# Patient Record
Sex: FEMALE | Race: WHITE | NOT HISPANIC OR LATINO | Employment: OTHER | URBAN - NONMETROPOLITAN AREA
[De-identification: names, ages, dates, MRNs, and addresses within clinical notes are randomized per-mention and may not be internally consistent; named-entity substitution may affect disease eponyms.]

---

## 2017-01-10 ENCOUNTER — TRANSCRIBE ORDERS (OUTPATIENT)
Dept: ADMINISTRATIVE | Facility: HOSPITAL | Age: 72
End: 2017-01-10

## 2017-01-10 ENCOUNTER — LAB (OUTPATIENT)
Dept: LAB | Facility: HOSPITAL | Age: 72
End: 2017-01-10
Attending: INTERNAL MEDICINE

## 2017-01-10 DIAGNOSIS — N39.0 URINARY TRACT INFECTION, SITE NOT SPECIFIED: ICD-10-CM

## 2017-01-10 DIAGNOSIS — N39.0 URINARY TRACT INFECTION, SITE NOT SPECIFIED: Primary | ICD-10-CM

## 2017-01-10 LAB

## 2017-01-10 PROCEDURE — 81001 URINALYSIS AUTO W/SCOPE: CPT | Performed by: INTERNAL MEDICINE

## 2017-03-30 ENCOUNTER — HOSPITAL ENCOUNTER (OUTPATIENT)
Dept: MRI IMAGING | Age: 72
Discharge: HOME | End: 2017-03-30
Payer: MEDICARE

## 2017-03-30 DIAGNOSIS — Z96.652 PRESENCE OF LEFT ARTIFICIAL KNEE JOINT: ICD-10-CM

## 2017-03-30 DIAGNOSIS — M25.562 LEFT KNEE PAIN, UNSPECIFIED CHRONICITY: ICD-10-CM

## 2017-03-30 DIAGNOSIS — S83.412A SPRAIN OF MEDIAL COLLATERAL LIGAMENT OF LEFT KNEE, INITIAL ENCOUNTER: ICD-10-CM

## 2017-03-30 PROCEDURE — 73721 MRI JNT OF LWR EXTRE W/O DYE: CPT

## 2017-06-14 ENCOUNTER — TRANSCRIBE ORDERS (OUTPATIENT)
Dept: ADMINISTRATIVE | Facility: HOSPITAL | Age: 72
End: 2017-06-14

## 2017-06-14 DIAGNOSIS — Z12.31 ENCOUNTER FOR SCREENING MAMMOGRAM FOR MALIGNANT NEOPLASM OF BREAST: Primary | ICD-10-CM

## 2017-06-28 ENCOUNTER — HOSPITAL ENCOUNTER (OUTPATIENT)
Dept: MAMMOGRAPHY | Facility: HOSPITAL | Age: 72
Discharge: HOME | End: 2017-06-28
Attending: INTERNAL MEDICINE | Admitting: INTERNAL MEDICINE

## 2017-06-28 DIAGNOSIS — Z12.31 ENCOUNTER FOR SCREENING MAMMOGRAM FOR MALIGNANT NEOPLASM OF BREAST: ICD-10-CM

## 2017-06-28 PROCEDURE — G0202 SCR MAMMO BI INCL CAD: HCPCS

## 2017-06-28 PROCEDURE — 77063 BREAST TOMOSYNTHESIS BI: CPT

## 2017-06-30 ENCOUNTER — APPOINTMENT (OUTPATIENT)
Dept: MAMMOGRAPHY | Facility: HOSPITAL | Age: 72
End: 2017-06-30
Attending: INTERNAL MEDICINE

## 2017-10-20 ENCOUNTER — HOSPITAL ENCOUNTER (OUTPATIENT)
Age: 72
Setting detail: OBSERVATION
Discharge: HOME | End: 2017-10-22
Attending: EMERGENCY MEDICINE | Admitting: INTERNAL MEDICINE
Payer: MEDICARE

## 2017-10-20 ENCOUNTER — APPOINTMENT (OUTPATIENT)
Dept: CT IMAGING | Age: 72
End: 2017-10-20
Payer: MEDICARE

## 2017-10-20 DIAGNOSIS — K57.32 DIVERTICULITIS OF COLON: Primary | ICD-10-CM

## 2017-10-20 PROBLEM — I10 HTN (HYPERTENSION): Chronic | Status: ACTIVE | Noted: 2017-10-20

## 2017-10-20 LAB
ALBUMIN SERPL-MCNC: 4.4 G/DL (ref 3.5–5.2)
ALP BLD-CCNC: 88 U/L (ref 35–104)
ALT SERPL-CCNC: 11 U/L (ref 5–33)
ANION GAP SERPL CALCULATED.3IONS-SCNC: 12 MMOL/L (ref 7–19)
AST SERPL-CCNC: 13 U/L (ref 5–32)
BACTERIA: NEGATIVE /HPF
BASOPHILS ABSOLUTE: 0.1 K/UL (ref 0–0.2)
BASOPHILS RELATIVE PERCENT: 0.5 % (ref 0–1)
BILIRUB SERPL-MCNC: 0.5 MG/DL (ref 0.2–1.2)
BILIRUBIN URINE: NEGATIVE
BLOOD, URINE: NEGATIVE
BUN BLDV-MCNC: 13 MG/DL (ref 8–23)
CALCIUM SERPL-MCNC: 9.4 MG/DL (ref 8.8–10.2)
CHLORIDE BLD-SCNC: 99 MMOL/L (ref 98–111)
CLARITY: ABNORMAL
CO2: 27 MMOL/L (ref 22–29)
COLOR: ABNORMAL
CREAT SERPL-MCNC: 0.8 MG/DL (ref 0.5–0.9)
EOSINOPHILS ABSOLUTE: 0.2 K/UL (ref 0–0.6)
EOSINOPHILS RELATIVE PERCENT: 1.6 % (ref 0–5)
EPITHELIAL CELLS, UA: 3 /HPF (ref 0–5)
GFR NON-AFRICAN AMERICAN: >60
GLUCOSE BLD-MCNC: 106 MG/DL (ref 74–109)
GLUCOSE URINE: NEGATIVE MG/DL
HCT VFR BLD CALC: 40.1 % (ref 37–47)
HEMOGLOBIN: 13.4 G/DL (ref 12–16)
HYALINE CASTS: 2 /HPF (ref 0–8)
KETONES, URINE: NEGATIVE MG/DL
LEUKOCYTE ESTERASE, URINE: ABNORMAL
LIPASE: 51 U/L (ref 13–60)
LYMPHOCYTES ABSOLUTE: 2.7 K/UL (ref 1.1–4.5)
LYMPHOCYTES RELATIVE PERCENT: 21.5 % (ref 20–40)
MCH RBC QN AUTO: 28.9 PG (ref 27–31)
MCHC RBC AUTO-ENTMCNC: 33.4 G/DL (ref 33–37)
MCV RBC AUTO: 86.4 FL (ref 81–99)
MONOCYTES ABSOLUTE: 1.2 K/UL (ref 0–0.9)
MONOCYTES RELATIVE PERCENT: 9.5 % (ref 0–10)
NEUTROPHILS ABSOLUTE: 8.5 K/UL (ref 1.5–7.5)
NEUTROPHILS RELATIVE PERCENT: 66.6 % (ref 50–65)
NITRITE, URINE: NEGATIVE
PDW BLD-RTO: 12.9 % (ref 11.5–14.5)
PH UA: 7.5
PLATELET # BLD: 330 K/UL (ref 130–400)
PMV BLD AUTO: 9.9 FL (ref 9.4–12.3)
POTASSIUM SERPL-SCNC: 3.8 MMOL/L (ref 3.5–5)
PROTEIN UA: NEGATIVE MG/DL
RBC # BLD: 4.64 M/UL (ref 4.2–5.4)
RBC UA: 1 /HPF (ref 0–4)
SODIUM BLD-SCNC: 138 MMOL/L (ref 136–145)
SPECIFIC GRAVITY UA: 1.01
TOTAL PROTEIN: 7.9 G/DL (ref 6.6–8.7)
UROBILINOGEN, URINE: 0.2 E.U./DL
WBC # BLD: 12.7 K/UL (ref 4.8–10.8)
WBC UA: 9 /HPF (ref 0–5)

## 2017-10-20 PROCEDURE — G0378 HOSPITAL OBSERVATION PER HR: HCPCS

## 2017-10-20 PROCEDURE — 96376 TX/PRO/DX INJ SAME DRUG ADON: CPT

## 2017-10-20 PROCEDURE — 99220 PR INITIAL OBSERVATION CARE/DAY 70 MINUTES: CPT | Performed by: HOSPITALIST

## 2017-10-20 PROCEDURE — 96365 THER/PROPH/DIAG IV INF INIT: CPT

## 2017-10-20 PROCEDURE — 83690 ASSAY OF LIPASE: CPT

## 2017-10-20 PROCEDURE — 2580000003 HC RX 258: Performed by: CLINICAL NURSE SPECIALIST

## 2017-10-20 PROCEDURE — 6370000000 HC RX 637 (ALT 250 FOR IP): Performed by: CLINICAL NURSE SPECIALIST

## 2017-10-20 PROCEDURE — 6360000002 HC RX W HCPCS: Performed by: CLINICAL NURSE SPECIALIST

## 2017-10-20 PROCEDURE — 99285 EMERGENCY DEPT VISIT HI MDM: CPT

## 2017-10-20 PROCEDURE — 80053 COMPREHEN METABOLIC PANEL: CPT

## 2017-10-20 PROCEDURE — 99284 EMERGENCY DEPT VISIT MOD MDM: CPT | Performed by: EMERGENCY MEDICINE

## 2017-10-20 PROCEDURE — 74177 CT ABD & PELVIS W/CONTRAST: CPT

## 2017-10-20 PROCEDURE — 2580000003 HC RX 258: Performed by: EMERGENCY MEDICINE

## 2017-10-20 PROCEDURE — C9113 INJ PANTOPRAZOLE SODIUM, VIA: HCPCS | Performed by: CLINICAL NURSE SPECIALIST

## 2017-10-20 PROCEDURE — 96366 THER/PROPH/DIAG IV INF ADDON: CPT

## 2017-10-20 PROCEDURE — 6360000002 HC RX W HCPCS: Performed by: HOSPITALIST

## 2017-10-20 PROCEDURE — 85025 COMPLETE CBC W/AUTO DIFF WBC: CPT

## 2017-10-20 PROCEDURE — 81001 URINALYSIS AUTO W/SCOPE: CPT

## 2017-10-20 PROCEDURE — 96375 TX/PRO/DX INJ NEW DRUG ADDON: CPT

## 2017-10-20 PROCEDURE — 36415 COLL VENOUS BLD VENIPUNCTURE: CPT

## 2017-10-20 PROCEDURE — 6360000002 HC RX W HCPCS: Performed by: EMERGENCY MEDICINE

## 2017-10-20 PROCEDURE — 87086 URINE CULTURE/COLONY COUNT: CPT

## 2017-10-20 PROCEDURE — 6360000004 HC RX CONTRAST MEDICATION: Performed by: EMERGENCY MEDICINE

## 2017-10-20 PROCEDURE — 2500000003 HC RX 250 WO HCPCS: Performed by: HOSPITALIST

## 2017-10-20 RX ORDER — SODIUM CHLORIDE 9 MG/ML
INJECTION, SOLUTION INTRAVENOUS CONTINUOUS
Status: DISCONTINUED | OUTPATIENT
Start: 2017-10-20 | End: 2017-10-22 | Stop reason: HOSPADM

## 2017-10-20 RX ORDER — VALSARTAN 160 MG/1
320 TABLET ORAL DAILY
Status: DISCONTINUED | OUTPATIENT
Start: 2017-10-20 | End: 2017-10-21

## 2017-10-20 RX ORDER — PANTOPRAZOLE SODIUM 40 MG/10ML
40 INJECTION, POWDER, LYOPHILIZED, FOR SOLUTION INTRAVENOUS DAILY
Status: DISCONTINUED | OUTPATIENT
Start: 2017-10-20 | End: 2017-10-21

## 2017-10-20 RX ORDER — PROMETHAZINE HYDROCHLORIDE 25 MG/ML
25 INJECTION, SOLUTION INTRAMUSCULAR; INTRAVENOUS EVERY 4 HOURS PRN
Status: DISCONTINUED | OUTPATIENT
Start: 2017-10-20 | End: 2017-10-22 | Stop reason: HOSPADM

## 2017-10-20 RX ORDER — ONDANSETRON 2 MG/ML
4 INJECTION INTRAMUSCULAR; INTRAVENOUS ONCE
Status: COMPLETED | OUTPATIENT
Start: 2017-10-20 | End: 2017-10-20

## 2017-10-20 RX ORDER — ASPIRIN 81 MG/1
81 TABLET ORAL DAILY
Status: DISCONTINUED | OUTPATIENT
Start: 2017-10-20 | End: 2017-10-22 | Stop reason: HOSPADM

## 2017-10-20 RX ORDER — SODIUM CHLORIDE 0.9 % (FLUSH) 0.9 %
10 SYRINGE (ML) INJECTION EVERY 12 HOURS SCHEDULED
Status: DISCONTINUED | OUTPATIENT
Start: 2017-10-20 | End: 2017-10-22 | Stop reason: HOSPADM

## 2017-10-20 RX ORDER — SODIUM CHLORIDE 0.9 % (FLUSH) 0.9 %
10 SYRINGE (ML) INJECTION PRN
Status: DISCONTINUED | OUTPATIENT
Start: 2017-10-20 | End: 2017-10-22 | Stop reason: HOSPADM

## 2017-10-20 RX ORDER — SIMVASTATIN 20 MG
20 TABLET ORAL NIGHTLY
Status: DISCONTINUED | OUTPATIENT
Start: 2017-10-20 | End: 2017-10-21

## 2017-10-20 RX ORDER — VALSARTAN 320 MG/1
320 TABLET ORAL DAILY
COMMUNITY
End: 2018-08-23

## 2017-10-20 RX ORDER — ACETAMINOPHEN 325 MG/1
650 TABLET ORAL EVERY 4 HOURS PRN
Status: DISCONTINUED | OUTPATIENT
Start: 2017-10-20 | End: 2017-10-22 | Stop reason: HOSPADM

## 2017-10-20 RX ORDER — ONDANSETRON 2 MG/ML
4 INJECTION INTRAMUSCULAR; INTRAVENOUS EVERY 6 HOURS PRN
Status: DISCONTINUED | OUTPATIENT
Start: 2017-10-20 | End: 2017-10-22 | Stop reason: HOSPADM

## 2017-10-20 RX ORDER — 0.9 % SODIUM CHLORIDE 0.9 %
1000 INTRAVENOUS SOLUTION INTRAVENOUS ONCE
Status: COMPLETED | OUTPATIENT
Start: 2017-10-20 | End: 2017-10-20

## 2017-10-20 RX ORDER — ASPIRIN 81 MG/1
81 TABLET ORAL DAILY
Status: ON HOLD | COMMUNITY
End: 2020-11-12

## 2017-10-20 RX ORDER — LEVOTHYROXINE SODIUM 0.03 MG/1
50 TABLET ORAL DAILY
Status: DISCONTINUED | OUTPATIENT
Start: 2017-10-20 | End: 2017-10-21

## 2017-10-20 RX ADMIN — SODIUM CHLORIDE: 9 INJECTION, SOLUTION INTRAVENOUS at 17:49

## 2017-10-20 RX ADMIN — HYDROMORPHONE HYDROCHLORIDE 1 MG: 1 INJECTION, SOLUTION INTRAMUSCULAR; INTRAVENOUS; SUBCUTANEOUS at 08:58

## 2017-10-20 RX ADMIN — ENOXAPARIN SODIUM 40 MG: 40 INJECTION SUBCUTANEOUS at 17:49

## 2017-10-20 RX ADMIN — TAZOBACTAM SODIUM AND PIPERACILLIN SODIUM 3.38 G: 375; 3 INJECTION, SOLUTION INTRAVENOUS at 11:31

## 2017-10-20 RX ADMIN — IOPAMIDOL 90 ML: 755 INJECTION, SOLUTION INTRAVENOUS at 09:46

## 2017-10-20 RX ADMIN — PANTOPRAZOLE SODIUM 40 MG: 40 INJECTION, POWDER, FOR SOLUTION INTRAVENOUS at 17:49

## 2017-10-20 RX ADMIN — HYDROMORPHONE HYDROCHLORIDE 1 MG: 1 INJECTION, SOLUTION INTRAMUSCULAR; INTRAVENOUS; SUBCUTANEOUS at 22:22

## 2017-10-20 RX ADMIN — SIMVASTATIN 20 MG: 20 TABLET, FILM COATED ORAL at 22:22

## 2017-10-20 RX ADMIN — SODIUM CHLORIDE 1000 ML: 9 INJECTION, SOLUTION INTRAVENOUS at 08:58

## 2017-10-20 RX ADMIN — TAZOBACTAM SODIUM AND PIPERACILLIN SODIUM 3.38 G: 375; 3 INJECTION, SOLUTION INTRAVENOUS at 22:22

## 2017-10-20 RX ADMIN — ONDANSETRON 4 MG: 2 INJECTION INTRAMUSCULAR; INTRAVENOUS at 08:58

## 2017-10-20 RX ADMIN — ASPIRIN 81 MG: 81 TABLET, COATED ORAL at 17:49

## 2017-10-20 ASSESSMENT — ENCOUNTER SYMPTOMS
ABDOMINAL PAIN: 1
RHINORRHEA: 0
EYES NEGATIVE: 1
BACK PAIN: 0
RESPIRATORY NEGATIVE: 1
DIARRHEA: 1
NAUSEA: 1
SORE THROAT: 0
SHORTNESS OF BREATH: 0
VOMITING: 0

## 2017-10-20 ASSESSMENT — PAIN SCALES - GENERAL
PAINLEVEL_OUTOF10: 8
PAINLEVEL_OUTOF10: 10
PAINLEVEL_OUTOF10: 8
PAINLEVEL_OUTOF10: 10

## 2017-10-20 ASSESSMENT — PAIN DESCRIPTION - LOCATION: LOCATION: ABDOMEN

## 2017-10-20 NOTE — H&P
at [facility] OR       Allergies:  Ciprofloxacin and Morphine    Medications Prior to Admission:    Prescriptions Prior to Admission: valsartan (DIOVAN) 320 MG tablet, Take 320 mg by mouth daily  aspirin 81 MG EC tablet, Take 81 mg by mouth daily  NONFORMULARY,   Levothyroxine Sodium (SYNTHROID PO), Take 50 mcg by mouth daily   SIMVASTATIN PO, Take 20 mg by mouth nightly   valsartan-hydrochlorothiazide (DIOVAN-HCT) 320-25 MG per tablet, Take 0.5 tablets by mouth daily       Social History:    reports that she has never smoked. She has never used smokeless tobacco. She reports that she does not drink alcohol or use drugs. Family History:       Problem Relation Age of Onset    Prostate Cancer Father     Breast Cancer Sister     Lung Cancer Brother     Colon Cancer Paternal Aunt     Colon Polyps Paternal Aunt        REVIEW OF SYSTEMS:    Review of Systems   Constitutional: Positive for fever. Eyes: Negative. Respiratory: Negative. Cardiovascular: Negative. Gastrointestinal: Positive for abdominal pain and nausea. Genitourinary: Negative. Musculoskeletal: Negative. Skin: Negative for itching and rash. Neurological: Positive for dizziness, weakness and headaches. Endo/Heme/Allergies: Negative. Psychiatric/Behavioral: Negative. PHYSICAL EXAM:  Vital Signs: /72   Pulse 80   Temp 97.6 °F (36.4 °C) (Temporal)   Resp 16   Ht 5' 4\" (1.626 m)   Wt 184 lb (83.5 kg)   SpO2 96%   BMI 31.58 kg/m²     Physical Exam   Constitutional: She is oriented to person, place, and time. Vital signs are normal. She appears well-developed and well-nourished. She is cooperative. No distress. HENT:   Head: Normocephalic and atraumatic. Right Ear: External ear normal.   Left Ear: External ear normal.   Nose: Nose normal.   Mouth/Throat: Oropharynx is clear and moist.   Eyes: Conjunctivae and EOM are normal. Pupils are equal, round, and reactive to light. Neck: Normal range of motion.  Neck documented above. Chivo Barajas MD      CC: Abdominal pain  S: Mild pain 2 days PTA, much worse last night, general and LLQ, nausea, no vomiting, BMs normal no bleeding, reminiscent of 2 prior episodes of diverticulitis, seem to happy every 14 months. Last time treated with Cipro. She's worried that the \"observation\" status will be too expensive - have asked CM to review. O:Vitals: /76   Pulse 80   Temp 97.6 °F (36.4 °C) (Temporal)   Resp 16   Ht 5' 4\" (1.626 m)   Wt 184 lb (83.5 kg)   SpO2 96%   BMI 31.58 kg/m²   24HR INTAKE/OUTPUT:  No intake or output data in the 24 hours ending 10/20/17 1537  General appearance: alert and cooperative with exam, very pleasant lady. HEENT: atraumatic, eyes with clear conjunctiva and normal lids, pupils and irises normal, external ears and nose are normal, lips normal. Neck without masses, lympadenopathy, bruit, thyroid normal  Lungs: no increased work of breathing, clear to auscultation bilaterally without rales, rhonchi or wheezes  Heart: regular rate and rhythm, S1, S2 normal, no murmur, click, rub or gallop  Abdomen: Soft, L > R LLQ tenderness, no guarding, rebound, HSM, NABS  Extremities: extremities normal, atraumatic, no cyanosis or edema  Neurologic: No focal neurologic deficits, normal sensation, alert and oriented, affect and mood appropriate. Skin: no rashes, nodules. A/P: Clinically and radiographically she has diverticulitis, will treat with Zosyn due to her cipro allergy, consider repeat renal US as OP as her cyst was enlarged on CT, she reports having this done recently at [facility] and can be deferred to her PCP. Clear liquid diet, she tolerated dilaudid historically.

## 2017-10-20 NOTE — ED PROVIDER NOTES
completed with a voice recognition program.  Efforts were made to edit the dictations but occasionally words are mis-transcribed.)    Romy Mcadams MD (electronically signed)  Attending Emergency Physician         Romy Mcadams MD  10/20/17 3126

## 2017-10-21 PROBLEM — G47.00 INSOMNIA: Status: ACTIVE | Noted: 2017-10-21

## 2017-10-21 LAB
ANION GAP SERPL CALCULATED.3IONS-SCNC: 11 MMOL/L (ref 7–19)
BUN BLDV-MCNC: 7 MG/DL (ref 8–23)
CALCIUM SERPL-MCNC: 8.8 MG/DL (ref 8.8–10.2)
CHLORIDE BLD-SCNC: 102 MMOL/L (ref 98–111)
CO2: 30 MMOL/L (ref 22–29)
CREAT SERPL-MCNC: 0.8 MG/DL (ref 0.5–0.9)
GFR NON-AFRICAN AMERICAN: >60
GLUCOSE BLD-MCNC: 93 MG/DL (ref 74–109)
HCT VFR BLD CALC: 39.2 % (ref 37–47)
HEMOGLOBIN: 12.4 G/DL (ref 12–16)
MAGNESIUM: 2.1 MG/DL (ref 1.6–2.4)
MCH RBC QN AUTO: 28.6 PG (ref 27–31)
MCHC RBC AUTO-ENTMCNC: 31.6 G/DL (ref 33–37)
MCV RBC AUTO: 90.3 FL (ref 81–99)
PDW BLD-RTO: 12.7 % (ref 11.5–14.5)
PHOSPHORUS: 3.4 MG/DL (ref 2.5–4.5)
PLATELET # BLD: 279 K/UL (ref 130–400)
PMV BLD AUTO: 10 FL (ref 9.4–12.3)
POTASSIUM SERPL-SCNC: 4 MMOL/L (ref 3.5–5)
RBC # BLD: 4.34 M/UL (ref 4.2–5.4)
SODIUM BLD-SCNC: 143 MMOL/L (ref 136–145)
TSH SERPL DL<=0.05 MIU/L-ACNC: 15.46 UIU/ML (ref 0.27–4.2)
WBC # BLD: 5.8 K/UL (ref 4.8–10.8)

## 2017-10-21 PROCEDURE — C9113 INJ PANTOPRAZOLE SODIUM, VIA: HCPCS | Performed by: CLINICAL NURSE SPECIALIST

## 2017-10-21 PROCEDURE — 6370000000 HC RX 637 (ALT 250 FOR IP): Performed by: HOSPITALIST

## 2017-10-21 PROCEDURE — 80048 BASIC METABOLIC PNL TOTAL CA: CPT

## 2017-10-21 PROCEDURE — 84100 ASSAY OF PHOSPHORUS: CPT

## 2017-10-21 PROCEDURE — 85027 COMPLETE CBC AUTOMATED: CPT

## 2017-10-21 PROCEDURE — G0378 HOSPITAL OBSERVATION PER HR: HCPCS

## 2017-10-21 PROCEDURE — 6370000000 HC RX 637 (ALT 250 FOR IP): Performed by: CLINICAL NURSE SPECIALIST

## 2017-10-21 PROCEDURE — 84443 ASSAY THYROID STIM HORMONE: CPT

## 2017-10-21 PROCEDURE — 99226 PR SBSQ OBSERVATION CARE/DAY 35 MINUTES: CPT | Performed by: HOSPITALIST

## 2017-10-21 PROCEDURE — 2500000003 HC RX 250 WO HCPCS: Performed by: HOSPITALIST

## 2017-10-21 PROCEDURE — 96376 TX/PRO/DX INJ SAME DRUG ADON: CPT

## 2017-10-21 PROCEDURE — 36415 COLL VENOUS BLD VENIPUNCTURE: CPT

## 2017-10-21 PROCEDURE — 96372 THER/PROPH/DIAG INJ SC/IM: CPT

## 2017-10-21 PROCEDURE — 83735 ASSAY OF MAGNESIUM: CPT

## 2017-10-21 PROCEDURE — 6360000002 HC RX W HCPCS: Performed by: CLINICAL NURSE SPECIALIST

## 2017-10-21 PROCEDURE — 96366 THER/PROPH/DIAG IV INF ADDON: CPT

## 2017-10-21 RX ORDER — AMOXICILLIN AND CLAVULANATE POTASSIUM 500; 125 MG/1; MG/1
1 TABLET, FILM COATED ORAL EVERY 8 HOURS SCHEDULED
Status: DISCONTINUED | OUTPATIENT
Start: 2017-10-22 | End: 2017-10-22 | Stop reason: HOSPADM

## 2017-10-21 RX ORDER — ZOLPIDEM TARTRATE 5 MG/1
5 TABLET ORAL NIGHTLY PRN
Status: DISCONTINUED | OUTPATIENT
Start: 2017-10-21 | End: 2017-10-22 | Stop reason: HOSPADM

## 2017-10-21 RX ORDER — PANTOPRAZOLE SODIUM 40 MG/1
40 TABLET, DELAYED RELEASE ORAL
Status: DISCONTINUED | OUTPATIENT
Start: 2017-10-22 | End: 2017-10-22 | Stop reason: HOSPADM

## 2017-10-21 RX ORDER — SIMVASTATIN 20 MG
20 TABLET ORAL NIGHTLY
Status: DISCONTINUED | OUTPATIENT
Start: 2017-10-21 | End: 2017-10-22 | Stop reason: HOSPADM

## 2017-10-21 RX ORDER — LEVOTHYROXINE SODIUM 0.05 MG/1
50 TABLET ORAL DAILY
Status: DISCONTINUED | OUTPATIENT
Start: 2017-10-22 | End: 2017-10-21

## 2017-10-21 RX ORDER — VALSARTAN 320 MG/1
320 TABLET ORAL DAILY
Status: DISCONTINUED | OUTPATIENT
Start: 2017-10-22 | End: 2017-10-22 | Stop reason: HOSPADM

## 2017-10-21 RX ADMIN — ZOLPIDEM TARTRATE 5 MG: 5 TABLET ORAL at 21:26

## 2017-10-21 RX ADMIN — Medication 20 MG: at 21:23

## 2017-10-21 RX ADMIN — TAZOBACTAM SODIUM AND PIPERACILLIN SODIUM 3.38 G: 375; 3 INJECTION, SOLUTION INTRAVENOUS at 22:49

## 2017-10-21 RX ADMIN — ENOXAPARIN SODIUM 40 MG: 40 INJECTION SUBCUTANEOUS at 16:51

## 2017-10-21 RX ADMIN — ACETAMINOPHEN 650 MG: 325 TABLET, FILM COATED ORAL at 02:36

## 2017-10-21 RX ADMIN — LEVOTHYROXINE SODIUM 50 MCG: 25 TABLET ORAL at 07:22

## 2017-10-21 RX ADMIN — VALSARTAN 320 MG: 160 TABLET ORAL at 07:22

## 2017-10-21 RX ADMIN — PANTOPRAZOLE SODIUM 40 MG: 40 INJECTION, POWDER, FOR SOLUTION INTRAVENOUS at 07:22

## 2017-10-21 RX ADMIN — TAZOBACTAM SODIUM AND PIPERACILLIN SODIUM 3.38 G: 375; 3 INJECTION, SOLUTION INTRAVENOUS at 14:37

## 2017-10-21 RX ADMIN — ASPIRIN 81 MG: 81 TABLET, COATED ORAL at 07:22

## 2017-10-21 RX ADMIN — ACETAMINOPHEN 650 MG: 325 TABLET, FILM COATED ORAL at 07:21

## 2017-10-21 RX ADMIN — TAZOBACTAM SODIUM AND PIPERACILLIN SODIUM 3.38 G: 375; 3 INJECTION, SOLUTION INTRAVENOUS at 05:09

## 2017-10-21 ASSESSMENT — PAIN SCALES - GENERAL
PAINLEVEL_OUTOF10: 6
PAINLEVEL_OUTOF10: 9
PAINLEVEL_OUTOF10: 9

## 2017-10-22 VITALS
HEART RATE: 55 BPM | WEIGHT: 184 LBS | SYSTOLIC BLOOD PRESSURE: 157 MMHG | TEMPERATURE: 97.4 F | RESPIRATION RATE: 16 BRPM | OXYGEN SATURATION: 94 % | HEIGHT: 64 IN | BODY MASS INDEX: 31.41 KG/M2 | DIASTOLIC BLOOD PRESSURE: 82 MMHG

## 2017-10-22 LAB — URINE CULTURE, ROUTINE: NORMAL

## 2017-10-22 PROCEDURE — 2500000003 HC RX 250 WO HCPCS: Performed by: HOSPITALIST

## 2017-10-22 PROCEDURE — 6370000000 HC RX 637 (ALT 250 FOR IP): Performed by: HOSPITALIST

## 2017-10-22 PROCEDURE — 99217 PR OBSERVATION CARE DISCHARGE MANAGEMENT: CPT | Performed by: HOSPITALIST

## 2017-10-22 PROCEDURE — G0378 HOSPITAL OBSERVATION PER HR: HCPCS

## 2017-10-22 PROCEDURE — 6370000000 HC RX 637 (ALT 250 FOR IP): Performed by: CLINICAL NURSE SPECIALIST

## 2017-10-22 RX ORDER — PROMETHAZINE HYDROCHLORIDE 25 MG/1
25 TABLET ORAL EVERY 4 HOURS PRN
Qty: 20 TABLET | Refills: 0 | Status: SHIPPED | OUTPATIENT
Start: 2017-10-22 | End: 2018-08-23

## 2017-10-22 RX ORDER — LEVOTHYROXINE SODIUM 0.05 MG/1
TABLET ORAL
Qty: 30 TABLET | Refills: 0 | Status: ON HOLD | DISCHARGE
Start: 2017-10-22 | End: 2022-10-19 | Stop reason: HOSPADM

## 2017-10-22 RX ORDER — AMOXICILLIN AND CLAVULANATE POTASSIUM 500; 125 MG/1; MG/1
1 TABLET, FILM COATED ORAL EVERY 8 HOURS SCHEDULED
Qty: 30 TABLET | Refills: 0 | Status: SHIPPED | OUTPATIENT
Start: 2017-10-22 | End: 2017-11-01

## 2017-10-22 RX ADMIN — PANTOPRAZOLE SODIUM 40 MG: 40 TABLET, DELAYED RELEASE ORAL at 06:21

## 2017-10-22 RX ADMIN — ASPIRIN 81 MG: 81 TABLET, COATED ORAL at 08:45

## 2017-10-22 RX ADMIN — AMOXICILLIN AND CLAVULANATE POTASSIUM 1 TABLET: 500; 125 TABLET, FILM COATED ORAL at 10:46

## 2017-10-22 RX ADMIN — VALSARTAN 320 MG: 320 TABLET ORAL at 08:46

## 2017-10-22 RX ADMIN — LEVOTHYROXINE SODIUM 100 MCG: 75 TABLET ORAL at 06:29

## 2017-10-22 RX ADMIN — TAZOBACTAM SODIUM AND PIPERACILLIN SODIUM 3.38 G: 375; 3 INJECTION, SOLUTION INTRAVENOUS at 08:45

## 2017-10-22 NOTE — DISCHARGE INSTR - DIET

## 2017-10-22 NOTE — DISCHARGE SUMMARY
tablet  Take 1 tablet by mouth every 4 hours as needed for Nausea             SIMVASTATIN PO  Take 20 mg by mouth nightly              valsartan (DIOVAN) 320 MG tablet  Take 320 mg by mouth daily                 Discharge Instructions: Follow up with Silvano Perez MD in 2-4 days. Take medications as directed. Resume activity as tolerated. Diet: DIET LOW FIBER;     Disposition: Patient is medically stable and will be discharged home. Time spent on discharge > 35 minutes.     Signed:  Deepak Hyde MD

## 2017-10-22 NOTE — PROGRESS NOTES
Hospitalist Progress Note  10/22/2017 7:42 AM  Subjective:   Admit Date: 10/20/2017  PCP: Betsy Garzon MD    Chief Complaint: Much Better    Subjective: Abdominal pain much better, she is very pleased and appreciative. Takes Synthroid regularly at home. Having trouble sleeping here, would like PRN sleeping pill. Interval History:    Mild pain 2 days PTA, much worse last night, general and LLQ, nausea, no vomiting, BMs normal no bleeding, reminiscent of 2 prior episodes of diverticulitis, seem to happy every 14 months. Last time treated with Cipro. She's worried that the \"observation\" status will be too expensive - have asked CM to review. ROS: 14 point review of systems is negative except as specifically addressed above. DIET LOW FIBER;     Intake/Output Summary (Last 24 hours) at 10/22/17 0742  Last data filed at 10/22/17 0630   Gross per 24 hour   Intake           3967.4 ml   Output             1000 ml   Net           2967.4 ml     Medications:   sodium chloride 125 mL/hr at 10/20/17 1749     Current Facility-Administered Medications   Medication Dose Route Frequency Provider Last Rate Last Dose    simvastatin (ZOCOR) tablet 20 mg  20 mg Oral Nightly Laural Knife, APRN   20 mg at 10/21/17 2123    valsartan (DIOVAN) tablet 320 mg  320 mg Oral Daily Laural Knife, APRN        pantoprazole (PROTONIX) tablet 40 mg  40 mg Oral QAM AC Kaela Flores MD   40 mg at 10/22/17 0910    amoxicillin-clavulanate (AUGMENTIN) 500-125 MG per tablet 1 tablet  1 tablet Oral 3 times per day Kaela Flores MD        levothyroxine (SYNTHROID) tablet 100 mcg  100 mcg Oral Daily Kaela Flores MD   100 mcg at 10/22/17 7790    zolpidem (AMBIEN) tablet 5 mg  5 mg Oral Nightly PRN Kaela Flores MD   5 mg at 10/21/17 2126    aspirin EC tablet 81 mg  81 mg Oral Daily Laural Knife, APRN   81 mg at 10/21/17 3439    sodium chloride flush 0.9 % injection 10 mL  10 mL Intravenous 2 times per day Christian Molina
times per day Ross Santosh, APRN        sodium chloride flush 0.9 % injection 10 mL  10 mL Intravenous PRN Ross Santosh, APRN        acetaminophen (TYLENOL) tablet 650 mg  650 mg Oral Q4H PRN Ross Santosh, APRN   650 mg at 10/21/17 1095    ondansetron (ZOFRAN) injection 4 mg  4 mg Intravenous Q6H PRN Ross Santosh, APRN        0.9 % sodium chloride infusion   Intravenous Continuous Ross Santosh, APRN 125 mL/hr at 10/20/17 1749      enoxaparin (LOVENOX) injection 40 mg  40 mg Subcutaneous Q24H Ross Santosh, APRN   40 mg at 10/20/17 1749    HYDROmorphone (DILAUDID) injection 1 mg  1 mg Intravenous Q4H PRN Kendrick Early MD   1 mg at 10/20/17 2222    promethazine (PHENERGAN) injection 25 mg  25 mg Intravenous Q4H PRN Kendrick Early MD        piperacillin-tazobactam (ZOSYN) 3.375 g in dextrose 50 mL IVPB extended infusion (premix)  3.375 g Intravenous Connor Altamirano MD   Stopped at 10/21/17 1048        Labs:     Recent Labs      10/20/17   0841  10/21/17   0240   WBC  12.7*  5.8   RBC  4.64  4.34   HGB  13.4  12.4   HCT  40.1  39.2   MCV  86.4  90.3   MCH  28.9  28.6   MCHC  33.4  31.6*   PLT  330  279     Recent Labs      10/20/17   0841  10/21/17   0240   NA  138  143   K  3.8  4.0   ANIONGAP  12  11   CL  99  102   CO2  27  30*   BUN  13  7*   CREATININE  0.8  0.8   GLUCOSE  106  93   CALCIUM  9.4  8.8     Recent Labs      10/21/17   0240   MG  2.1   PHOS  3.4     Recent Labs      10/20/17   0841   AST  13   ALT  11   BILITOT  0.5   ALKPHOS  88     TSH:    Lab Results   Component Value Date    TSH 15.460 10/21/2017       Objective:   Vitals: /77   Pulse 51   Temp 97.3 °F (36.3 °C) (Temporal)   Resp 16   Ht 5' 4\" (1.626 m)   Wt 184 lb (83.5 kg)   SpO2 98%   BMI 31.58 kg/m²   24HR INTAKE/OUTPUT:      Intake/Output Summary (Last 24 hours) at 10/21/17 1426  Last data filed at 10/21/17 0339   Gross per 24 hour   Intake              700 ml   Output                0 ml   Net

## 2017-10-30 ENCOUNTER — TRANSCRIBE ORDERS (OUTPATIENT)
Dept: ADMINISTRATIVE | Facility: HOSPITAL | Age: 72
End: 2017-10-30

## 2017-10-30 DIAGNOSIS — K57.32 DIVERTICULITIS, COLON: ICD-10-CM

## 2017-10-30 DIAGNOSIS — K57.33 DIVERTICULITIS LARGE INTESTINE W/O PERFORATION OR ABSCESS W/BLEEDING: Primary | ICD-10-CM

## 2017-11-03 ENCOUNTER — HOSPITAL ENCOUNTER (OUTPATIENT)
Dept: CT IMAGING | Facility: HOSPITAL | Age: 72
Discharge: HOME | End: 2017-11-03
Attending: SPECIALIST | Admitting: SPECIALIST

## 2017-11-03 DIAGNOSIS — K57.32 DIVERTICULITIS, COLON: ICD-10-CM

## 2017-11-03 LAB — CREAT BLDA-MCNC: 0.9 MG/DL (ref 0.6–1.3)

## 2017-11-03 PROCEDURE — 0 IOHEXOL 300 MG/ML SOLUTION: Performed by: SPECIALIST

## 2017-11-03 PROCEDURE — 82565 ASSAY OF CREATININE: CPT

## 2017-11-03 PROCEDURE — 74177 CT ABD & PELVIS W/CONTRAST: CPT

## 2017-11-03 PROCEDURE — 0 IOPAMIDOL 61 % SOLUTION: Performed by: SPECIALIST

## 2017-11-03 RX ADMIN — IOHEXOL 50 ML: 300 INJECTION, SOLUTION INTRAVENOUS at 09:15

## 2017-11-03 RX ADMIN — IOPAMIDOL 100 ML: 612 INJECTION, SOLUTION INTRAVENOUS at 09:15

## 2018-01-02 ENCOUNTER — HOSPITAL ENCOUNTER (EMERGENCY)
Facility: HOSPITAL | Age: 73
Discharge: HOME | End: 2018-01-02
Attending: EMERGENCY MEDICINE | Admitting: EMERGENCY MEDICINE

## 2018-01-02 VITALS
HEIGHT: 64 IN | SYSTOLIC BLOOD PRESSURE: 180 MMHG | TEMPERATURE: 98.3 F | OXYGEN SATURATION: 95 % | RESPIRATION RATE: 20 BRPM | DIASTOLIC BLOOD PRESSURE: 90 MMHG | HEART RATE: 65 BPM | BODY MASS INDEX: 31.76 KG/M2 | WEIGHT: 186 LBS

## 2018-01-02 DIAGNOSIS — I10 ESSENTIAL HYPERTENSION: Primary | ICD-10-CM

## 2018-01-02 LAB
ALBUMIN SERPL-MCNC: 4.5 G/DL (ref 3.5–5)
ALBUMIN/GLOB SERPL: 1.5 G/DL (ref 1.1–2.5)
ALP SERPL-CCNC: 64 U/L (ref 24–120)
ALT SERPL W P-5'-P-CCNC: 34 U/L (ref 0–54)
ANION GAP SERPL CALCULATED.3IONS-SCNC: 12 MMOL/L (ref 4–13)
AST SERPL-CCNC: 30 U/L (ref 7–45)
BASOPHILS # BLD AUTO: 0.05 10*3/MM3 (ref 0–0.2)
BASOPHILS NFR BLD AUTO: 0.7 % (ref 0–2)
BILIRUB SERPL-MCNC: 0.5 MG/DL (ref 0.1–1)
BUN BLD-MCNC: 15 MG/DL (ref 5–21)
BUN/CREAT SERPL: 18.1 (ref 7–25)
CALCIUM SPEC-SCNC: 9.6 MG/DL (ref 8.4–10.4)
CHLORIDE SERPL-SCNC: 104 MMOL/L (ref 98–110)
CO2 SERPL-SCNC: 27 MMOL/L (ref 24–31)
CREAT BLD-MCNC: 0.83 MG/DL (ref 0.5–1.4)
DEPRECATED RDW RBC AUTO: 43.5 FL (ref 40–54)
EOSINOPHIL # BLD AUTO: 0.7 10*3/MM3 (ref 0–0.7)
EOSINOPHIL NFR BLD AUTO: 10.3 % (ref 0–4)
ERYTHROCYTE [DISTWIDTH] IN BLOOD BY AUTOMATED COUNT: 13.8 % (ref 12–15)
GFR SERPL CREATININE-BSD FRML MDRD: 67 ML/MIN/1.73
GLOBULIN UR ELPH-MCNC: 3 GM/DL
GLUCOSE BLD-MCNC: 132 MG/DL (ref 70–100)
HCT VFR BLD AUTO: 40.7 % (ref 37–47)
HGB BLD-MCNC: 13.3 G/DL (ref 12–16)
IMM GRANULOCYTES # BLD: 0.01 10*3/MM3 (ref 0–0.03)
IMM GRANULOCYTES NFR BLD: 0.1 % (ref 0–5)
LYMPHOCYTES # BLD AUTO: 1.9 10*3/MM3 (ref 0.72–4.86)
LYMPHOCYTES NFR BLD AUTO: 28.1 % (ref 15–45)
MCH RBC QN AUTO: 28.1 PG (ref 28–32)
MCHC RBC AUTO-ENTMCNC: 32.7 G/DL (ref 33–36)
MCV RBC AUTO: 86 FL (ref 82–98)
MONOCYTES # BLD AUTO: 0.57 10*3/MM3 (ref 0.19–1.3)
MONOCYTES NFR BLD AUTO: 8.4 % (ref 4–12)
NEUTROPHILS # BLD AUTO: 3.54 10*3/MM3 (ref 1.87–8.4)
NEUTROPHILS NFR BLD AUTO: 52.4 % (ref 39–78)
PLATELET # BLD AUTO: 302 10*3/MM3 (ref 130–400)
PMV BLD AUTO: 10.5 FL (ref 6–12)
POTASSIUM BLD-SCNC: 3.9 MMOL/L (ref 3.5–5.3)
PROT SERPL-MCNC: 7.5 G/DL (ref 6.3–8.7)
RBC # BLD AUTO: 4.73 10*6/MM3 (ref 4.2–5.4)
SODIUM BLD-SCNC: 143 MMOL/L (ref 135–145)
TROPONIN I SERPL-MCNC: <0.012 NG/ML (ref 0–0.03)
TROPONIN I SERPL-MCNC: <0.012 NG/ML (ref 0–0.03)
WBC NRBC COR # BLD: 6.77 10*3/MM3 (ref 4.8–10.8)

## 2018-01-02 PROCEDURE — 80053 COMPREHEN METABOLIC PANEL: CPT | Performed by: EMERGENCY MEDICINE

## 2018-01-02 PROCEDURE — 93005 ELECTROCARDIOGRAM TRACING: CPT | Performed by: EMERGENCY MEDICINE

## 2018-01-02 PROCEDURE — 84484 ASSAY OF TROPONIN QUANT: CPT | Performed by: EMERGENCY MEDICINE

## 2018-01-02 PROCEDURE — 36415 COLL VENOUS BLD VENIPUNCTURE: CPT | Performed by: EMERGENCY MEDICINE

## 2018-01-02 PROCEDURE — 85025 COMPLETE CBC W/AUTO DIFF WBC: CPT | Performed by: EMERGENCY MEDICINE

## 2018-01-02 PROCEDURE — 99284 EMERGENCY DEPT VISIT MOD MDM: CPT

## 2018-01-02 PROCEDURE — 93010 ELECTROCARDIOGRAM REPORT: CPT | Performed by: INTERNAL MEDICINE

## 2018-01-02 RX ORDER — NIFEDIPINE 10 MG/1
10 CAPSULE ORAL ONCE
Status: COMPLETED | OUTPATIENT
Start: 2018-01-02 | End: 2018-01-02

## 2018-01-02 RX ORDER — CLONAZEPAM 0.5 MG/1
0.5 TABLET ORAL 2 TIMES DAILY PRN
Qty: 10 TABLET | Refills: 0 | Status: SHIPPED | OUTPATIENT
Start: 2018-01-02 | End: 2018-07-18

## 2018-01-02 RX ORDER — ACETAMINOPHEN 500 MG
1000 TABLET ORAL ONCE
Status: DISCONTINUED | OUTPATIENT
Start: 2018-01-02 | End: 2018-01-02 | Stop reason: HOSPADM

## 2018-01-02 RX ORDER — AMLODIPINE BESYLATE 2.5 MG/1
2.5 TABLET ORAL DAILY
Qty: 20 TABLET | Refills: 0 | Status: SHIPPED | OUTPATIENT
Start: 2018-01-02 | End: 2018-07-18

## 2018-01-02 RX ADMIN — ACETAMINOPHEN 1000 MG: 500 TABLET ORAL at 14:00

## 2018-01-02 RX ADMIN — NIFEDIPINE 10 MG: 10 CAPSULE, LIQUID FILLED ORAL at 10:08

## 2018-01-02 NOTE — ED PROVIDER NOTES
Subjective   Patient is a 72 y.o. female presenting with hypertension.   Hypertension   Severity:  Moderate  Onset quality:  Gradual  Timing:  Constant  Chronicity:  New  Context: normal sodium, not caffeine, not drug abuse, not herbal remedies, not medication change, not noncompliance, not OTC medications used and not stress    Relieved by:  ACE inhibitors  Worsened by:  Nothing  Ineffective treatments:  None tried  Associated symptoms: no abdominal pain, no anxiety, no blurred vision, no chest pain, no confusion, no dizziness, no fever, no headaches, no hematuria, no hypokalemia, no loss of consciousness, no nausea, no neck pain, no peripheral edema, no shortness of breath, no syncope, no tinnitus and no weakness    Associated symptoms comment:  Rt shoulder pain and under a lot of stress  Risk factors: family hx of HTN    Risk factors: no cardiac disease, no cocaine use, no kidney disease, no prior stroke, no PVD and no tobacco use        Review of Systems   Constitutional: Negative.  Negative for fever.   HENT: Negative.  Negative for tinnitus.    Eyes: Negative.  Negative for blurred vision.   Respiratory: Negative.  Negative for shortness of breath.    Cardiovascular: Negative.  Negative for chest pain and syncope.   Gastrointestinal: Negative.  Negative for abdominal pain and nausea.   Genitourinary: Negative for hematuria.   Musculoskeletal: Negative.  Negative for back pain and neck pain.   Skin: Negative.    Neurological: Negative.  Negative for dizziness, loss of consciousness, weakness and headaches.   Psychiatric/Behavioral: Negative for confusion. The patient is not nervous/anxious.    All other systems reviewed and are negative.      Past Medical History:   Diagnosis Date   • Breast cancer 1984    left breast cancer       Allergies   Allergen Reactions   • Morphine And Related        Past Surgical History:   Procedure Laterality Date   • MASTECTOMY Bilateral 1984       Family History   Problem Relation  Age of Onset   • Breast cancer Sister        Social History     Social History   • Marital status:      Spouse name: N/A   • Number of children: N/A   • Years of education: N/A     Social History Main Topics   • Smoking status: Not on file   • Smokeless tobacco: Not on file   • Alcohol use Not on file   • Drug use: Not on file   • Sexual activity: Not on file     Other Topics Concern   • Not on file     Social History Narrative   • No narrative on file           Objective   Physical Exam   Constitutional: She is oriented to person, place, and time. She appears well-developed and well-nourished.   HENT:   Head: Normocephalic and atraumatic.   Eyes: Conjunctivae and EOM are normal. Pupils are equal, round, and reactive to light.   Neck: Normal range of motion. Neck supple.   Cardiovascular: Normal rate, regular rhythm, normal heart sounds and intact distal pulses.    Pulmonary/Chest: Effort normal and breath sounds normal.   Abdominal: Soft. Bowel sounds are normal.   Musculoskeletal: Normal range of motion.   Neurological: She is alert and oriented to person, place, and time. She has normal reflexes.   Skin: Skin is warm and dry.   Psychiatric: She has a normal mood and affect.   Nursing note and vitals reviewed.      Procedures         ED Course  ED Course   Comment By Time   Patient with the hypertension multiple discussion made with the patient initially she is was not wanting further workup and wanted gone home now she is wants stress test Neeraj Dailey MD 01/02 1250   Case discussed at length with the patient Neeraj Dailey MD 01/02 1517   I was approached by the patient's  does not want to go home so now they decided against a stress test will discharge him home on antihypertensives and the antianxiety medication that the requesting Neeraj Dailey MD 01/02 1518   She is under a lot of anxiety and wants something for it Neeraj Dailey MD 01/02 1522                  [facility]    Final diagnoses:   Essential  hypertension            Neeraj Dailey MD  01/02/18 1554

## 2018-01-09 NOTE — ED NOTES
"ED Call Back Questions    1. How are you doing since leaving the Emergency Department?    Doing alright, my pressure still isn't where I want it  2. Do you have any questions about your discharge instructions? No     3. Have you filled your new prescriptions yet? N/A  a. Do you have any questions about those medications? N/A    4. Were you able to make a follow-up appointment with the physician? Yes     5. Do you have a primary care physician? Yes   a. If No, would you like for me to set you up with one? N/A  i. If Yes, “I will have our ED  give you a call right back at this number to work with you on the best time for an appointment.”    6. We are always looking to get better at what we do. Do you have any suggestions for what we can do to be even better? No   a. If Yes, \"Thank you for sharing your concerns. I apologize. I will follow up with our manager and patient . Would you like someone to call you back?\" No     7. Is there anything else I can do for you? N/A  Visit was sury Frey  01/09/18 7119    "

## 2018-06-29 ENCOUNTER — TRANSCRIBE ORDERS (OUTPATIENT)
Dept: ADMINISTRATIVE | Facility: HOSPITAL | Age: 73
End: 2018-06-29

## 2018-06-29 DIAGNOSIS — Z78.0 POSTMENOPAUSAL: ICD-10-CM

## 2018-06-29 DIAGNOSIS — Z12.31 ENCOUNTER FOR SCREENING MAMMOGRAM FOR MALIGNANT NEOPLASM OF BREAST: Primary | ICD-10-CM

## 2018-07-02 ENCOUNTER — HOSPITAL ENCOUNTER (OUTPATIENT)
Dept: BONE DENSITY | Facility: HOSPITAL | Age: 73
Discharge: HOME | End: 2018-07-02
Attending: INTERNAL MEDICINE

## 2018-07-02 ENCOUNTER — HOSPITAL ENCOUNTER (OUTPATIENT)
Dept: MAMMOGRAPHY | Facility: HOSPITAL | Age: 73
Discharge: HOME | End: 2018-07-02
Attending: INTERNAL MEDICINE | Admitting: INTERNAL MEDICINE

## 2018-07-02 DIAGNOSIS — Z78.0 POSTMENOPAUSAL: ICD-10-CM

## 2018-07-02 DIAGNOSIS — Z12.31 ENCOUNTER FOR SCREENING MAMMOGRAM FOR MALIGNANT NEOPLASM OF BREAST: ICD-10-CM

## 2018-07-02 PROCEDURE — 77067 SCR MAMMO BI INCL CAD: CPT

## 2018-07-02 PROCEDURE — 77080 DXA BONE DENSITY AXIAL: CPT

## 2018-07-02 PROCEDURE — 77063 BREAST TOMOSYNTHESIS BI: CPT

## 2018-07-17 ENCOUNTER — TELEPHONE (OUTPATIENT)
Dept: VASCULAR SURGERY | Facility: CLINIC | Age: 73
End: 2018-07-17

## 2018-07-17 NOTE — TELEPHONE ENCOUNTER
Spoke with Mrs Conner reminding her of her appointment tomorrow at 130 pm with Ebony GARRIDO. Mrs Conner confirmed she would be here.

## 2018-07-18 ENCOUNTER — OFFICE VISIT (OUTPATIENT)
Dept: VASCULAR SURGERY | Facility: CLINIC | Age: 73
End: 2018-07-18

## 2018-07-18 VITALS
SYSTOLIC BLOOD PRESSURE: 132 MMHG | HEART RATE: 68 BPM | BODY MASS INDEX: 33.12 KG/M2 | OXYGEN SATURATION: 98 % | DIASTOLIC BLOOD PRESSURE: 80 MMHG | WEIGHT: 194 LBS | HEIGHT: 64 IN

## 2018-07-18 DIAGNOSIS — I65.23 BILATERAL CAROTID ARTERY STENOSIS: ICD-10-CM

## 2018-07-18 DIAGNOSIS — I10 ESSENTIAL HYPERTENSION: ICD-10-CM

## 2018-07-18 DIAGNOSIS — M79.89 LEG SWELLING: Primary | ICD-10-CM

## 2018-07-18 DIAGNOSIS — E78.5 HYPERLIPIDEMIA, UNSPECIFIED HYPERLIPIDEMIA TYPE: ICD-10-CM

## 2018-07-18 PROBLEM — K57.92 DIVERTICULITIS: Status: ACTIVE | Noted: 2018-07-18

## 2018-07-18 PROBLEM — G47.00 INSOMNIA: Status: ACTIVE | Noted: 2017-10-21

## 2018-07-18 PROBLEM — K21.9 GASTROESOPHAGEAL REFLUX DISEASE: Status: ACTIVE | Noted: 2018-07-18

## 2018-07-18 PROBLEM — E03.9 HYPOTHYROIDISM: Status: ACTIVE | Noted: 2018-07-18

## 2018-07-18 PROCEDURE — 99203 OFFICE O/P NEW LOW 30 MIN: CPT | Performed by: NURSE PRACTITIONER

## 2018-07-18 RX ORDER — AMLODIPINE BESYLATE 2.5 MG/1
2.5 TABLET ORAL DAILY
COMMUNITY
End: 2020-10-01

## 2018-07-18 NOTE — PROGRESS NOTES
07/18/2018      Mich Cedillo MD  [address]  [facility] [address]  [place] [address]    Laura Conner  1945    Chief Complaint   Patient presents with   • Establish Care     Patients here to establish care. Patient c/o bilat leg swelling with cramping in her legs, she's also experiencing cramping in the right side of her neck that hurts so bad that she can hardly stand it. (Patient describes the pain/cramping in her legs/neck as feeling like a hilaria horse). Patient admits to having decreased vision lately.        Dear Mich Cedillo MD:      HPI  I had the pleasure of seeing your patient Laura Conner in the office today.  Thank you kindly for this consultation.  As you recall, Laura Conner is a 73 y.o.  female who you are currently following for routine health maintenance.  She has had complaints of leg swelling since about February per recall.  In further discussion regarding her medication review, she was started on Norvasc at that time following a hospitalization for hypertension.  She was also started on Lexapro and reports she thought this was the cause.  She does have some spider varicosities and reports cramping at night when she sleeps.  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  She denies wearing compression stockings, however did receive a script for them.  She denies any history of DVT.  She does have a family history of stroke.      Past Medical History:   Diagnosis Date   • Breast cancer (CMS/HCC) 1984    left breast cancer   • Gout    • Hypercholesteremia    • Hypertension    • Hypertriglyceridemia    • Hypothyroid    • Kidney cyst, acquired    • Leg edema    • Osteoarthritis    • Venous insufficiency        Past Surgical History:   Procedure Laterality Date   • AUGMENTATION MAMMAPLASTY     • BLADDER SUSPENSION     • BREAST BIOPSY     • BREAST IMPLANT SURGERY     • CATARACT EXTRACTION, BILATERAL     • FOOT SURGERY     • HYSTERECTOMY     • MASTECTOMY Bilateral 1984   •  OOPHORECTOMY     • THYROIDECTOMY, PARTIAL     • TONSILLECTOMY     • TOTAL KNEE ARTHROPLASTY Left        Family History   Problem Relation Age of Onset   • Breast cancer Sister    • Bone cancer Sister    • Stroke Sister    • Heart failure Mother    • Diabetes Mother    • Arthritis Mother    • Heart disease Father    • Stroke Father    • Prostate cancer Father    • Aneurysm Father    • Lung cancer Brother    • Diabetes Brother    • Heart disease Sister    • Parkinsonism Sister    • Stroke Paternal Grandmother        Social History     Social History   • Marital status:      Spouse name: N/A   • Number of children: N/A   • Years of education: N/A     Occupational History   • Not on file.     Social History Main Topics   • Smoking status: Never Smoker   • Smokeless tobacco: Never Used   • Alcohol use No   • Drug use: Unknown   • Sexual activity: Defer     Other Topics Concern   • Not on file     Social History Narrative   • No narrative on file       Allergies   Allergen Reactions   • Ciprofloxacin Other (See Comments)     Caused patient to go into kidney failure   • Morphine And Related Anxiety     Patient states that she couldn't sit still       Prior to Admission medications    Medication Sig Start Date End Date Taking? Authorizing Provider   amLODIPine (NORVASC) 2.5 MG tablet Take 1 tablet by mouth Daily. 1/2/18  Yes Neeraj Dailey MD   levothyroxine (SYNTHROID, LEVOTHROID) 100 MCG tablet Take 100 mcg by mouth Daily.   Yes Historical Provider, MD   meloxicam (MOBIC) 15 MG tablet Take 15 mg by mouth Daily.   Yes Historical Provider, MD   simvastatin (ZOCOR) 20 MG tablet Take 20 mg by mouth Every Night.   Yes Historical Provider, MD   valsartan (DIOVAN) 320 MG tablet Take 320 mg by mouth.   Yes Historical Provider, MD   clonazePAM (KLONOPIN) 0.5 MG tablet Take 1 tablet by mouth 2 (Two) Times a Day As Needed for Seizures. Prn anxiety 1/2/18 7/18/18  Neeraj Dailey MD       Review of Systems   Constitutional:  "Negative.    HENT: Negative.    Eyes: Negative.    Respiratory: Negative.    Cardiovascular: Positive for leg swelling (with cramping to BLE).   Gastrointestinal: Negative.    Endocrine: Negative.    Genitourinary: Negative.    Musculoskeletal: Positive for neck pain (intermittent muscle spasm right posterior neck).   Skin: Negative.    Allergic/Immunologic: Negative.    Neurological: Negative.    Hematological: Negative.    Psychiatric/Behavioral: The patient is nervous/anxious.        /80 (BP Location: Left arm, Patient Position: Sitting, Cuff Size: Adult)   Pulse 68   Ht 162.6 cm (64\")   Wt 88 kg (194 lb)   SpO2 98%   Breastfeeding? No   BMI 33.30 kg/m²   Physical Exam   Constitutional: She is oriented to person, place, and time. She appears well-developed and well-nourished. No distress.   HENT:   Head: Normocephalic and atraumatic.   Mouth/Throat: Oropharynx is clear and moist.   Eyes: Pupils are equal, round, and reactive to light. No scleral icterus.   Neck: Normal range of motion. Neck supple. No JVD present. Carotid bruit is not present. No thyromegaly present.   Cardiovascular: Normal rate, regular rhythm, S2 normal, normal heart sounds, intact distal pulses and normal pulses.  Exam reveals no gallop and no friction rub.    No murmur heard.  Pulmonary/Chest: Effort normal and breath sounds normal.   Abdominal: Soft. Normal aorta and bowel sounds are normal. There is no hepatosplenomegaly.   Musculoskeletal: Normal range of motion. She exhibits edema (to bilateral lower extremities).   Neurological: She is alert and oriented to person, place, and time. No cranial nerve deficit.   Skin: Skin is warm and dry. She is not diaphoretic.   Psychiatric: She has a normal mood and affect. Her behavior is normal. Judgment and thought content normal.   Nursing note and vitals reviewed.      Dexa Bone Density Axial    Result Date: 7/2/2018  Narrative: EXAMINATION: DEXA BONE DENSITY AXIAL- 7/2/2018 2:22 PM " CDT  HISTORY: 73-year-old female being screened for osteoporosis.  Bone densitometry has been performed using a Hologic Discovery C Model bone densitometer. The routine evaluation includes the lumbar spine and left hip.  The calculated bone density of the femoral neck is 0.722 g/cm2 which corresponds to a T-score of  -1.1 and a Z-score of 0.9.  The T-score corresponds to the number of standard deviations above or below the standard of a 30 year old patient.  The Z-score refers to the number of standard deviations above or below the mean for a person of the same age as this patient.  Evaluation of the lumbar spine demonstrates an average bone mineral density measurement of 0.882 g/cm2 which corresponds to a T-score of -1.5 and a Z-score of 0.8  Comparison: 6/21/2016, there is a decrease in bone density of the lumbar spine by -3.6%. There is a decrease in bone density of the left hip by -5.0%.      Impression: Impression:  1. Osteopenia. Low bone mass. The bone density is between 1.0 and 2.5 standard deviations below the mean for a young adult woman. There is an increased risk of fracture in these patients. This report was finalized on 07/02/2018 14:25 by Dr. Arnulfo Andrews MD.    Mammo Screening Digital Tomosynthesis Bilateral W Cad    Result Date: 7/2/2018  Narrative: MAMMO SCREENING DIGITAL TOMOSYNTHESIS BILATERAL W CAD- 7/2/2018 2:19 PM CDT  CLINICAL HISTORY: Breast cancer screening.  ADDITIONAL HISTORY: History of bilateral mastectomy in 1984. Augmentation mammoplasty.   COMPARISON STUDIES: Screening mammogram June 2017, June 2016, May 2015.  TECHNIQUE: 2-D and 3-D digital mammography of bilateral breasts was obtained. In addition routine views, implant displaced CC and MLO views were obtained. CAD was utilized for assistance in detection.  BREAST COMPOSITION: Category A -- The breast tissue is almost entirely fatty.  FINDINGS: No architectural distortion, suspicious masses, or microcalcifications are  identified. Bilateral prepectoral silicone implants are stable and intact.   IMPRESSION AND RECOMMENDATION: No mammographic evidence of malignancy. Recommendation is for the patient to return for routine mammography in one year or sooner if clinically indicated.  BI-RADS Category 1, negative.  The patient will receive a letter notifying her of the results of this exam.   This report was finalized on 07/02/2018 16:23 by Dr Cristopher August, .      Patient Active Problem List   Diagnosis   • Diverticulitis   • Diverticulosis   • Essential hypertension   • Gastroesophageal reflux disease   • History of colon polyps   • Hyperlipidemia   • Hypothyroidism   • Insomnia   • Irritable bowel syndrome with diarrhea   • Primary osteoarthritis of left knee   • BMI 33.0-33.9,adult         ICD-10-CM ICD-9-CM   1. Leg swelling M79.89 729.81   2. Bilateral carotid artery stenosis I65.23 433.10     433.30   3. Essential hypertension I10 401.9   4. Hyperlipidemia, unspecified hyperlipidemia type E78.5 272.4       Plan: After thoroughly evaluating Laura Conner, I believe the best course of action is to initially remain conservative from a vascular standpoint.  I will give the patient a prescription for compression stockings in the 20-30 mm pressure gradient range.  I did instruct the patient on how to wear these on a daily basis.  We will see the patient back in 3 months with a venous valvular insufficiency study.  She will speak with Dr. Mart's office regarding Norvasc, as this may be contributing to her leg edema.  Given her risk factors and family history of stroke, I will order a carotid duplex as well.  Body mass index is 33.3 kg/m². BMI chart given.  The patient can continue taking her current medication regimen as previously planned.  This was all discussed in full with complete understanding.    Thank you for allowing me to participate in the care of your patient.  Please do not hesitate with any questions or concerns.  I will  keep you aware of any further encounters with Laura Conner.        Sincerely yours,         RADHIKA Bryan MD

## 2018-08-23 ENCOUNTER — APPOINTMENT (OUTPATIENT)
Dept: CT IMAGING | Age: 73
End: 2018-08-23
Payer: MEDICARE

## 2018-08-23 ENCOUNTER — APPOINTMENT (OUTPATIENT)
Dept: GENERAL RADIOLOGY | Age: 73
End: 2018-08-23
Payer: MEDICARE

## 2018-08-23 ENCOUNTER — HOSPITAL ENCOUNTER (EMERGENCY)
Age: 73
Discharge: HOME | End: 2018-08-23
Attending: EMERGENCY MEDICINE
Payer: MEDICARE

## 2018-08-23 VITALS
RESPIRATION RATE: 14 BRPM | HEART RATE: 63 BPM | TEMPERATURE: 98.1 F | OXYGEN SATURATION: 94 % | DIASTOLIC BLOOD PRESSURE: 69 MMHG | HEIGHT: 64 IN | WEIGHT: 185 LBS | SYSTOLIC BLOOD PRESSURE: 140 MMHG | BODY MASS INDEX: 31.58 KG/M2

## 2018-08-23 DIAGNOSIS — M54.2 NECK PAIN: ICD-10-CM

## 2018-08-23 DIAGNOSIS — R51.9 NONINTRACTABLE HEADACHE, UNSPECIFIED CHRONICITY PATTERN, UNSPECIFIED HEADACHE TYPE: ICD-10-CM

## 2018-08-23 DIAGNOSIS — R55 SYNCOPE, UNSPECIFIED SYNCOPE TYPE: ICD-10-CM

## 2018-08-23 DIAGNOSIS — N17.9 ACUTE RENAL FAILURE, UNSPECIFIED ACUTE RENAL FAILURE TYPE (HCC): Primary | ICD-10-CM

## 2018-08-23 LAB
ALBUMIN SERPL-MCNC: 4.9 G/DL (ref 3.5–5.2)
ALP BLD-CCNC: 72 U/L (ref 35–104)
ALT SERPL-CCNC: 18 U/L (ref 5–33)
ANION GAP SERPL CALCULATED.3IONS-SCNC: 17 MMOL/L (ref 7–19)
AST SERPL-CCNC: 20 U/L (ref 5–32)
BASOPHILS ABSOLUTE: 0.1 K/UL (ref 0–0.2)
BASOPHILS RELATIVE PERCENT: 0.9 % (ref 0–1)
BILIRUB SERPL-MCNC: 0.5 MG/DL (ref 0.2–1.2)
BILIRUBIN URINE: NEGATIVE
BLOOD, URINE: NEGATIVE
BUN BLDV-MCNC: 26 MG/DL (ref 8–23)
CALCIUM SERPL-MCNC: 10.1 MG/DL (ref 8.8–10.2)
CHLORIDE BLD-SCNC: 97 MMOL/L (ref 98–111)
CLARITY: CLEAR
CO2: 24 MMOL/L (ref 22–29)
COLOR: YELLOW
CREAT SERPL-MCNC: 1.4 MG/DL (ref 0.5–0.9)
EOSINOPHILS ABSOLUTE: 0.9 K/UL (ref 0–0.6)
EOSINOPHILS RELATIVE PERCENT: 6.5 % (ref 0–5)
GFR NON-AFRICAN AMERICAN: 37
GLUCOSE BLD-MCNC: 109 MG/DL
GLUCOSE BLD-MCNC: 109 MG/DL (ref 70–99)
GLUCOSE BLD-MCNC: 131 MG/DL (ref 74–109)
GLUCOSE URINE: NEGATIVE MG/DL
HCT VFR BLD CALC: 42.4 % (ref 37–47)
HEMOGLOBIN: 13.7 G/DL (ref 12–16)
KETONES, URINE: NEGATIVE MG/DL
LEUKOCYTE ESTERASE, URINE: NEGATIVE
LYMPHOCYTES ABSOLUTE: 3.8 K/UL (ref 1.1–4.5)
LYMPHOCYTES RELATIVE PERCENT: 28.1 % (ref 20–40)
MCH RBC QN AUTO: 28.1 PG (ref 27–31)
MCHC RBC AUTO-ENTMCNC: 32.3 G/DL (ref 33–37)
MCV RBC AUTO: 86.9 FL (ref 81–99)
MONOCYTES ABSOLUTE: 0.9 K/UL (ref 0–0.9)
MONOCYTES RELATIVE PERCENT: 6.7 % (ref 0–10)
NEUTROPHILS ABSOLUTE: 7.7 K/UL (ref 1.5–7.5)
NEUTROPHILS RELATIVE PERCENT: 57.4 % (ref 50–65)
NITRITE, URINE: NEGATIVE
PDW BLD-RTO: 14.1 % (ref 11.5–14.5)
PERFORMED ON: ABNORMAL
PERFORMED ON: NORMAL
PH UA: 6
PLATELET # BLD: 327 K/UL (ref 130–400)
PMV BLD AUTO: 10.7 FL (ref 9.4–12.3)
POC TROPONIN I: 0 NG/ML (ref 0–0.08)
POTASSIUM SERPL-SCNC: 4.4 MMOL/L (ref 3.5–5)
PROTEIN UA: NEGATIVE MG/DL
RBC # BLD: 4.88 M/UL (ref 4.2–5.4)
SODIUM BLD-SCNC: 138 MMOL/L (ref 136–145)
SPECIFIC GRAVITY UA: 1.01
TOTAL PROTEIN: 7.6 G/DL (ref 6.6–8.7)
TROPONIN: <0.01 NG/ML (ref 0–0.03)
URINE REFLEX TO CULTURE: NORMAL
UROBILINOGEN, URINE: 0.2 E.U./DL
WBC # BLD: 13.4 K/UL (ref 4.8–10.8)

## 2018-08-23 PROCEDURE — 36415 COLL VENOUS BLD VENIPUNCTURE: CPT

## 2018-08-23 PROCEDURE — 96374 THER/PROPH/DIAG INJ IV PUSH: CPT

## 2018-08-23 PROCEDURE — 71045 X-RAY EXAM CHEST 1 VIEW: CPT

## 2018-08-23 PROCEDURE — 80053 COMPREHEN METABOLIC PANEL: CPT

## 2018-08-23 PROCEDURE — 6360000002 HC RX W HCPCS: Performed by: EMERGENCY MEDICINE

## 2018-08-23 PROCEDURE — 99285 EMERGENCY DEPT VISIT HI MDM: CPT

## 2018-08-23 PROCEDURE — 2580000003 HC RX 258: Performed by: EMERGENCY MEDICINE

## 2018-08-23 PROCEDURE — 71046 X-RAY EXAM CHEST 2 VIEWS: CPT

## 2018-08-23 PROCEDURE — 82948 REAGENT STRIP/BLOOD GLUCOSE: CPT

## 2018-08-23 PROCEDURE — 70450 CT HEAD/BRAIN W/O DYE: CPT

## 2018-08-23 PROCEDURE — 99284 EMERGENCY DEPT VISIT MOD MDM: CPT | Performed by: EMERGENCY MEDICINE

## 2018-08-23 PROCEDURE — 93005 ELECTROCARDIOGRAM TRACING: CPT

## 2018-08-23 PROCEDURE — 85025 COMPLETE CBC W/AUTO DIFF WBC: CPT

## 2018-08-23 PROCEDURE — 72125 CT NECK SPINE W/O DYE: CPT

## 2018-08-23 PROCEDURE — 84484 ASSAY OF TROPONIN QUANT: CPT

## 2018-08-23 PROCEDURE — 81003 URINALYSIS AUTO W/O SCOPE: CPT

## 2018-08-23 RX ORDER — METOCLOPRAMIDE HYDROCHLORIDE 5 MG/ML
10 INJECTION INTRAMUSCULAR; INTRAVENOUS ONCE
Status: DISCONTINUED | OUTPATIENT
Start: 2018-08-23 | End: 2018-08-23

## 2018-08-23 RX ORDER — IRBESARTAN 300 MG/1
300 TABLET ORAL DAILY
COMMUNITY
End: 2019-05-06

## 2018-08-23 RX ORDER — 0.9 % SODIUM CHLORIDE 0.9 %
500 INTRAVENOUS SOLUTION INTRAVENOUS ONCE
Status: COMPLETED | OUTPATIENT
Start: 2018-08-23 | End: 2018-08-23

## 2018-08-23 RX ORDER — AMLODIPINE BESYLATE 5 MG/1
2.5 TABLET ORAL DAILY
COMMUNITY
End: 2020-08-03 | Stop reason: ALTCHOICE

## 2018-08-23 RX ORDER — TRIAMTERENE AND HYDROCHLOROTHIAZIDE 37.5; 25 MG/1; MG/1
1 TABLET ORAL DAILY
COMMUNITY
End: 2018-08-23 | Stop reason: SINTOL

## 2018-08-23 RX ORDER — DIPHENHYDRAMINE HYDROCHLORIDE 50 MG/ML
25 INJECTION INTRAMUSCULAR; INTRAVENOUS ONCE
Status: DISCONTINUED | OUTPATIENT
Start: 2018-08-23 | End: 2018-08-23

## 2018-08-23 RX ORDER — LORAZEPAM 2 MG/ML
1 INJECTION INTRAMUSCULAR ONCE
Status: COMPLETED | OUTPATIENT
Start: 2018-08-23 | End: 2018-08-23

## 2018-08-23 RX ORDER — MELOXICAM 15 MG/1
15 TABLET ORAL DAILY
Status: ON HOLD | COMMUNITY
End: 2020-11-12

## 2018-08-23 RX ADMIN — SODIUM CHLORIDE 500 ML: 9 INJECTION, SOLUTION INTRAVENOUS at 21:09

## 2018-08-23 RX ADMIN — LORAZEPAM 1 MG: 2 INJECTION INTRAMUSCULAR; INTRAVENOUS at 22:41

## 2018-08-23 ASSESSMENT — PAIN DESCRIPTION - PAIN TYPE: TYPE: ACUTE PAIN

## 2018-08-23 ASSESSMENT — ENCOUNTER SYMPTOMS
SHORTNESS OF BREATH: 0
DIARRHEA: 0
ABDOMINAL PAIN: 0
VOMITING: 1
EYE PAIN: 0

## 2018-08-23 ASSESSMENT — PAIN SCALES - GENERAL
PAINLEVEL_OUTOF10: 6
PAINLEVEL_OUTOF10: 0
PAINLEVEL_OUTOF10: 6

## 2018-08-23 ASSESSMENT — PAIN DESCRIPTION - LOCATION
LOCATION: HEAD
LOCATION: HEAD

## 2018-08-23 NOTE — ED NOTES
Bed: 12  Expected date:   Expected time:   Means of arrival: [facility]  Comments:  EMS: syncope     Mikayla Ratliff RN  08/23/18 5751

## 2018-08-24 LAB
EKG P AXIS: 33 DEGREES
EKG P AXIS: 61 DEGREES
EKG P-R INTERVAL: 166 MS
EKG P-R INTERVAL: 176 MS
EKG Q-T INTERVAL: 420 MS
EKG Q-T INTERVAL: 430 MS
EKG QRS DURATION: 80 MS
EKG QRS DURATION: 88 MS
EKG QTC CALCULATION (BAZETT): 425 MS
EKG QTC CALCULATION (BAZETT): 433 MS
EKG T AXIS: 33 DEGREES
EKG T AXIS: 51 DEGREES

## 2018-08-24 NOTE — ED NOTES
Visual acuity exam, pt 20/25 left eye, unable to see eye chart with right eye, reports blurry, reports has lens implants in both eyes, \" LEFT EYE IS FOR DISTANCE, RIGHT EYE IS FOR UP CLOSE\"     Aurelia Forde RN  08/23/18 7854

## 2018-08-24 NOTE — ED PROVIDER NOTES
Oral 60  96 % 5' 4\" (1.626 m) 185 lb (83.9 kg)       Physical Exam   Constitutional: She is oriented to person, place, and time. She appears well-developed. No distress. HENT:   Head: Normocephalic and atraumatic. Right Ear: Hearing, tympanic membrane, external ear and ear canal normal.   Left Ear: Hearing, tympanic membrane, external ear and ear canal normal.   Eyes: Pupils are equal, round, and reactive to light. EOM are normal. No scleral icterus. Fundoscopic exam:       The right eye shows no hemorrhage and no papilledema. The left eye shows no hemorrhage and no papilledema. Neck: Normal range of motion and full passive range of motion without pain. Neck supple. No JVD present. No spinous process tenderness and no muscular tenderness present. No neck rigidity. Cardiovascular: Normal rate, regular rhythm, normal heart sounds and intact distal pulses. Pulmonary/Chest: Effort normal and breath sounds normal. No respiratory distress. Abdominal: Soft. She exhibits no distension. There is no tenderness. There is no rebound and no guarding. Musculoskeletal: She exhibits no edema or tenderness. Neurological: She is alert and oriented to person, place, and time. She has normal strength. No cranial nerve deficit or sensory deficit. Coordination and gait normal. GCS eye subscore is 4. GCS verbal subscore is 5. GCS motor subscore is 6. Normal finger-nose bilaterally. No pronator drift. No visual field deficit. Skin: Skin is warm and dry. Psychiatric: Her behavior is normal. Her mood appears anxious. Vitals reviewed. DIAGNOSTIC RESULTS     EKG: All EKG's are interpreted by the Emergency Department Physician who either signs or Co-signs this chart in the absence of a cardiologist.    Normal sinus rhythm. Normal QT interval. No evidence of acute ischemia. Repeat EKG shows normal sinus rhythm. Normal QT interval. No evidence of ischemia.     RADIOLOGY:   Non-plain film images such as CT, Ultrasound and MRI are read by the radiologist. Plain radiographic images are visualized and preliminarily interpreted by the emergency physician with the below findings:    Interpretation per the Radiologist below, if available at the time of this note:    XR CHEST STANDARD (2 VW)   Final Result   Impression:   1. No acute cardiopulmonary process. Signed by Dr Parviz Askew on 8/23/2018 9:41 PM      CT Cervical Spine WO Contrast   Final Result   1. No CT evidence of acute bony injury to the cervical spine. 2. Degenerative changes. Signed by Dr Parviz Askew on 8/23/2018 9:24 PM      CT Head WO Contrast   Final Result   Impression:    1. No CT evidence of an acute intracranial process. Signed by Dr Parviz Askew on 8/23/2018 9:12 PM            LABS:  Labs Reviewed   CBC WITH AUTO DIFFERENTIAL - Abnormal; Notable for the following:        Result Value    WBC 13.4 (*)     MCHC 32.3 (*)     Eosinophils % 6.5 (*)     Neutrophils # 7.7 (*)     Eosinophils # 0.90 (*)     All other components within normal limits   COMPREHENSIVE METABOLIC PANEL - Abnormal; Notable for the following:     Chloride 97 (*)     Glucose 131 (*)     BUN 26 (*)     CREATININE 1.4 (*)     GFR Non- 37 (*)     All other components within normal limits   POCT GLUCOSE - Abnormal; Notable for the following:     POC Glucose 109 (*)     All other components within normal limits   POCT GLUCOSE - Normal   TROPONIN   URINE RT REFLEX TO CULTURE   POCT TROPONIN   POCT VENOUS       All other labs were within normal range or not returned as of this dictation.     EMERGENCY DEPARTMENT COURSE and DIFFERENTIAL DIAGNOSIS/MDM:   Vitals:    Vitals:    08/23/18 1837 08/23/18 1840 08/23/18 2218   BP: 133/70  (!) 158/80   Pulse: 60  76   Resp:   14   Temp: 98.2 °F (36.8 °C)  97.7 °F (36.5 °C)   TempSrc: Oral  Temporal   SpO2: 96%  92%   Weight:  185 lb (83.9 kg)    Height:  5' 4\" (1.626 m)        MDM   Patient's nontoxic on exam. She's is in no distress. She feels better. Vitals are stable. All of her symptoms have been present for several months except for the syncope she had today. She's had similar pain in her neck, similar headache and blurry vision off and on for several months. Again she is very clear these are not new symptoms today. Headache not described as the worst her life. No thunderclap onset. No fever. No neck stiffness. No meningeal signs. See no indication for spinal tap at this time. I think the most likely cause for syncope is probably the diuretic she was recently started on. She has evidence of mild acute renal failure which I suspect is likely from the Maxzide. Suspect this likely contributed to her syncopal episode tonight as well. She was given a small fluid bolus. She is feeling better now. Her vitals are stable. Offered to admit for further monitoring and evaluation but the patient declined admission. Would rather go home and follow-up with her primary doctor. Told her to discontinue the diuretic she was started on and call her PCP in the morning for further evaluation. Told to drink plenty of fluids and rest. Told to return immediately for change or worsening symptoms or new concerns. Patient agreeable with plan. I have discussed my findings, my impression, and my differential diagnosis to the patient. The patient understands the risks, benefits, and alternatives of an inpatient versus outpatient continued evaluation and treatment. The patient has capacity to make medical decisions. The patient declines hospital admission or further observation in the emergency department. The patient understands the importance of follow-up and agrees to return if the condition changes, worsens, or if the patient changes his/her mind about inpatient evaluation and care. CONSULTS:  None    PROCEDURES:  Unless otherwise noted below, none     Procedures    FINAL IMPRESSION      1.  Acute renal failure, unspecified acute renal failure type ([facility] [address]    2. Syncope, unspecified syncope type    3. Nonintractable headache, unspecified chronicity pattern, unspecified headache type    4.  Neck pain          DISPOSITION/PLAN   DISPOSITION Decision To Discharge 08/23/2018 11:05:29 PM      PATIENT REFERRED TO:  [facility] EMERGENCY DEPT  [address] [address] [phone]    As needed, If symptoms worsen    Ned Randhawa MD  [address] [phone]    Schedule an appointment as soon as possible for a visit in 1 day        DISCHARGE MEDICATIONS:  Current Discharge Medication List             (Please note that portions of this note were completed with a voice recognition program.  Efforts were made to edit the dictations but occasionally words are mis-transcribed.)    Ahsan Gomez MD (electronically signed)  Attending Emergency Physician        Ahsan Gomez MD  08/23/18 1733

## 2018-08-24 NOTE — ED NOTES
Ambulatory to [facility], for visual acuity test and back to room, tolerated well     Ignacia Mueller RN  08/23/18 1230

## 2018-10-17 ENCOUNTER — TELEPHONE (OUTPATIENT)
Dept: VASCULAR SURGERY | Facility: CLINIC | Age: 73
End: 2018-10-17

## 2018-10-17 ENCOUNTER — HOSPITAL ENCOUNTER (OUTPATIENT)
Dept: ULTRASOUND IMAGING | Facility: HOSPITAL | Age: 73
Discharge: HOME | End: 2018-10-17
Admitting: NURSE PRACTITIONER

## 2018-10-17 ENCOUNTER — HOSPITAL ENCOUNTER (OUTPATIENT)
Dept: ULTRASOUND IMAGING | Facility: HOSPITAL | Age: 73
Discharge: HOME | End: 2018-10-17

## 2018-10-17 DIAGNOSIS — M79.89 LEG SWELLING: ICD-10-CM

## 2018-10-17 DIAGNOSIS — I65.23 BILATERAL CAROTID ARTERY STENOSIS: ICD-10-CM

## 2018-10-17 PROCEDURE — 93970 EXTREMITY STUDY: CPT

## 2018-10-17 PROCEDURE — 93970 EXTREMITY STUDY: CPT | Performed by: SURGERY

## 2018-10-17 PROCEDURE — 93880 EXTRACRANIAL BILAT STUDY: CPT

## 2018-10-17 PROCEDURE — 93880 EXTRACRANIAL BILAT STUDY: CPT | Performed by: SURGERY

## 2018-10-17 NOTE — TELEPHONE ENCOUNTER
Spoke with Mrs Conner reminding her of her appointment for Thursday, October 18th, 2018 at 130 pm with Dr Bentley. Mrs Conner confirmed she would be here.     Test Complete 10/17/18

## 2018-10-18 ENCOUNTER — OFFICE VISIT (OUTPATIENT)
Dept: VASCULAR SURGERY | Facility: CLINIC | Age: 73
End: 2018-10-18

## 2018-10-18 VITALS
SYSTOLIC BLOOD PRESSURE: 136 MMHG | HEIGHT: 64 IN | DIASTOLIC BLOOD PRESSURE: 76 MMHG | BODY MASS INDEX: 31.58 KG/M2 | OXYGEN SATURATION: 98 % | HEART RATE: 77 BPM | WEIGHT: 185 LBS

## 2018-10-18 DIAGNOSIS — Z01.818 PREOP TESTING: ICD-10-CM

## 2018-10-18 DIAGNOSIS — I87.2 VENOUS INSUFFICIENCY: Primary | ICD-10-CM

## 2018-10-18 DIAGNOSIS — I10 ESSENTIAL HYPERTENSION: ICD-10-CM

## 2018-10-18 DIAGNOSIS — Z51.81 ENCOUNTER FOR MONITORING ANTIPLATELET THERAPY: ICD-10-CM

## 2018-10-18 DIAGNOSIS — Z79.02 ENCOUNTER FOR MONITORING ANTIPLATELET THERAPY: ICD-10-CM

## 2018-10-18 DIAGNOSIS — E78.5 HYPERLIPIDEMIA, UNSPECIFIED HYPERLIPIDEMIA TYPE: ICD-10-CM

## 2018-10-18 DIAGNOSIS — I65.23 BILATERAL CAROTID ARTERY STENOSIS: ICD-10-CM

## 2018-10-18 PROCEDURE — 99214 OFFICE O/P EST MOD 30 MIN: CPT | Performed by: SURGERY

## 2018-10-18 RX ORDER — IRBESARTAN 300 MG/1
300 TABLET ORAL NIGHTLY
COMMUNITY
End: 2018-11-28

## 2018-10-18 RX ORDER — ASPIRIN 81 MG/1
81 TABLET ORAL DAILY
COMMUNITY
End: 2020-10-01

## 2018-10-18 NOTE — PROGRESS NOTES
"10/18/2018       Mich Cedillo MD  [address]  [place] [address]    Laura Conner  1945    Chief Complaint   Patient presents with   • Follow-up     3 Month Follow UP For Bilateral Carotid Artery Stenosis and Leg Swelling. Test 10/17/18 US pad carotid bilateral and US pad venous lower ext bilat. Patient denies any stroke like symptoms.        Dear Mich Cedillo MD       HPI  I had the pleasure of seeing your patient Laura Conner in the office today.    As you recall, Laura Conner is a 73 y.o.  female who you are currently following for routine health maintenance.  She has had complaints of leg swelling since about February per recall.  In further discussion regarding her medication review, she was started on Norvasc.  This was recently discontinued and leg swelling improved.  However, her blood pressure elevated and was started back on a lower dose along with HCTZ.  S She does have some spider varicosities and reports cramping at night when she sleeps.  She reports heaviness and tiredness.  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  She did have noninasive testing performed, which I did review in office.         Review of Systems   Constitutional: Negative.    HENT: Negative.    Eyes: Negative.    Respiratory: Negative.    Cardiovascular: Positive for leg swelling (with cramping to BLE).   Gastrointestinal: Negative.    Endocrine: Negative.    Genitourinary: Negative.    Musculoskeletal: Positive for neck pain (intermittent muscle spasm right posterior neck).   Skin: Negative.    Allergic/Immunologic: Negative.    Neurological: Negative.    Hematological: Negative.    Psychiatric/Behavioral: The patient is nervous/anxious.        /76 (BP Location: Right arm, Patient Position: Sitting, Cuff Size: Adult)   Pulse 77   Ht 162.6 cm (64\")   Wt 83.9 kg (185 lb)   SpO2 98%   BMI 31.76 kg/m²   Physical " Exam   Constitutional: She is oriented to person, place, and time. She appears well-developed and well-nourished. No distress.   HENT:   Head: Normocephalic and atraumatic.   Mouth/Throat: Oropharynx is clear and moist.   Eyes: Pupils are equal, round, and reactive to light. No scleral icterus.   Neck: Normal range of motion. Neck supple. No JVD present. Carotid bruit is not present. No thyromegaly present.   Cardiovascular: Normal rate, regular rhythm, S2 normal, normal heart sounds, intact distal pulses and normal pulses.  Exam reveals no gallop and no friction rub.    No murmur heard.  Pulmonary/Chest: Effort normal and breath sounds normal.   Abdominal: Soft. Normal aorta and bowel sounds are normal. There is no hepatosplenomegaly.   Musculoskeletal: Normal range of motion. She exhibits edema (to bilateral lower extremities).   Neurological: She is alert and oriented to person, place, and time. No cranial nerve deficit.   Skin: Skin is warm and dry. She is not diaphoretic.   Psychiatric: She has a normal mood and affect. Her behavior is normal. Judgment and thought content normal.   Nursing note and vitals reviewed.      Us Carotid Bilateral    Result Date: 10/17/2018  Narrative: History: Carotid occlusive disease      Impression: Impression: 1. There is less than 50% stenosis of the right internal carotid artery. 2. There is 50-69% stenosis of the left internal carotid artery. 3. Antegrade flow is demonstrated in bilateral vertebral arteries.  Comments: Bilateral carotid vertebral arterial duplex scan was performed.  Grayscale imaging shows intimal thickening and calcified elements at the carotid bifurcation. The right internal carotid artery peak systolic velocity is 57 cm/sec. The end-diastolic velocity is 23.2 cm/sec. The right ICA/CCA ratio is approximately 0.78 . These findings correlate with less than 50% stenosis of the right internal carotid artery.  Grayscale imaging shows intimal thickening and  calcified elements at the carotid bifurcation. The left internal carotid artery peak systolic velocity is 141 cm/sec. The end-diastolic velocity is 46.2 cm/sec. The left ICA/CCA ratio is approximately 1.87 . These findings correlate with 50-69% stenosis of the left internal carotid artery.  Antegrade flow is demonstrated in bilateral vertebral arteries.    This report was finalized on [id number] by Dr. Maco Bentley MD.    Us Venous Doppler Lower Extremity Bilateral (duplex)    Result Date: 10/17/2018  Narrative: History: Swelling   Comments: Venous valvular insufficiency testing was performed in both legs using duplex ultrasound with rapid cuff deflation technique.  The common femoral veins, popliteal veins, posterior tibial veins, greater saphenous veins, and lesser saphenous veins were interrogated bilaterally.  On the right, the greater saphenous vein at the junction measured 6.7 mm. In the mid thigh measured 2.2 mm. Above the knee measured 3.1 mm. In the mid calf measured 2.3 mm. At the ankle measured 2.3 mm. There is greater than 0.5 seconds of reflux at the junction and from the mid calf to the ankle. The lesser saphenous vein is within normal limits and no evidence of reflux. There is no evidence of DVT.  On the left, the greater saphenous vein at the junction measured 6.6 mm. In the mid thigh measured 2.8 mm. Above the knee measured 2.9 mm. In the mid calf measured 2 mm. At the ankle measured 2.9 mm. There is greater than 0.5 seconds of reflux above the knee. The lesser saphenous vein is within normal limits and no evidence of reflux. There is no evidence of DVT.       Impression: Impression: There is evidence of venous insufficiency in both lower extremity greater saphenous veins right greater than left.     This report was finalized on 10/17/2018 13:16 by Dr. Maco Bentley MD.      Patient Active Problem List   Diagnosis   • Diverticulitis   • Diverticulosis   • Essential hypertension   •  Gastroesophageal reflux disease   • History of colon polyps   • Hyperlipidemia   • Hypothyroidism   • Insomnia   • Irritable bowel syndrome with diarrhea   • Primary osteoarthritis of left knee   • BMI 33.0-33.9,adult         ICD-10-CM ICD-9-CM   1. Venous insufficiency I87.2 459.81   2. Bilateral carotid artery stenosis I65.23 433.10     433.30   3. Essential hypertension I10 401.9   4. Hyperlipidemia, unspecified hyperlipidemia type E78.5 272.4   5. Preop testing Z01.818 V72.84   6. Encounter for monitoring antiplatelet therapy Z51.81 V58.83    Z79.02 V58.63       Plan: After thoroughly evaluating Laura Conner, I believe the best course of action is to proceed with a radiofrequency ablation of the left lower extremity greater saphenous vein, followed by the right.   Risks of radiofrequency ablation include, but are not limited to, bleeding, infection, vessel damage, nerve damage, DVT, phlebitis, and pulmonary embolus.  The patient understands these risks and wishes to proceed with procedure.   I would like her to continue wearing compression stockings in the 20-30 mm pressure gradient range.  I did instruct the patient on how to wear these on a daily basis.  As far as her carotid disease, we will recheck this in 1 year.   Body mass index is 31.76 kg/m². BMI chart given.  The patient can continue taking her current medication regimen as previously planned.  This was all discussed in full with complete understanding.    Thank you for allowing me to participate in the care of your patient.  Please do not hesitate with any questions or concerns.  I will keep you aware of any further encounters with Laura Conner.        Sincerely yours,         DO Mamadou Staples Danny Neale, MD

## 2018-10-22 ENCOUNTER — TELEPHONE (OUTPATIENT)
Dept: VASCULAR SURGERY | Facility: CLINIC | Age: 73
End: 2018-10-22

## 2018-10-22 PROBLEM — Z01.818 PREOP TESTING: Status: ACTIVE | Noted: 2018-10-22

## 2018-10-22 PROBLEM — I87.2 VENOUS INSUFFICIENCY: Status: ACTIVE | Noted: 2018-10-22

## 2018-10-22 NOTE — TELEPHONE ENCOUNTER
SURGERY IS SCHEDULED FOR 11/5/18 WITH A 5:15 AM ARRIVAL. PREWORK IS SCHEDULED FOR 10/29/18 WITH A 12:15 PM ARRIVAL TIME.  I HAD TO LEAVE A MESSAGE.

## 2018-10-29 ENCOUNTER — APPOINTMENT (OUTPATIENT)
Dept: PREADMISSION TESTING | Facility: HOSPITAL | Age: 73
End: 2018-10-29

## 2018-10-29 ENCOUNTER — HOSPITAL ENCOUNTER (OUTPATIENT)
Dept: GENERAL RADIOLOGY | Facility: HOSPITAL | Age: 73
Discharge: HOME | End: 2018-10-29
Admitting: NURSE PRACTITIONER

## 2018-10-29 VITALS
WEIGHT: 191.8 LBS | BODY MASS INDEX: 32.74 KG/M2 | OXYGEN SATURATION: 97 % | HEIGHT: 64 IN | SYSTOLIC BLOOD PRESSURE: 141 MMHG | DIASTOLIC BLOOD PRESSURE: 64 MMHG | HEART RATE: 82 BPM

## 2018-10-29 DIAGNOSIS — I10 HTN (HYPERTENSION): ICD-10-CM

## 2018-10-29 DIAGNOSIS — Z01.818 PREOP TESTING: ICD-10-CM

## 2018-10-29 DIAGNOSIS — Z79.02 ENCOUNTER FOR MONITORING ANTIPLATELET THERAPY: ICD-10-CM

## 2018-10-29 DIAGNOSIS — I87.2 VENOUS INSUFFICIENCY: ICD-10-CM

## 2018-10-29 DIAGNOSIS — Z51.81 ENCOUNTER FOR MONITORING ANTIPLATELET THERAPY: ICD-10-CM

## 2018-10-29 LAB
ANION GAP SERPL CALCULATED.3IONS-SCNC: 16 MMOL/L (ref 4–13)
APTT PPP: 25.8 SECONDS (ref 24.1–34.8)
BASOPHILS # BLD AUTO: 0.09 10*3/MM3 (ref 0–0.2)
BASOPHILS NFR BLD AUTO: 1 % (ref 0–2)
BUN BLD-MCNC: 21 MG/DL (ref 5–21)
BUN/CREAT SERPL: 22.1 (ref 7–25)
CALCIUM SPEC-SCNC: 9.7 MG/DL (ref 8.4–10.4)
CHLORIDE SERPL-SCNC: 96 MMOL/L (ref 98–110)
CO2 SERPL-SCNC: 28 MMOL/L (ref 24–31)
CREAT BLD-MCNC: 0.95 MG/DL (ref 0.5–1.4)
DEPRECATED RDW RBC AUTO: 44 FL (ref 40–54)
EOSINOPHIL # BLD AUTO: 0.92 10*3/MM3 (ref 0–0.7)
EOSINOPHIL NFR BLD AUTO: 10.1 % (ref 0–4)
ERYTHROCYTE [DISTWIDTH] IN BLOOD BY AUTOMATED COUNT: 14.1 % (ref 12–15)
GFR SERPL CREATININE-BSD FRML MDRD: 58 ML/MIN/1.73
GLUCOSE BLD-MCNC: 105 MG/DL (ref 70–100)
HCT VFR BLD AUTO: 40.5 % (ref 37–47)
HGB BLD-MCNC: 13.6 G/DL (ref 12–16)
IMM GRANULOCYTES # BLD: 0.04 10*3/MM3 (ref 0–0.03)
IMM GRANULOCYTES NFR BLD: 0.4 % (ref 0–5)
INR PPP: 0.92 (ref 0.91–1.09)
LYMPHOCYTES # BLD AUTO: 3.21 10*3/MM3 (ref 0.72–4.86)
LYMPHOCYTES NFR BLD AUTO: 35.2 % (ref 15–45)
MCH RBC QN AUTO: 29 PG (ref 28–32)
MCHC RBC AUTO-ENTMCNC: 33.6 G/DL (ref 33–36)
MCV RBC AUTO: 86.4 FL (ref 82–98)
MONOCYTES # BLD AUTO: 0.67 10*3/MM3 (ref 0.19–1.3)
MONOCYTES NFR BLD AUTO: 7.3 % (ref 4–12)
NEUTROPHILS # BLD AUTO: 4.2 10*3/MM3 (ref 1.87–8.4)
NEUTROPHILS NFR BLD AUTO: 46 % (ref 39–78)
NRBC BLD MANUAL-RTO: 0 /100 WBC (ref 0–0)
PLATELET # BLD AUTO: 313 10*3/MM3 (ref 130–400)
PMV BLD AUTO: 10.4 FL (ref 6–12)
POTASSIUM BLD-SCNC: 3.6 MMOL/L (ref 3.5–5.3)
PROTHROMBIN TIME: 12.6 SECONDS (ref 11.9–14.6)
RBC # BLD AUTO: 4.69 10*6/MM3 (ref 4.2–5.4)
SODIUM BLD-SCNC: 140 MMOL/L (ref 135–145)
WBC NRBC COR # BLD: 9.13 10*3/MM3 (ref 4.8–10.8)

## 2018-10-29 PROCEDURE — 36415 COLL VENOUS BLD VENIPUNCTURE: CPT

## 2018-10-29 PROCEDURE — 93005 ELECTROCARDIOGRAM TRACING: CPT

## 2018-10-29 PROCEDURE — 71046 X-RAY EXAM CHEST 2 VIEWS: CPT

## 2018-10-29 PROCEDURE — 85025 COMPLETE CBC W/AUTO DIFF WBC: CPT | Performed by: NURSE PRACTITIONER

## 2018-10-29 PROCEDURE — 80048 BASIC METABOLIC PNL TOTAL CA: CPT | Performed by: NURSE PRACTITIONER

## 2018-10-29 PROCEDURE — 93010 ELECTROCARDIOGRAM REPORT: CPT | Performed by: INTERNAL MEDICINE

## 2018-10-29 PROCEDURE — 85610 PROTHROMBIN TIME: CPT | Performed by: NURSE PRACTITIONER

## 2018-10-29 PROCEDURE — 85730 THROMBOPLASTIN TIME PARTIAL: CPT | Performed by: NURSE PRACTITIONER

## 2018-10-29 RX ORDER — BUPROPION HYDROCHLORIDE 75 MG/1
75 TABLET ORAL DAILY PRN
COMMUNITY
End: 2019-10-15

## 2018-11-02 ENCOUNTER — TELEPHONE (OUTPATIENT)
Dept: VASCULAR SURGERY | Facility: CLINIC | Age: 73
End: 2018-11-02

## 2018-11-02 NOTE — TELEPHONE ENCOUNTER
Left message with courtesy reminder of arrival time for procedure on 11/5/2018 at 5:15 am. Left all contact information if patient had questions and needed to contact me back

## 2018-11-05 ENCOUNTER — ANESTHESIA EVENT (OUTPATIENT)
Dept: PERIOP | Facility: HOSPITAL | Age: 73
End: 2018-11-05

## 2018-11-05 ENCOUNTER — HOSPITAL ENCOUNTER (OUTPATIENT)
Facility: HOSPITAL | Age: 73
Setting detail: HOSPITAL OUTPATIENT SURGERY
Discharge: HOME | End: 2018-11-05
Attending: SURGERY | Admitting: SURGERY

## 2018-11-05 ENCOUNTER — ANESTHESIA (OUTPATIENT)
Dept: PERIOP | Facility: HOSPITAL | Age: 73
End: 2018-11-05

## 2018-11-05 ENCOUNTER — APPOINTMENT (OUTPATIENT)
Dept: ULTRASOUND IMAGING | Facility: HOSPITAL | Age: 73
End: 2018-11-05

## 2018-11-05 VITALS
SYSTOLIC BLOOD PRESSURE: 117 MMHG | RESPIRATION RATE: 18 BRPM | OXYGEN SATURATION: 95 % | HEART RATE: 68 BPM | DIASTOLIC BLOOD PRESSURE: 59 MMHG | TEMPERATURE: 97.1 F

## 2018-11-05 DIAGNOSIS — I87.393 CHRONIC VENOUS HYPERTENSION (IDIOPATHIC) WITH OTHER COMPLICATIONS OF BILATERAL LOWER EXTREMITY: ICD-10-CM

## 2018-11-05 DIAGNOSIS — Z01.818 PREOP TESTING: ICD-10-CM

## 2018-11-05 DIAGNOSIS — I87.2 VENOUS INSUFFICIENCY: ICD-10-CM

## 2018-11-05 LAB
ABO GROUP BLD: NORMAL
BLD GP AB SCN SERPL QL: NEGATIVE
RH BLD: POSITIVE
T&S EXPIRATION DATE: NORMAL

## 2018-11-05 PROCEDURE — C1888 ENDOVAS NON-CARDIAC ABL CATH: HCPCS | Performed by: SURGERY

## 2018-11-05 PROCEDURE — 76937 US GUIDE VASCULAR ACCESS: CPT

## 2018-11-05 PROCEDURE — 25010000002 MIDAZOLAM PER 1 MG: Performed by: ANESTHESIOLOGY

## 2018-11-05 PROCEDURE — 86901 BLOOD TYPING SEROLOGIC RH(D): CPT | Performed by: NURSE PRACTITIONER

## 2018-11-05 PROCEDURE — 25010000002 ONDANSETRON PER 1 MG: Performed by: NURSE ANESTHETIST, CERTIFIED REGISTERED

## 2018-11-05 PROCEDURE — 25010000002 PROPOFOL 10 MG/ML EMULSION: Performed by: NURSE ANESTHETIST, CERTIFIED REGISTERED

## 2018-11-05 PROCEDURE — 86850 RBC ANTIBODY SCREEN: CPT | Performed by: NURSE PRACTITIONER

## 2018-11-05 PROCEDURE — 94799 UNLISTED PULMONARY SVC/PX: CPT

## 2018-11-05 PROCEDURE — 36475 ENDOVENOUS RF 1ST VEIN: CPT | Performed by: SURGERY

## 2018-11-05 PROCEDURE — 25010000002 FENTANYL CITRATE (PF) 100 MCG/2ML SOLUTION: Performed by: NURSE ANESTHETIST, CERTIFIED REGISTERED

## 2018-11-05 PROCEDURE — C1894 INTRO/SHEATH, NON-LASER: HCPCS | Performed by: SURGERY

## 2018-11-05 PROCEDURE — 86900 BLOOD TYPING SEROLOGIC ABO: CPT | Performed by: NURSE PRACTITIONER

## 2018-11-05 RX ORDER — SODIUM CHLORIDE, SODIUM LACTATE, POTASSIUM CHLORIDE, CALCIUM CHLORIDE 600; 310; 30; 20 MG/100ML; MG/100ML; MG/100ML; MG/100ML
100 INJECTION, SOLUTION INTRAVENOUS CONTINUOUS
Status: DISCONTINUED | OUTPATIENT
Start: 2018-11-05 | End: 2018-11-05 | Stop reason: HOSPADM

## 2018-11-05 RX ORDER — SODIUM CHLORIDE, SODIUM LACTATE, POTASSIUM CHLORIDE, CALCIUM CHLORIDE 600; 310; 30; 20 MG/100ML; MG/100ML; MG/100ML; MG/100ML
1000 INJECTION, SOLUTION INTRAVENOUS CONTINUOUS
Status: DISCONTINUED | OUTPATIENT
Start: 2018-11-05 | End: 2018-11-05 | Stop reason: HOSPADM

## 2018-11-05 RX ORDER — ONDANSETRON 2 MG/ML
4 INJECTION INTRAMUSCULAR; INTRAVENOUS ONCE AS NEEDED
Status: DISCONTINUED | OUTPATIENT
Start: 2018-11-05 | End: 2018-11-05 | Stop reason: HOSPADM

## 2018-11-05 RX ORDER — MIDAZOLAM HYDROCHLORIDE 1 MG/ML
1 INJECTION INTRAMUSCULAR; INTRAVENOUS
Status: DISCONTINUED | OUTPATIENT
Start: 2018-11-05 | End: 2018-11-05 | Stop reason: HOSPADM

## 2018-11-05 RX ORDER — NALOXONE HCL 0.4 MG/ML
0.4 VIAL (ML) INJECTION AS NEEDED
Status: DISCONTINUED | OUTPATIENT
Start: 2018-11-05 | End: 2018-11-05 | Stop reason: HOSPADM

## 2018-11-05 RX ORDER — METOCLOPRAMIDE HYDROCHLORIDE 5 MG/ML
5 INJECTION INTRAMUSCULAR; INTRAVENOUS
Status: DISCONTINUED | OUTPATIENT
Start: 2018-11-05 | End: 2018-11-05 | Stop reason: HOSPADM

## 2018-11-05 RX ORDER — FENTANYL CITRATE 50 UG/ML
25 INJECTION, SOLUTION INTRAMUSCULAR; INTRAVENOUS AS NEEDED
Status: DISCONTINUED | OUTPATIENT
Start: 2018-11-05 | End: 2018-11-05 | Stop reason: HOSPADM

## 2018-11-05 RX ORDER — LIDOCAINE HYDROCHLORIDE 20 MG/ML
INJECTION, SOLUTION INFILTRATION; PERINEURAL AS NEEDED
Status: DISCONTINUED | OUTPATIENT
Start: 2018-11-05 | End: 2018-11-05 | Stop reason: SURG

## 2018-11-05 RX ORDER — MIDAZOLAM HYDROCHLORIDE 1 MG/ML
2 INJECTION INTRAMUSCULAR; INTRAVENOUS
Status: DISCONTINUED | OUTPATIENT
Start: 2018-11-05 | End: 2018-11-05 | Stop reason: HOSPADM

## 2018-11-05 RX ORDER — SODIUM CHLORIDE 9 MG/ML
INJECTION, SOLUTION INTRAVENOUS AS NEEDED
Status: DISCONTINUED | OUTPATIENT
Start: 2018-11-05 | End: 2018-11-05 | Stop reason: HOSPADM

## 2018-11-05 RX ORDER — BUPIVACAINE HCL/0.9 % NACL/PF 0.1 %
2 PLASTIC BAG, INJECTION (ML) EPIDURAL ONCE
Status: COMPLETED | OUTPATIENT
Start: 2018-11-05 | End: 2018-11-05

## 2018-11-05 RX ORDER — ACETAMINOPHEN 500 MG
1000 TABLET ORAL ONCE
Status: COMPLETED | OUTPATIENT
Start: 2018-11-05 | End: 2018-11-05

## 2018-11-05 RX ORDER — FENTANYL CITRATE 50 UG/ML
INJECTION, SOLUTION INTRAMUSCULAR; INTRAVENOUS AS NEEDED
Status: DISCONTINUED | OUTPATIENT
Start: 2018-11-05 | End: 2018-11-05 | Stop reason: SURG

## 2018-11-05 RX ORDER — PROPOFOL 10 MG/ML
VIAL (ML) INTRAVENOUS AS NEEDED
Status: DISCONTINUED | OUTPATIENT
Start: 2018-11-05 | End: 2018-11-05 | Stop reason: SURG

## 2018-11-05 RX ORDER — SODIUM CHLORIDE 0.9 % (FLUSH) 0.9 %
3 SYRINGE (ML) INJECTION AS NEEDED
Status: DISCONTINUED | OUTPATIENT
Start: 2018-11-05 | End: 2018-11-05 | Stop reason: HOSPADM

## 2018-11-05 RX ORDER — IBUPROFEN 600 MG/1
600 TABLET ORAL ONCE AS NEEDED
Status: DISCONTINUED | OUTPATIENT
Start: 2018-11-05 | End: 2018-11-05 | Stop reason: HOSPADM

## 2018-11-05 RX ORDER — MEPERIDINE HYDROCHLORIDE 25 MG/ML
12.5 INJECTION INTRAMUSCULAR; INTRAVENOUS; SUBCUTANEOUS
Status: DISCONTINUED | OUTPATIENT
Start: 2018-11-05 | End: 2018-11-05 | Stop reason: HOSPADM

## 2018-11-05 RX ORDER — ONDANSETRON 2 MG/ML
INJECTION INTRAMUSCULAR; INTRAVENOUS AS NEEDED
Status: DISCONTINUED | OUTPATIENT
Start: 2018-11-05 | End: 2018-11-05 | Stop reason: SURG

## 2018-11-05 RX ORDER — OXYCODONE AND ACETAMINOPHEN 7.5; 325 MG/1; MG/1
2 TABLET ORAL ONCE AS NEEDED
Status: DISCONTINUED | OUTPATIENT
Start: 2018-11-05 | End: 2018-11-05 | Stop reason: HOSPADM

## 2018-11-05 RX ORDER — OXYCODONE AND ACETAMINOPHEN 10; 325 MG/1; MG/1
1 TABLET ORAL ONCE AS NEEDED
Status: DISCONTINUED | OUTPATIENT
Start: 2018-11-05 | End: 2018-11-05 | Stop reason: HOSPADM

## 2018-11-05 RX ORDER — SODIUM CHLORIDE 0.9 % (FLUSH) 0.9 %
1-10 SYRINGE (ML) INJECTION AS NEEDED
Status: DISCONTINUED | OUTPATIENT
Start: 2018-11-05 | End: 2018-11-05 | Stop reason: HOSPADM

## 2018-11-05 RX ORDER — HYDROCODONE BITARTRATE AND ACETAMINOPHEN 5; 325 MG/1; MG/1
1-2 TABLET ORAL EVERY 6 HOURS PRN
Qty: 20 TABLET | Refills: 0 | Status: SHIPPED | OUTPATIENT
Start: 2018-11-05 | End: 2018-11-13

## 2018-11-05 RX ORDER — SODIUM CHLORIDE 0.9 % (FLUSH) 0.9 %
3 SYRINGE (ML) INJECTION EVERY 12 HOURS SCHEDULED
Status: DISCONTINUED | OUTPATIENT
Start: 2018-11-05 | End: 2018-11-05 | Stop reason: HOSPADM

## 2018-11-05 RX ORDER — LABETALOL HYDROCHLORIDE 5 MG/ML
5 INJECTION, SOLUTION INTRAVENOUS
Status: DISCONTINUED | OUTPATIENT
Start: 2018-11-05 | End: 2018-11-05 | Stop reason: HOSPADM

## 2018-11-05 RX ORDER — HYDROCODONE BITARTRATE AND ACETAMINOPHEN 5; 325 MG/1; MG/1
1 TABLET ORAL ONCE AS NEEDED
Status: DISCONTINUED | OUTPATIENT
Start: 2018-11-05 | End: 2018-11-05 | Stop reason: HOSPADM

## 2018-11-05 RX ORDER — IPRATROPIUM BROMIDE AND ALBUTEROL SULFATE 2.5; .5 MG/3ML; MG/3ML
3 SOLUTION RESPIRATORY (INHALATION) ONCE AS NEEDED
Status: DISCONTINUED | OUTPATIENT
Start: 2018-11-05 | End: 2018-11-05 | Stop reason: HOSPADM

## 2018-11-05 RX ADMIN — MIDAZOLAM HYDROCHLORIDE 2 MG: 1 INJECTION, SOLUTION INTRAMUSCULAR; INTRAVENOUS at 06:40

## 2018-11-05 RX ADMIN — SODIUM CHLORIDE, POTASSIUM CHLORIDE, SODIUM LACTATE AND CALCIUM CHLORIDE 1000 ML: 600; 310; 30; 20 INJECTION, SOLUTION INTRAVENOUS at 05:57

## 2018-11-05 RX ADMIN — LIDOCAINE HYDROCHLORIDE 0.5 ML: 10 INJECTION, SOLUTION EPIDURAL; INFILTRATION; INTRACAUDAL; PERINEURAL at 05:57

## 2018-11-05 RX ADMIN — FENTANYL CITRATE 25 MCG: 50 INJECTION, SOLUTION INTRAMUSCULAR; INTRAVENOUS at 08:20

## 2018-11-05 RX ADMIN — ONDANSETRON HYDROCHLORIDE 4 MG: 2 SOLUTION INTRAMUSCULAR; INTRAVENOUS at 08:45

## 2018-11-05 RX ADMIN — Medication 2 G: at 08:02

## 2018-11-05 RX ADMIN — PROPOFOL 200 MG: 10 INJECTION, EMULSION INTRAVENOUS at 08:26

## 2018-11-05 RX ADMIN — FENTANYL CITRATE 25 MCG: 50 INJECTION, SOLUTION INTRAMUSCULAR; INTRAVENOUS at 08:07

## 2018-11-05 RX ADMIN — FENTANYL CITRATE 50 MCG: 50 INJECTION, SOLUTION INTRAMUSCULAR; INTRAVENOUS at 08:00

## 2018-11-05 RX ADMIN — LIDOCAINE HYDROCHLORIDE 40 MG: 20 INJECTION, SOLUTION INFILTRATION; PERINEURAL at 08:00

## 2018-11-05 RX ADMIN — ACETAMINOPHEN 1000 MG: 500 TABLET, FILM COATED ORAL at 06:40

## 2018-11-05 RX ADMIN — PROPOFOL 200 MG: 10 INJECTION, EMULSION INTRAVENOUS at 08:00

## 2018-11-05 NOTE — H&P
Laura Conner  [id number]  [id number]  [facility] OR/NONE  Maco Bentley DO  11/5/2018      HPI: I had the pleasure of seeing your patient Laura Conner in the office today.    As you recall, Laura Conner is a 73 y.o.  female who you are currently following for routine health maintenance.  She has had complaints of leg swelling since about February per recall.  In further discussion regarding her medication review, she was started on Norvasc.  This was recently discontinued and leg swelling improved.  However, her blood pressure elevated and was started back on a lower dose along with HCTZ.  S She does have some spider varicosities and reports cramping at night when she sleeps.  She reports heaviness and tiredness.  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  She did have noninasive testing performed, which I did review in office.     Past Medical History:   Diagnosis Date   • Breast cancer (CMS/HCC) 1984    left breast cancer   • Gout    • H/O acute renal failure     due to antibiotic   • Hypercholesteremia    • Hypertension    • Hypertriglyceridemia    • Hypothyroid    • Kidney cyst, acquired    • Leg edema    • Osteoarthritis    • Venous insufficiency        Past Surgical History:   Procedure Laterality Date   • AUGMENTATION MAMMAPLASTY     • BLADDER SUSPENSION     • BREAST BIOPSY     • BREAST IMPLANT SURGERY     • CATARACT EXTRACTION, BILATERAL     • FOOT SURGERY     • HYSTERECTOMY     • MASTECTOMY Bilateral 1984    lymph nodes removed on left   • OOPHORECTOMY     • THYROIDECTOMY, PARTIAL     • TONSILLECTOMY     • TOTAL KNEE ARTHROPLASTY Left        Family History   Problem Relation Age of Onset   • Breast cancer Sister    • Bone cancer Sister    • Stroke Sister    • Heart failure Mother    • Diabetes Mother    • Arthritis Mother    • Heart disease Father    • Stroke Father    • Prostate cancer Father    • Aneurysm Father    • Lung  cancer Brother    • Diabetes Brother    • Heart disease Sister    • Parkinsonism Sister    • Stroke Paternal Grandmother        Social History     Social History   • Marital status:      Spouse name: N/A   • Number of children: N/A   • Years of education: N/A     Occupational History   • Not on file.     Social History Main Topics   • Smoking status: Never Smoker   • Smokeless tobacco: Never Used   • Alcohol use No   • Drug use: Unknown   • Sexual activity: Defer     Other Topics Concern   • Not on file     Social History Narrative   • No narrative on file       Allergies   Allergen Reactions   • Ciprofloxacin Other (See Comments)     Caused patient to go into kidney failure   • Morphine And Related Anxiety     Patient states that she couldn't sit still       Prescriptions Prior to Admission   Medication Sig Dispense Refill Last Dose   • amLODIPine (NORVASC) 2.5 MG tablet Take 2.5 mg by mouth Daily.   11/5/2018 at 0400   • aspirin 81 MG EC tablet Take 81 mg by mouth Daily.   Past Week at Unknown time   • buPROPion (WELLBUTRIN) 75 MG tablet Take 75 mg by mouth Daily As Needed (for stress).   11/4/2018 at 0800   • HYDROCHLOROTHIAZIDE PO Take 2.5 mg by mouth Daily.   11/4/2018 at 0800   • irbesartan (AVAPRO) 300 MG tablet Take 300 mg by mouth Every Night.   11/4/2018 at 0800   • levothyroxine (SYNTHROID, LEVOTHROID) 100 MCG tablet Take 100 mcg by mouth Daily.   11/4/2018 at 0800   • Magnesium Hydroxide (MAGNESIA PO) Take 1 tablet by mouth Daily.   Past Week at Unknown time   • meloxicam (MOBIC) 15 MG tablet Take 15 mg by mouth Daily.   11/4/2018 at 0800   • simvastatin (ZOCOR) 20 MG tablet Take 20 mg by mouth Every Night.   11/4/2018 at 1900       Review of Systems  Constitutional: Negative.    HENT: Negative.    Eyes: Negative.    Respiratory: Negative.    Cardiovascular: Positive for leg swelling (with cramping to BLE).   Gastrointestinal: Negative.    Endocrine: Negative.    Genitourinary: Negative.     Musculoskeletal: Positive for neck pain (intermittent muscle spasm right posterior neck).   Skin: Negative.    Allergic/Immunologic: Negative.    Neurological: Negative.    Hematological: Negative.    Psychiatric/Behavioral: The patient is nervous/anxious.    /79 (BP Location: Right arm, Patient Position: Sitting)   Pulse 68   Temp 97.4 °F (36.3 °C) (Temporal Artery )   Resp 16   SpO2 98%   Physical Exam  Constitutional: She is oriented to person, place, and time. She appears well-developed and well-nourished. No distress.   HENT:   Head: Normocephalic and atraumatic.   Mouth/Throat: Oropharynx is clear and moist.   Eyes: Pupils are equal, round, and reactive to light. No scleral icterus.   Neck: Normal range of motion. Neck supple. No JVD present. Carotid bruit is not present. No thyromegaly present.   Cardiovascular: Normal rate, regular rhythm, S2 normal, normal heart sounds, intact distal pulses and normal pulses.  Exam reveals no gallop and no friction rub.    No murmur heard.  Pulmonary/Chest: Effort normal and breath sounds normal.   Abdominal: Soft. Normal aorta and bowel sounds are normal. There is no hepatosplenomegaly.   Musculoskeletal: Normal range of motion. She exhibits edema (to bilateral lower extremities).   Neurological: She is alert and oriented to person, place, and time. No cranial nerve deficit.     Skin: Skin is warm and dry. She is not diaphoretic.   Psychiatric: She has a normal mood and affect. Her behavior is normal. Judgment and thought content normal.   Nursing note and vitals reviewed.      Impression:  1. Venous insufficiency       Plan:  After thoroughly evaluating Laura Conner, I believe the best course of action is to proceed with a radiofrequency ablation of the left lower extremity greater saphenous vein, followed by the right.   Risks of radiofrequency ablation include, but are not limited to, bleeding, infection, vessel damage, nerve damage, DVT, phlebitis, and  pulmonary embolus.  The patient understands these risks and wishes to proceed with procedure.   I would like her to continue wearing compression stockings in the 20-30 mm pressure gradient range.  I did instruct the patient on how to wear these on a daily basis.  As far as her carotid disease, we will recheck this in 1 year.   Body mass index is 31.76 kg/m². BMI chart given.  The patient can continue taking her current medication regimen as previously planned.  This was all discussed in full with complete understanding.    Maco Bentley, DO

## 2018-11-05 NOTE — ANESTHESIA POSTPROCEDURE EVALUATION
Patient: Laura Conner    Procedure Summary     Date:  11/05/18 Room / Location:  [facility] OR  /  [facility] HYBRID OR 12    Anesthesia Start:  0756 Anesthesia Stop:  0902    Procedure:  LEFT SAPHENOUS VEIN RADIO FREQUENCY ABLATION (Left Leg Lower) Diagnosis:       Venous insufficiency      Preop testing      (Venous insufficiency [I87.2])      (Preop testing [Z01.818])    Surgeon:  Maco Bentley DO Provider:  Yuki Crabtree CRNA    Anesthesia Type:  general ASA Status:  2          Anesthesia Type: general  Last vitals  BP   130/57 (11/05/18 0925)   Temp   97.1 °F (36.2 °C) (11/05/18 0900)   Pulse   59 (11/05/18 0925)   Resp   18 (11/05/18 0925)     SpO2   96 % (11/05/18 0925)     Post Anesthesia Care and Evaluation    PONV Status: none  Comments: Patient d/c from PACU prior to anes eval based on Jumana score.  Please see RN notes for details of d/c criteria.    Blood pressure 130/57, pulse 59, temperature 97.1 °F (36.2 °C), temperature source Temporal Artery , resp. rate 18, SpO2 96 %, not currently breastfeeding.

## 2018-11-05 NOTE — ANESTHESIA PREPROCEDURE EVALUATION
Anesthesia Evaluation     Patient summary reviewed and Nursing notes reviewed   no history of anesthetic complications:  NPO Solid Status: > 8 hours  NPO Liquid Status: > 8 hours           Airway   Mallampati: I  TM distance: >3 FB  Neck ROM: full  No difficulty expected  Dental      Pulmonary     breath sounds clear to auscultation  Cardiovascular   Exercise tolerance: good (4-7 METS)    ECG reviewed  Rhythm: regular  Rate: normal    (+) hypertension, hyperlipidemia,   (-) CAD, cardiac stents      Neuro/Psych  (-) seizures, TIA, CVA  GI/Hepatic/Renal/Endo    (+)  GERD,  hypothyroidism,   (-) liver disease, no renal disease, diabetes    Musculoskeletal     Abdominal    Substance History      OB/GYN          Other   (+) arthritis   history of cancer (breast cancer, s/p double mastectomy) remission                    Anesthesia Plan    ASA 2     general     intravenous induction   Anesthetic plan, all risks, benefits, and alternatives have been provided, discussed and informed consent has been obtained with: patient.

## 2018-11-05 NOTE — OP NOTE
Laura Conner  11/5/2018     PREOPERATIVE DIAGNOSIS: Venous insufficiency [I87.2]  Preop testing [Z01.818]     POSTOPERATIVE DIAGNOSIS: Post-Op Diagnosis Codes:     * Venous insufficiency [I87.2]     * Preop testing [Z01.818]     PROCEDURE PERFORMED:   1.  Ultrasound-guided cannulation of the left lower extremity greater saphenous vein  2.  Radiofrequency ablation of the left lower extremity greater saphenous vein     SURGEON: Maco Bentley DO      ANESTHESIA: Mac    PREPARATION: Routine.    STAFF: Circulator: Sari Shah RN  Scrub Person: Chacha Patricia  Assistant: Charlie Dunlap  Vascular Ultrasound Technician: James Victor    ESTIMATED BLOOD LOSS: 5 ML    SPECIMENS: None    COMPLICATIONS: None    INDICATIONS: Laura Conner is a 73 y.o. female who has had complaints of leg swelling since about February per recall.  In further discussion regarding her medication review, she was started on Norvasc.  This was recently discontinued and leg swelling improved.  However, her blood pressure elevated and was started back on a lower dose along with HCTZ.  S She does have some spider varicosities and reports cramping at night when she sleeps.  She reports heaviness and tiredness.  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  She did have noninasive testing performed, which I did review in office.  The indications, risks, and possible complications of the procedure were explained to the patient, who voiced understanding and wished to proceed with surgery.     PROCEDURE IN DETAIL:   The patient was taken to the operating room and placed on the operating table in a supine position. After Mac anesthesia was obtained, the left lower extremity was prepped and draped in a sterile manner.  Under ultrasound guidance and using a micropuncture technique the left lower extremity greater saphenous vein was cannulated and a microwire was  placed.  This was confirmed under ultrasound.  A small stab incision was made with 11 blade and a 7 Pashto sheath was placed.  The patient was placed in Trendelenburg position and the saphenofemoral junction was identified under ultrasound.  The catheter was placed up to the junction and pulled back 3 cm and marked.  Next, tumescent fluid was instilled along the entire length of the vein under ultrasound guidance.  Once sufficient tumescent fluid was placed radio frequency ablation had commenced.  There was a total of 6 RF cycles for a total treatment length of 33 cm for a total treatment time of 2 minutes.  There was an average of 16 W at an average temperature 120°C.  Upon completion of the ablation the catheter and sheath were removed.  Direct pressure was held for an additional 5-10 minutes of ensure hemostasis.  An Ace wrap was placed from the toes to the groin.  Sterile dressings were applied. The patient tolerated the procedure well. Sponge and needle counts were correct. The patient was then awakened and transferred to the outpatient center in stable condition.  Maco Bentley,   Date: 11/5/2018 Time: 8:53 AM     CC:Mich Cedillo MD

## 2018-11-05 NOTE — DISCHARGE INSTRUCTIONS

## 2018-11-12 ENCOUNTER — TELEPHONE (OUTPATIENT)
Dept: VASCULAR SURGERY | Facility: CLINIC | Age: 73
End: 2018-11-12

## 2018-11-13 ENCOUNTER — HOSPITAL ENCOUNTER (OUTPATIENT)
Dept: ULTRASOUND IMAGING | Facility: HOSPITAL | Age: 73
Discharge: HOME | End: 2018-11-13
Attending: SURGERY | Admitting: SURGERY

## 2018-11-13 ENCOUNTER — OFFICE VISIT (OUTPATIENT)
Dept: VASCULAR SURGERY | Facility: CLINIC | Age: 73
End: 2018-11-13

## 2018-11-13 ENCOUNTER — TELEPHONE (OUTPATIENT)
Dept: VASCULAR SURGERY | Facility: CLINIC | Age: 73
End: 2018-11-13

## 2018-11-13 VITALS
OXYGEN SATURATION: 98 % | WEIGHT: 185 LBS | DIASTOLIC BLOOD PRESSURE: 74 MMHG | SYSTOLIC BLOOD PRESSURE: 118 MMHG | BODY MASS INDEX: 31.58 KG/M2 | HEART RATE: 78 BPM | HEIGHT: 64 IN

## 2018-11-13 DIAGNOSIS — I87.2 VENOUS INSUFFICIENCY: ICD-10-CM

## 2018-11-13 DIAGNOSIS — Z01.818 PREOP TESTING: ICD-10-CM

## 2018-11-13 DIAGNOSIS — I10 ESSENTIAL HYPERTENSION: ICD-10-CM

## 2018-11-13 DIAGNOSIS — I87.2 VENOUS (PERIPHERAL) INSUFFICIENCY: Primary | ICD-10-CM

## 2018-11-13 DIAGNOSIS — I87.393 CHRONIC VENOUS HYPERTENSION (IDIOPATHIC) WITH OTHER COMPLICATIONS OF BILATERAL LOWER EXTREMITY: ICD-10-CM

## 2018-11-13 DIAGNOSIS — E78.5 HYPERLIPIDEMIA, UNSPECIFIED HYPERLIPIDEMIA TYPE: ICD-10-CM

## 2018-11-13 PROCEDURE — 93971 EXTREMITY STUDY: CPT

## 2018-11-13 PROCEDURE — 99213 OFFICE O/P EST LOW 20 MIN: CPT | Performed by: NURSE PRACTITIONER

## 2018-11-13 PROCEDURE — 93971 EXTREMITY STUDY: CPT | Performed by: SURGERY

## 2018-11-13 NOTE — TELEPHONE ENCOUNTER
Spoke with patient and advised of upcomign procedure.  Patient is scheduled for procedure on 11/19/2018 with an arrival time of 8 am.  Patient advised of time, location and prep.  Patient will not need to have pre work per RADHIKA Vee due to previously having it done recently.  Patient expressed understanding for all that was discussed.

## 2018-11-13 NOTE — PROGRESS NOTES
"11/13/2018       Mich Cedillo MD  [address]  [facility] [address]    Laura Conner  1945    Chief Complaint   Patient presents with   • Post-op     1 wk po f/u with testing       Dear Mich Cedillo MD       HPI  I had the pleasure of seeing your patient Laura Conner in the office today.    As you recall, Laura Conner is a 73 y.o.  female who you are currently following for routine health maintenance.  She has had complaints of leg swelling since about February per recall.  In further discussion regarding her medication review, she was started on Norvasc.  This was recently discontinued and leg swelling improved.  However, her blood pressure elevated and was started back on a lower dose along with HCTZ.  S She does have some spider varicosities and reports cramping at night when she sleeps.  She reports heaviness and tiredness.  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  She did undergo a left lower extremity radiofrequency ablation on 11/5/18 and is now back for follow up.  She does states her swelling has significantly improved. She did have noninasive testing performed, which I did review in office.         Review of Systems   Constitutional: Negative.    HENT: Negative.    Eyes: Negative.    Respiratory: Negative.    Cardiovascular: Positive for leg swelling (with cramping to BLE).   Gastrointestinal: Negative.    Endocrine: Negative.    Genitourinary: Negative.    Musculoskeletal: Positive for neck pain (intermittent muscle spasm right posterior neck).   Skin: Negative.    Allergic/Immunologic: Negative.    Neurological: Negative.    Hematological: Negative.    Psychiatric/Behavioral: The patient is nervous/anxious.        /74 (BP Location: Left arm, Patient Position: Sitting, Cuff Size: Adult)   Pulse 78   Ht 162.6 cm (64\")   Wt 83.9 kg (185 lb)   SpO2 98%   BMI 31.76 kg/m²   Physical Exam "   Constitutional: She is oriented to person, place, and time. She appears well-developed and well-nourished. No distress.   HENT:   Head: Normocephalic and atraumatic.   Mouth/Throat: Oropharynx is clear and moist.   Eyes: Pupils are equal, round, and reactive to light. No scleral icterus.   Neck: Normal range of motion. Neck supple. No JVD present. Carotid bruit is not present. No thyromegaly present.   Cardiovascular: Normal rate, regular rhythm, S2 normal, normal heart sounds, intact distal pulses and normal pulses. Exam reveals no gallop and no friction rub.   No murmur heard.  Pulmonary/Chest: Effort normal and breath sounds normal.   Abdominal: Soft. Normal aorta and bowel sounds are normal. There is no hepatosplenomegaly.   Musculoskeletal: Normal range of motion. She exhibits edema (to bilateral lower extremities).   Neurological: She is alert and oriented to person, place, and time. No cranial nerve deficit.   Skin: Skin is warm and dry. She is not diaphoretic.   Psychiatric: She has a normal mood and affect. Her behavior is normal. Judgment and thought content normal.   Nursing note and vitals reviewed.    Diagnostic Data:  Left lower extremity venous duplex shows no evidence of DVT and greater saphenous vein closed as expected post RFA.       Xr Chest 2 View    Result Date: 10/29/2018  Narrative: XR CHEST 2 VW-  HISTORY: htn; I87.2-Venous insufficiency (chronic) (peripheral); Z01.818-Encounter for other preprocedural examination; I10-Essential (primary) hypertension   COMPARISON: 4/6/2015.  FINDINGS: Upright frontal and lateral radiographs of the chest were obtained.  Hyperexpanded lung volumes with flattening of the hemidiaphragms. Underlying fibrotic changes in the interstitium. No new consolidation. No pleural effusion or pneumothorax. The cardiomediastinal silhouette and pulmonary vascularity are within normal limits. The osseous structures and surrounding soft tissues demonstrate no acute  abnormality.      Impression: 1. No acute process in the chest. 2. Suspect underlying emphysema with interstitial fibrosis.   This report was finalized on 10/29/2018 13:17 by Dr Chilo Morales, .    Us Guided Vascular Access    Result Date: 11/7/2018  Narrative: History: Intraoperative venous duplex was performed at the time of greater saphenous vein radiofrequency ablation.  Comment: Limited venous duplex was performed using gray scale imaging and color flow Doppler.  FINDINGS: The greater saphenous vein was successfully cannulated under ultrasound guidance and radiofrequency ablation catheter advanced to the saphenofemoral junction and pulled back 3 cm and marked.      Impression: Successful cannulation and radiofrequency ablation catheter placement at the time of her saphenous vein radiofrequency ablation. This report was finalized on 11/07/2018 16:29 by Dr. Andrew Steele MD.    Us Carotid Bilateral    Result Date: 10/17/2018  Narrative: History: Carotid occlusive disease      Impression: Impression: 1. There is less than 50% stenosis of the right internal carotid artery. 2. There is 50-69% stenosis of the left internal carotid artery. 3. Antegrade flow is demonstrated in bilateral vertebral arteries.  Comments: Bilateral carotid vertebral arterial duplex scan was performed.  Grayscale imaging shows intimal thickening and calcified elements at the carotid bifurcation. The right internal carotid artery peak systolic velocity is 57 cm/sec. The end-diastolic velocity is 23.2 cm/sec. The right ICA/CCA ratio is approximately 0.78 . These findings correlate with less than 50% stenosis of the right internal carotid artery.  Grayscale imaging shows intimal thickening and calcified elements at the carotid bifurcation. The left internal carotid artery peak systolic velocity is 141 cm/sec. The end-diastolic velocity is 46.2 cm/sec. The left ICA/CCA ratio is approximately 1.87 . These findings correlate with 50-69% stenosis  of the left internal carotid artery.  Antegrade flow is demonstrated in bilateral vertebral arteries.    This report was finalized on [id number] by Dr. Maco Bentley MD.    Us Venous Doppler Lower Extremity Left (duplex)    Result Date: 11/13/2018  Narrative: History: Patient is status post left lower extremity greater saphenous vein radiofrequency ablation on 11/5/2018.      Impression: Impression: There is no evidence of deep venous thrombosis or superficial thrombophlebitis of the left lower extremity. The left greater saphenous vein appears successfully closed from 1.9 cm distal to the saphenofemoral junction  Comments: Left lower extremity venous duplex exam was performed using color Doppler flow, Doppler wave form analysis, and grayscale imaging, with and without compression. There is no evidence of deep venous thrombosis of the common femoral, superficial femoral, popliteal, posterior tibial, and peroneal veins. The left greater saphenous vein appears successfully closed from 1.9 cm distal to the saphenofemoral junction.  This report was finalized on 11/13/2018 13:02 by Dr. Andrew Steele MD.    Us Venous Doppler Lower Extremity Bilateral (duplex)    Result Date: 10/17/2018  Narrative: History: Swelling   Comments: Venous valvular insufficiency testing was performed in both legs using duplex ultrasound with rapid cuff deflation technique.  The common femoral veins, popliteal veins, posterior tibial veins, greater saphenous veins, and lesser saphenous veins were interrogated bilaterally.  On the right, the greater saphenous vein at the junction measured 6.7 mm. In the mid thigh measured 2.2 mm. Above the knee measured 3.1 mm. In the mid calf measured 2.3 mm. At the ankle measured 2.3 mm. There is greater than 0.5 seconds of reflux at the junction and from the mid calf to the ankle. The lesser saphenous vein is within normal limits and no evidence of reflux. There is no evidence of DVT.  On the left, the  greater saphenous vein at the junction measured 6.6 mm. In the mid thigh measured 2.8 mm. Above the knee measured 2.9 mm. In the mid calf measured 2 mm. At the ankle measured 2.9 mm. There is greater than 0.5 seconds of reflux above the knee. The lesser saphenous vein is within normal limits and no evidence of reflux. There is no evidence of DVT.       Impression: Impression: There is evidence of venous insufficiency in both lower extremity greater saphenous veins right greater than left.     This report was finalized on 10/17/2018 13:16 by Dr. Maco Bentley MD.      Patient Active Problem List   Diagnosis   • Diverticulitis   • Diverticulosis   • Essential hypertension   • Gastroesophageal reflux disease   • History of colon polyps   • Hyperlipidemia   • Hypothyroidism   • Insomnia   • Irritable bowel syndrome with diarrhea   • Primary osteoarthritis of left knee   • BMI 33.0-33.9,adult   • Venous insufficiency   • Preop testing         ICD-10-CM ICD-9-CM   1. Venous (peripheral) insufficiency I87.2 459.81   2. Preop testing Z01.818 V72.84   3. Essential hypertension I10 401.9   4. Hyperlipidemia, unspecified hyperlipidemia type E78.5 272.4       Plan: After thoroughly evaluating Laura Conner, I am pleased she is doing well status radiofrequency ablation of the left lower extremity.  Her swelling has improved and leg feels great.  She would like to proceed with a radiofrequency ablation of the right lower extremity greater saphenous vein, followed by the right.   Risks of radiofrequency ablation include, but are not limited to, bleeding, infection, vessel damage, nerve damage, DVT, phlebitis, and pulmonary embolus.  The patient understands these risks and wishes to proceed with procedure.   I would like her to continue wearing compression stockings in the 20-30 mm pressure gradient range.  I did instruct the patient on how to wear these on a daily basis.  As far as her carotid disease, we will recheck this in  1 year.   Body mass index is 31.76 kg/m². BMI chart given.  The patient can continue taking her current medication regimen as previously planned.  This was all discussed in full with complete understanding.    Thank you for allowing me to participate in the care of your patient.  Please do not hesitate with any questions or concerns.  I will keep you aware of any further encounters with Laura Conner.        Sincerely yours,         RADHIKA Bryan Danny Neale, MD

## 2018-11-15 ENCOUNTER — TELEPHONE (OUTPATIENT)
Dept: VASCULAR SURGERY | Facility: CLINIC | Age: 73
End: 2018-11-15

## 2018-11-15 NOTE — TELEPHONE ENCOUNTER
Patient called in to request something for a UTI.  She is scheduled for surgery on Monday and wanted to know if we could call something in and still have the surgery.    After speaking with Liliana, I advised the patient to call her PCP for the UTI, and then let us know and we will ask Dr. Bentley about the surgery status.

## 2018-11-15 NOTE — TELEPHONE ENCOUNTER
Patient called office stating that she had a UTI and that she had gotten Antibiotics from her PCP.  She wanted to know if this would affect her having her RFA on 11/26/2018.  Per Ebony GARRIDO she could go a head with surgery. Patient was advised and expressed understanding for all that was discussed.

## 2018-11-19 ENCOUNTER — ANESTHESIA (OUTPATIENT)
Dept: PERIOP | Facility: HOSPITAL | Age: 73
End: 2018-11-19

## 2018-11-19 ENCOUNTER — HOSPITAL ENCOUNTER (OUTPATIENT)
Facility: HOSPITAL | Age: 73
Setting detail: HOSPITAL OUTPATIENT SURGERY
Discharge: HOME | End: 2018-11-19
Attending: SURGERY | Admitting: SURGERY

## 2018-11-19 ENCOUNTER — ANESTHESIA EVENT (OUTPATIENT)
Dept: PERIOP | Facility: HOSPITAL | Age: 73
End: 2018-11-19

## 2018-11-19 ENCOUNTER — APPOINTMENT (OUTPATIENT)
Dept: ULTRASOUND IMAGING | Facility: HOSPITAL | Age: 73
End: 2018-11-19

## 2018-11-19 VITALS
WEIGHT: 191.36 LBS | TEMPERATURE: 98.2 F | HEIGHT: 64 IN | DIASTOLIC BLOOD PRESSURE: 52 MMHG | BODY MASS INDEX: 32.67 KG/M2 | OXYGEN SATURATION: 92 % | SYSTOLIC BLOOD PRESSURE: 129 MMHG | RESPIRATION RATE: 18 BRPM | HEART RATE: 67 BPM

## 2018-11-19 DIAGNOSIS — I87.2 VENOUS (PERIPHERAL) INSUFFICIENCY: ICD-10-CM

## 2018-11-19 PROCEDURE — 25010000002 PROPOFOL 1000 MG/ML EMULSION: Performed by: NURSE ANESTHETIST, CERTIFIED REGISTERED

## 2018-11-19 PROCEDURE — C1894 INTRO/SHEATH, NON-LASER: HCPCS | Performed by: SURGERY

## 2018-11-19 PROCEDURE — 76937 US GUIDE VASCULAR ACCESS: CPT

## 2018-11-19 PROCEDURE — C1888 ENDOVAS NON-CARDIAC ABL CATH: HCPCS | Performed by: SURGERY

## 2018-11-19 PROCEDURE — 86850 RBC ANTIBODY SCREEN: CPT | Performed by: NURSE PRACTITIONER

## 2018-11-19 PROCEDURE — 86901 BLOOD TYPING SEROLOGIC RH(D): CPT | Performed by: NURSE PRACTITIONER

## 2018-11-19 PROCEDURE — 94799 UNLISTED PULMONARY SVC/PX: CPT

## 2018-11-19 PROCEDURE — 36475 ENDOVENOUS RF 1ST VEIN: CPT | Performed by: SURGERY

## 2018-11-19 PROCEDURE — 25010000002 MIDAZOLAM PER 1 MG: Performed by: ANESTHESIOLOGY

## 2018-11-19 PROCEDURE — 86900 BLOOD TYPING SEROLOGIC ABO: CPT | Performed by: NURSE PRACTITIONER

## 2018-11-19 PROCEDURE — 25010000002 FENTANYL CITRATE (PF) 100 MCG/2ML SOLUTION: Performed by: NURSE ANESTHETIST, CERTIFIED REGISTERED

## 2018-11-19 RX ORDER — FENTANYL CITRATE 50 UG/ML
25 INJECTION, SOLUTION INTRAMUSCULAR; INTRAVENOUS AS NEEDED
Status: CANCELLED | OUTPATIENT
Start: 2018-11-19

## 2018-11-19 RX ORDER — FENTANYL CITRATE 50 UG/ML
INJECTION, SOLUTION INTRAMUSCULAR; INTRAVENOUS AS NEEDED
Status: DISCONTINUED | OUTPATIENT
Start: 2018-11-19 | End: 2018-11-19 | Stop reason: SURG

## 2018-11-19 RX ORDER — SODIUM CHLORIDE 9 MG/ML
INJECTION, SOLUTION INTRAVENOUS AS NEEDED
Status: DISCONTINUED | OUTPATIENT
Start: 2018-11-19 | End: 2018-11-19 | Stop reason: HOSPADM

## 2018-11-19 RX ORDER — OXYCODONE AND ACETAMINOPHEN 10; 325 MG/1; MG/1
1 TABLET ORAL ONCE AS NEEDED
Status: CANCELLED | OUTPATIENT
Start: 2018-11-19

## 2018-11-19 RX ORDER — ONDANSETRON 2 MG/ML
4 INJECTION INTRAMUSCULAR; INTRAVENOUS ONCE AS NEEDED
Status: CANCELLED | OUTPATIENT
Start: 2018-11-19

## 2018-11-19 RX ORDER — HYDROCODONE BITARTRATE AND ACETAMINOPHEN 5; 325 MG/1; MG/1
1 TABLET ORAL ONCE AS NEEDED
Status: DISCONTINUED | OUTPATIENT
Start: 2018-11-19 | End: 2018-11-19 | Stop reason: HOSPADM

## 2018-11-19 RX ORDER — SODIUM CHLORIDE, SODIUM LACTATE, POTASSIUM CHLORIDE, CALCIUM CHLORIDE 600; 310; 30; 20 MG/100ML; MG/100ML; MG/100ML; MG/100ML
100 INJECTION, SOLUTION INTRAVENOUS CONTINUOUS
Status: DISCONTINUED | OUTPATIENT
Start: 2018-11-19 | End: 2018-11-19 | Stop reason: HOSPADM

## 2018-11-19 RX ORDER — IBUPROFEN 600 MG/1
600 TABLET ORAL ONCE AS NEEDED
Status: CANCELLED | OUTPATIENT
Start: 2018-11-19

## 2018-11-19 RX ORDER — METOCLOPRAMIDE HYDROCHLORIDE 5 MG/ML
5 INJECTION INTRAMUSCULAR; INTRAVENOUS
Status: CANCELLED | OUTPATIENT
Start: 2018-11-19

## 2018-11-19 RX ORDER — BUPIVACAINE HCL/0.9 % NACL/PF 0.1 %
2 PLASTIC BAG, INJECTION (ML) EPIDURAL ONCE
Status: COMPLETED | OUTPATIENT
Start: 2018-11-19 | End: 2018-11-19

## 2018-11-19 RX ORDER — SODIUM CHLORIDE, SODIUM LACTATE, POTASSIUM CHLORIDE, CALCIUM CHLORIDE 600; 310; 30; 20 MG/100ML; MG/100ML; MG/100ML; MG/100ML
1000 INJECTION, SOLUTION INTRAVENOUS CONTINUOUS
Status: DISCONTINUED | OUTPATIENT
Start: 2018-11-19 | End: 2018-11-19 | Stop reason: HOSPADM

## 2018-11-19 RX ORDER — NALOXONE HCL 0.4 MG/ML
0.4 VIAL (ML) INJECTION AS NEEDED
Status: CANCELLED | OUTPATIENT
Start: 2018-11-19

## 2018-11-19 RX ORDER — OXYCODONE AND ACETAMINOPHEN 7.5; 325 MG/1; MG/1
2 TABLET ORAL ONCE AS NEEDED
Status: CANCELLED | OUTPATIENT
Start: 2018-11-19

## 2018-11-19 RX ORDER — MIDAZOLAM HYDROCHLORIDE 1 MG/ML
1 INJECTION INTRAMUSCULAR; INTRAVENOUS
Status: DISCONTINUED | OUTPATIENT
Start: 2018-11-19 | End: 2018-11-19 | Stop reason: HOSPADM

## 2018-11-19 RX ORDER — LABETALOL HYDROCHLORIDE 5 MG/ML
5 INJECTION, SOLUTION INTRAVENOUS
Status: CANCELLED | OUTPATIENT
Start: 2018-11-19

## 2018-11-19 RX ORDER — SODIUM CHLORIDE 0.9 % (FLUSH) 0.9 %
3 SYRINGE (ML) INJECTION EVERY 12 HOURS SCHEDULED
Status: DISCONTINUED | OUTPATIENT
Start: 2018-11-19 | End: 2018-11-19 | Stop reason: HOSPADM

## 2018-11-19 RX ORDER — MEPERIDINE HYDROCHLORIDE 25 MG/ML
12.5 INJECTION INTRAMUSCULAR; INTRAVENOUS; SUBCUTANEOUS
Status: CANCELLED | OUTPATIENT
Start: 2018-11-19 | End: 2018-11-20

## 2018-11-19 RX ORDER — ONDANSETRON 2 MG/ML
4 INJECTION INTRAMUSCULAR; INTRAVENOUS ONCE AS NEEDED
Status: DISCONTINUED | OUTPATIENT
Start: 2018-11-19 | End: 2018-11-19 | Stop reason: HOSPADM

## 2018-11-19 RX ORDER — SODIUM CHLORIDE 0.9 % (FLUSH) 0.9 %
3 SYRINGE (ML) INJECTION AS NEEDED
Status: DISCONTINUED | OUTPATIENT
Start: 2018-11-19 | End: 2018-11-19 | Stop reason: HOSPADM

## 2018-11-19 RX ORDER — IPRATROPIUM BROMIDE AND ALBUTEROL SULFATE 2.5; .5 MG/3ML; MG/3ML
3 SOLUTION RESPIRATORY (INHALATION) ONCE AS NEEDED
Status: CANCELLED | OUTPATIENT
Start: 2018-11-19

## 2018-11-19 RX ORDER — SODIUM CHLORIDE 0.9 % (FLUSH) 0.9 %
1-10 SYRINGE (ML) INJECTION AS NEEDED
Status: DISCONTINUED | OUTPATIENT
Start: 2018-11-19 | End: 2018-11-19 | Stop reason: HOSPADM

## 2018-11-19 RX ORDER — ACETAMINOPHEN 500 MG
1000 TABLET ORAL ONCE
Status: COMPLETED | OUTPATIENT
Start: 2018-11-19 | End: 2018-11-19

## 2018-11-19 RX ORDER — MIDAZOLAM HYDROCHLORIDE 1 MG/ML
2 INJECTION INTRAMUSCULAR; INTRAVENOUS
Status: DISCONTINUED | OUTPATIENT
Start: 2018-11-19 | End: 2018-11-19 | Stop reason: HOSPADM

## 2018-11-19 RX ADMIN — Medication 2 G: at 10:22

## 2018-11-19 RX ADMIN — PROPOFOL 100 MCG/KG/MIN: 10 INJECTION, EMULSION INTRAVENOUS at 10:25

## 2018-11-19 RX ADMIN — FENTANYL CITRATE 100 MCG: 50 INJECTION, SOLUTION INTRAMUSCULAR; INTRAVENOUS at 10:25

## 2018-11-19 RX ADMIN — ACETAMINOPHEN 1000 MG: 500 TABLET ORAL at 09:59

## 2018-11-19 RX ADMIN — SODIUM CHLORIDE, POTASSIUM CHLORIDE, SODIUM LACTATE AND CALCIUM CHLORIDE 1000 ML: 600; 310; 30; 20 INJECTION, SOLUTION INTRAVENOUS at 09:15

## 2018-11-19 RX ADMIN — MIDAZOLAM HYDROCHLORIDE 2 MG: 1 INJECTION, SOLUTION INTRAMUSCULAR; INTRAVENOUS at 09:59

## 2018-11-19 RX ADMIN — LIDOCAINE HYDROCHLORIDE 0.5 ML: 10 INJECTION, SOLUTION EPIDURAL; INFILTRATION; INTRACAUDAL; PERINEURAL at 09:15

## 2018-11-19 NOTE — ANESTHESIA POSTPROCEDURE EVALUATION
"Patient: Laura Conner    Procedure Summary     Date:  11/19/18 Room / Location:  [facility] OR  /  PAD HYBRID OR 12    Anesthesia Start:  1022 Anesthesia Stop:  1104    Procedure:  RIGHT SAPHENOUS VEIN RADIO FREQUENCY ABLATION (Right Leg Lower) Diagnosis:       Venous (peripheral) insufficiency      (Venous (peripheral) insufficiency [I87.2])    Surgeon:  Maco Bentley DO Provider:  Terry Rapp CRNA    Anesthesia Type:  general ASA Status:  2          Anesthesia Type: general  Last vitals  BP   153/76 (11/19/18 0852)   Temp   98.2 °F (36.8 °C) (11/19/18 0852)   Pulse   61 (11/19/18 0953)   Resp   18 (11/19/18 0953)     SpO2   97 % (11/19/18 0953)     Post Anesthesia Care and Evaluation    Patient location during evaluation: PACU  Patient participation: complete - patient participated  Level of consciousness: awake and alert  Pain management: adequate  Airway patency: patent  Anesthetic complications: No anesthetic complications    Cardiovascular status: acceptable  Respiratory status: acceptable  Hydration status: acceptable    Comments: Blood pressure 153/76, pulse 61, temperature 98.2 °F (36.8 °C), temperature source Temporal, resp. rate 18, height 162.5 cm (63.98\"), weight 86.8 kg (191 lb 5.8 oz), SpO2 97 %, not currently breastfeeding.    Pt discharged from PACU based on brooklynn score >8      "

## 2018-11-19 NOTE — OP NOTE
Laura Conner  11/19/2018     PREOPERATIVE DIAGNOSIS: Venous (peripheral) insufficiency [I87.2]     POSTOPERATIVE DIAGNOSIS: Post-Op Diagnosis Codes:     * Venous (peripheral) insufficiency [I87.2]     PROCEDURE PERFORMED:   1.  Ultrasound-guided cannulation of the right lower extremity greater saphenous vein  2.  Radio frequency ablation of the right lower extremity greater saphenous vein     SURGEON: Maco Bentley DO      ANESTHESIA: Mac    PREPARATION: Routine.    STAFF: Circulator: Sari Shah RN  Scrub Person: Mehnaz Suh  Assistant: Chacha Patricia  Vascular Ultrasound Technician: James Victor    ESTIMATED BLOOD LOSS: 5 ML    SPECIMENS: None    COMPLICATIONS: None    INDICATIONS: Laura Conner is a 73 y.o. female who has had complaints of leg swelling since about February per recall.  In further discussion regarding her medication review, she was started on Norvasc.  This was recently discontinued and leg swelling improved.  However, her blood pressure elevated and was started back on a lower dose along with HCTZ.  S She does have some spider varicosities and reports cramping at night when she sleeps.  She reports heaviness and tiredness.  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  She did undergo a left lower extremity radiofrequency ablation on 11/5/18 and is now back for follow up.  She does states her swelling has significantly improved. She did have noninasive testing performed, which I did review in office.  The indications, risks, and possible complications of the procedure were explained to the patient, who voiced understanding and wished to proceed with surgery.     PROCEDURE IN DETAIL:   The patient was taken to the operating room and placed on the operating table in a supine position. After Mac anesthesia was obtained, the right lower extremity was prepped and draped in a sterile manner.  Under  ultrasound guidance and using a micropuncture technique the right lower extremity greater saphenous vein was cannulated and a microwire was placed.  This was confirmed under ultrasound.  A small stab incision was made with 11 blade and a 7 Danish sheath was placed.  The patient was placed in Trendelenburg position and the saphenofemoral junction was identified under ultrasound.  The catheter was placed up to the junction and pulled back 3 cm and marked.  Next, tumescent fluid was instilled along the entire length of the vein under ultrasound guidance.  Once sufficient tumescent fluid was placed radio frequency ablation had commenced.  There was a total of 7 RF cycles for a total treatment length of 39.5 cm for a total treatment time of 2 minutes and 20 seconds.  There was an average of 13 W at an average temperature 120°C.  Upon completion of the ablation the catheter and sheath were removed.  Direct pressure was held for an additional 5-10 minutes of ensure hemostasis.  An Ace wrap was placed from the toes to the groin.  Sterile dressings were applied. The patient tolerated the procedure well. Sponge and needle counts were correct. The patient was then awakened and transferred to the outpatient center in stable condition.  Maco Bentley,   Date: 11/19/2018 Time: 10:56 AM     CC:Mich Cedillo MD

## 2018-11-19 NOTE — DISCHARGE INSTRUCTIONS
YOUR NEXT PAIN MEDICATION IS DUE AT______________        Moderate Conscious Sedation, Adult, Care After  Refer to this sheet in the next few weeks. These instructions provide you with information on caring for yourself after your procedure. Your health care provider may also give you more specific instructions. Your treatment has been planned according to current medical practices, but problems sometimes occur. Call your health care provider if you have any problems or questions after your procedure.  WHAT TO EXPECT AFTER THE PROCEDURE    After your procedure:  · You may feel sleepy, clumsy, and have poor balance for several hours.  · Vomiting may occur if you eat too soon after the procedure.  HOME CARE INSTRUCTIONS  · Do not participate in any activities where you could become injured for at least 24 hours. Do not:  ¨ Drive.  ¨ Swim.  ¨ Ride a bicycle.  ¨ Operate heavy machinery.  ¨ Cook.  ¨ Use power tools.  ¨ Climb ladders.  ¨ Work from a high place.  · Do not make important decisions or sign legal documents until you are improved.  · If you vomit, drink water, juice, or soup when you can drink without vomiting. Make sure you have little or no nausea before eating solid foods.  · Only take over-the-counter or prescription medicines for pain, discomfort, or fever as directed by your health care provider.  · Make sure you and your family fully understand everything about the medicines given to you, including what side effects may occur.  · You should not drink alcohol, take sleeping pills, or take medicines that cause drowsiness for at least 24 hours.  · If you smoke, do not smoke without supervision.  · If you are feeling better, you may resume normal activities 24 hours after you were sedated.  · Keep all appointments with your health care provider.  SEEK MEDICAL CARE IF:  · Your skin is pale or bluish in color.  · You continue to feel nauseous or vomit.  · Your pain is getting worse and is not helped by  medicine.  · You have bleeding or swelling.  · You are still sleepy or feeling clumsy after 24 hours.  SEEK IMMEDIATE MEDICAL CARE IF:  · You develop a rash.  · You have difficulty breathing.  · You develop any type of allergic problem.  · You have a fever.  MAKE SURE YOU:  · Understand these instructions.  · Will watch your condition.  · Will get help right away if you are not doing well or get worse.     This information is not intended to replace advice given to you by your health care provider. Make sure you discuss any questions you have with your health care provider.     Document Released: 10/08/2014 Document Revised: 01/08/2016 Document Reviewed: 10/08/2014  [employer] Interactive Patient Education ©2016 [employer]         CALL YOUR PHYSICIAN IF YOU EXPERIENCE  INCREASED PAIN NOT HELPED BY YOUR PAIN MEDICATION.        Fall Prevention in the Home      Falls can cause injuries. They can happen to people of all ages. There are many things you can do to make your home safe and to help prevent falls.    WHAT CAN I DO ON THE OUTSIDE OF MY HOME?  · Regularly fix the edges of walkways and driveways and fix any cracks.  · Remove anything that might make you trip as you walk through a door, such as a raised step or threshold.  · Trim any bushes or trees on the path to your home.  · Use bright outdoor lighting.  · Clear any walking paths of anything that might make someone trip, such as rocks or tools.  · Regularly check to see if handrails are loose or broken. Make sure that both sides of any steps have handrails.  · Any raised decks and porches should have guardrails on the edges.  · Have any leaves, snow, or ice cleared regularly.  · Use sand or salt on walking paths during winter.  · Clean up any spills in your garage right away. This includes oil or grease spills.  WHAT CAN I DO IN THE BATHROOM?    · Use night lights.  · Install grab bars by the toilet and in the tub and shower. Do not use towel bars as grab  bars.  · Use non-skid mats or decals in the tub or shower.  · If you need to sit down in the shower, use a plastic, non-slip stool.  · Keep the floor dry. Clean up any water that spills on the floor as soon as it happens.  · Remove soap buildup in the tub or shower regularly.  · Attach bath mats securely with double-sided non-slip rug tape.  · Do not have throw rugs and other things on the floor that can make you trip.  WHAT CAN I DO IN THE BEDROOM?  · Use night lights.  · Make sure that you have a light by your bed that is easy to reach.  · Do not use any sheets or blankets that are too big for your bed. They should not hang down onto the floor.  · Have a firm chair that has side arms. You can use this for support while you get dressed.  · Do not have throw rugs and other things on the floor that can make you trip.  WHAT CAN I DO IN THE KITCHEN?  · Clean up any spills right away.  · Avoid walking on wet floors.  · Keep items that you use a lot in easy-to-reach places.  · If you need to reach something above you, use a strong step stool that has a grab bar.  · Keep electrical cords out of the way.  · Do not use floor polish or wax that makes floors slippery. If you must use wax, use non-skid floor wax.  · Do not have throw rugs and other things on the floor that can make you trip.  WHAT CAN I DO WITH MY STAIRS?  · Do not leave any items on the stairs.  · Make sure that there are handrails on both sides of the stairs and use them. Fix handrails that are broken or loose. Make sure that handrails are as long as the stairways.  · Check any carpeting to make sure that it is firmly attached to the stairs. Fix any carpet that is loose or worn.  · Avoid having throw rugs at the top or bottom of the stairs. If you do have throw rugs, attach them to the floor with carpet tape.  · Make sure that you have a light switch at the top of the stairs and the bottom of the stairs. If you do not have them, ask someone to add them for  you.  WHAT ELSE CAN I DO TO HELP PREVENT FALLS?  · Wear shoes that:  ¨ Do not have high heels.  ¨ Have rubber bottoms.  ¨ Are comfortable and fit you well.  ¨ Are closed at the toe. Do not wear sandals.  · If you use a stepladder:  ¨ Make sure that it is fully opened. Do not climb a closed stepladder.  ¨ Make sure that both sides of the stepladder are locked into place.  ¨ Ask someone to hold it for you, if possible.  · Clearly jose and make sure that you can see:  ¨ Any grab bars or handrails.  ¨ First and last steps.  ¨ Where the edge of each step is.  · Use tools that help you move around (mobility aids) if they are needed. These include:  ¨ Canes.  ¨ Walkers.  ¨ Scooters.  ¨ Crutches.  · Turn on the lights when you go into a dark area. Replace any light bulbs as soon as they burn out.  · Set up your furniture so you have a clear path. Avoid moving your furniture around.  · If any of your floors are uneven, fix them.  · If there are any pets around you, be aware of where they are.  · Review your medicines with your doctor. Some medicines can make you feel dizzy. This can increase your chance of falling.  Ask your doctor what other things that you can do to help prevent falls.     This information is not intended to replace advice given to you by your health care provider. Make sure you discuss any questions you have with your health care provider.     Document Released: 10/14/2010 Document Revised: 05/03/2016 Document Reviewed: 01/22/2016  [employer] Interactive Patient Education ©2016 [employer]     PATIENT/FAMILY/RESPONSIBLE PARTY VERBALIZES UNDERSTANDING OF ABOVE EDUCATION.  COPY OF PAIN SCALE GIVEN AND REVIEWED WITH VERBALIZED UNDERSTANDING.

## 2018-11-19 NOTE — ANESTHESIA PREPROCEDURE EVALUATION
Anesthesia Evaluation     Patient summary reviewed and Nursing notes reviewed   no history of anesthetic complications:  NPO Solid Status: > 8 hours  NPO Liquid Status: > 8 hours           Airway   Mallampati: I  TM distance: >3 FB  Neck ROM: full  No difficulty expected  Dental      Pulmonary     breath sounds clear to auscultation  Cardiovascular   Exercise tolerance: good (4-7 METS)    ECG reviewed  Rhythm: regular  Rate: normal    (+) hypertension, hyperlipidemia,   (-) CAD, cardiac stents      Neuro/Psych  (-) seizures, TIA, CVA  GI/Hepatic/Renal/Endo    (+)  GERD,  renal disease (UTI on antibiotics), hypothyroidism,   (-) liver disease, diabetes    Musculoskeletal     Abdominal    Substance History      OB/GYN          Other   (+) arthritis   history of cancer (breast cancer, s/p double mastectomy) remission                      Anesthesia Plan    ASA 2     general     intravenous induction   Anesthetic plan, all risks, benefits, and alternatives have been provided, discussed and informed consent has been obtained with: patient.

## 2018-11-19 NOTE — H&P
Laura Conner  [id number]  [id number]  [facility] OR/NONE  Maco Bentley DO  11/19/2018      HPI: I had the pleasure of seeing your patient Laura Conner in the office today.    As you recall, Laura Conner is a 73 y.o.  female who you are currently following for routine health maintenance.  She has had complaints of leg swelling since about February per recall.  In further discussion regarding her medication review, she was started on Norvasc.  This was recently discontinued and leg swelling improved.  However, her blood pressure elevated and was started back on a lower dose along with HCTZ.  S She does have some spider varicosities and reports cramping at night when she sleeps.  She reports heaviness and tiredness.  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  She did undergo a left lower extremity radiofrequency ablation on 11/5/18 and is now back for follow up.  She does states her swelling has significantly improved. She did have noninasive testing performed, which I did review in office.         Past Medical History:   Diagnosis Date   • Breast cancer (CMS/HCC) 1984    left breast cancer   • Gout    • H/O acute renal failure     due to antibiotic   • Hypercholesteremia    • Hypertension    • Hypertriglyceridemia    • Hypothyroid    • Kidney cyst, acquired    • Leg edema    • Osteoarthritis    • Venous insufficiency        Past Surgical History:   Procedure Laterality Date   • AUGMENTATION MAMMAPLASTY     • BLADDER SUSPENSION     • BREAST BIOPSY     • BREAST IMPLANT SURGERY     • CATARACT EXTRACTION, BILATERAL     • FOOT SURGERY     • HYSTERECTOMY     • MASTECTOMY Bilateral 1984    lymph nodes removed on left   • OOPHORECTOMY     • THYROIDECTOMY, PARTIAL     • TONSILLECTOMY     • TOTAL KNEE ARTHROPLASTY Left        Family History   Problem Relation Age of Onset   • Breast cancer Sister    • Bone cancer Sister    • Stroke Sister    •  Heart failure Mother    • Diabetes Mother    • Arthritis Mother    • Heart disease Father    • Stroke Father    • Prostate cancer Father    • Aneurysm Father    • Lung cancer Brother    • Diabetes Brother    • Heart disease Sister    • Parkinsonism Sister    • Stroke Paternal Grandmother        Social History     Socioeconomic History   • Marital status:      Spouse name: Not on file   • Number of children: Not on file   • Years of education: Not on file   • Highest education level: Not on file   Social Needs   • Financial resource strain: Not on file   • Food insecurity - worry: Not on file   • Food insecurity - inability: Not on file   • Transportation needs - medical: Not on file   • Transportation needs - non-medical: Not on file   Occupational History   • Not on file   Tobacco Use   • Smoking status: Never Smoker   • Smokeless tobacco: Never Used   Substance and Sexual Activity   • Alcohol use: No   • Drug use: No   • Sexual activity: Defer   Other Topics Concern   • Not on file   Social History Narrative   • Not on file       Allergies   Allergen Reactions   • Ciprofloxacin Other (See Comments)     Caused patient to go into kidney failure   • Morphine And Related Anxiety     Patient states that she couldn't sit still       Medications Prior to Admission   Medication Sig Dispense Refill Last Dose   • amLODIPine (NORVASC) 2.5 MG tablet Take 2.5 mg by mouth Daily.   11/19/2018 at 0530   • aspirin 81 MG EC tablet Take 81 mg by mouth Daily.   11/12/2018   • buPROPion (WELLBUTRIN) 75 MG tablet Take 75 mg by mouth Daily As Needed (for stress).   11/18/2018 at Unknown time   • HYDROCHLOROTHIAZIDE PO Take 2.5 mg by mouth Daily.   11/18/2018 at Unknown time   • irbesartan (AVAPRO) 300 MG tablet Take 300 mg by mouth Every Night.   11/18/2018 at Unknown time   • levothyroxine (SYNTHROID, LEVOTHROID) 100 MCG tablet Take 100 mcg by mouth Daily.   11/18/2018 at Unknown time   • Magnesium Hydroxide (MAGNESIA PO) Take 1  "tablet by mouth Daily.   11/18/2018 at Unknown time   • meloxicam (MOBIC) 15 MG tablet Take 15 mg by mouth Daily.   11/18/2018 at Unknown time   • simvastatin (ZOCOR) 20 MG tablet Take 20 mg by mouth Every Night.   11/18/2018 at Unknown time       Review of Systems  Constitutional: Negative.    HENT: Negative.    Eyes: Negative.    Respiratory: Negative.    Cardiovascular: Positive for leg swelling (with cramping to BLE).   Gastrointestinal: Negative.    Endocrine: Negative.    Genitourinary: Negative.    Musculoskeletal: Positive for neck pain (intermittent muscle spasm right posterior neck).   Skin: Negative.    Allergic/Immunologic: Negative.    Neurological: Negative.    Hematological: Negative.    Psychiatric/Behavioral: The patient is nervous/anxious.           /76 (BP Location: Left arm, Patient Position: Sitting)   Pulse 67   Temp 98.2 °F (36.8 °C) (Temporal)   Resp 18   Ht 162.5 cm (63.98\")   Wt 86.8 kg (191 lb 5.8 oz)   LMP  (LMP Unknown)   SpO2 97%   BMI 32.87 kg/m²   Physical Exam  Constitutional: She is oriented to person, place, and time. She appears well-developed and well-nourished. No distress.   HENT:   Head: Normocephalic and atraumatic.   Mouth/Throat: Oropharynx is clear and moist.   Eyes: Pupils are equal, round, and reactive to light. No scleral icterus.   Neck: Normal range of motion. Neck supple. No JVD present. Carotid bruit is not present. No thyromegaly present.   Cardiovascular: Normal rate, regular rhythm, S2 normal, normal heart sounds, intact distal pulses and normal pulses. Exam reveals no gallop and no friction rub.   No murmur heard.  Pulmonary/Chest: Effort normal and breath sounds normal.   Abdominal: Soft. Normal aorta and bowel sounds are normal. There is no hepatosplenomegaly.   Musculoskeletal: Normal range of motion. She exhibits edema (to bilateral lower extremities).   Neurological: She is alert and oriented to person, place, and time. No cranial nerve " deficit.   Skin: Skin is warm and dry. She is not diaphoretic.   Psychiatric: She has a normal mood and affect. Her behavior is normal. Judgment and thought content normal.   Nursing note and vitals reviewed.      Lab Results (last 7 days)     ** No results found for the last 168 hours. **          Imaging Results (last 7 days)     ** No results found for the last 168 hours. **          Impression:  1. Venous (peripheral) insufficiency           Plan:  After thoroughly evaluating Laura Conner, I am pleased she is doing well status radiofrequency ablation of the left lower extremity.  Her swelling has improved and leg feels great.  She would like to proceed with a radiofrequency ablation of the right lower extremity greater saphenous vein, followed by the right.   Risks of radiofrequency ablation include, but are not limited to, bleeding, infection, vessel damage, nerve damage, DVT, phlebitis, and pulmonary embolus.  The patient understands these risks and wishes to proceed with procedure.   I would like her to continue wearing compression stockings in the 20-30 mm pressure gradient range.  I did instruct the patient on how to wear these on a daily basis.  As far as her carotid disease, we will recheck this in 1 year.   Body mass index is 31.76 kg/m². BMI chart given.  The patient can continue taking her current medication regimen as previously planned.  This was all discussed in full with complete understanding.        Maco Bentley, DO

## 2018-11-27 ENCOUNTER — TELEPHONE (OUTPATIENT)
Dept: VASCULAR SURGERY | Facility: CLINIC | Age: 73
End: 2018-11-27

## 2018-11-27 ENCOUNTER — HOSPITAL ENCOUNTER (OUTPATIENT)
Dept: ULTRASOUND IMAGING | Facility: HOSPITAL | Age: 73
Discharge: HOME | End: 2018-11-27
Admitting: NURSE PRACTITIONER

## 2018-11-27 DIAGNOSIS — I87.391 CHRONIC VENOUS HYPERTENSION (IDIOPATHIC) WITH OTHER COMPLICATIONS OF RIGHT LOWER EXTREMITY: ICD-10-CM

## 2018-11-27 DIAGNOSIS — I73.9 PAD (PERIPHERAL ARTERY DISEASE) (HCC): ICD-10-CM

## 2018-11-27 DIAGNOSIS — I73.9 PAD (PERIPHERAL ARTERY DISEASE) (HCC): Primary | ICD-10-CM

## 2018-11-27 PROCEDURE — 93971 EXTREMITY STUDY: CPT | Performed by: SURGERY

## 2018-11-27 PROCEDURE — 93971 EXTREMITY STUDY: CPT

## 2018-11-27 NOTE — TELEPHONE ENCOUNTER
SPOKE W PT TO VERIFY APPT TIME WITH [facility] TOMORROW. PT ALSO STATED SHE HAD HER US DONE TODAY AT [facility].

## 2018-11-27 NOTE — TELEPHONE ENCOUNTER
Spoke with Mrs Conner reminding her of her appointment for Wednesday, November 28th, 2018 at 11 am. Mrs Conner confirmed she would be here.     Had test completed on 11/27/2018 at [facility]

## 2018-11-28 ENCOUNTER — OFFICE VISIT (OUTPATIENT)
Dept: VASCULAR SURGERY | Facility: CLINIC | Age: 73
End: 2018-11-28

## 2018-11-28 VITALS
HEIGHT: 64 IN | WEIGHT: 191 LBS | SYSTOLIC BLOOD PRESSURE: 138 MMHG | HEART RATE: 66 BPM | DIASTOLIC BLOOD PRESSURE: 80 MMHG | BODY MASS INDEX: 32.61 KG/M2 | OXYGEN SATURATION: 99 %

## 2018-11-28 DIAGNOSIS — I87.2 VENOUS INSUFFICIENCY: Primary | ICD-10-CM

## 2018-11-28 DIAGNOSIS — I10 ESSENTIAL HYPERTENSION: ICD-10-CM

## 2018-11-28 DIAGNOSIS — E78.00 HYPERCHOLESTEREMIA: ICD-10-CM

## 2018-11-28 DIAGNOSIS — E78.1 HYPERTRIGLYCERIDEMIA: ICD-10-CM

## 2018-11-28 PROBLEM — Z01.818 PREOP TESTING: Status: RESOLVED | Noted: 2018-10-22 | Resolved: 2018-11-28

## 2018-11-28 PROCEDURE — 99213 OFFICE O/P EST LOW 20 MIN: CPT | Performed by: NURSE PRACTITIONER

## 2018-11-28 RX ORDER — LOSARTAN POTASSIUM 100 MG/1
100 TABLET ORAL DAILY
COMMUNITY
End: 2020-10-01

## 2018-11-28 NOTE — PROGRESS NOTES
11/28/2018       Mich Cedillo MD  [address]  [place] [address]    Laura Conner  1945    Chief Complaint   Patient presents with   • Follow-up     1 Week Post-Op Follow Up For RIGHT SAPHENOUS VEIN RADIO FREQUENCY ABLATION. Test 11/27/2018 US pad venous lower ext right. Patient denies any stroke like symptoms.    • other     Patient states on left leg there is some pain on front side of knee area and on the back side of the knee.        Dear Mich Cedillo MD       HPI  I had the pleasure of seeing your patient Laura Conner in the office today.    As you recall, Laura Conner is a 73 y.o.  female who we are following for venous insufficiency and asymptomatic carotid occlusive disease.  She has had complaints of leg swelling since about February per recall.  She does have some spider varicosities and reported cramping at night with heaviness and tiredness.  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  She did undergo a left lower extremity radiofrequency ablation on 11/5/18 and most recently a right lower extremity radiofrequency ablation on 11/19/18 and is now back for follow up.  She does states her swelling has significantly improved and her leg feels great. She does have complaints of swelling to medial left knee and tenderness.  She is maintained on Zocor and aspirin.  She did have noninasive testing performed, which I did review in office.         Review of Systems   Constitutional: Negative.    HENT: Negative.    Eyes: Negative.    Respiratory: Negative.    Cardiovascular: Positive for leg swelling (with cramping to BLE).   Gastrointestinal: Negative.    Endocrine: Negative.    Genitourinary: Negative.    Musculoskeletal: Positive for neck pain (intermittent muscle spasm right posterior neck).   Skin: Negative.    Allergic/Immunologic: Negative.    Neurological: Negative.    Hematological: Negative.   "  Psychiatric/Behavioral: The patient is nervous/anxious.        /80 (BP Location: Left arm, Patient Position: Sitting, Cuff Size: Adult)   Pulse 66   Ht 162.6 cm (64\")   Wt 86.6 kg (191 lb)   LMP  (LMP Unknown)   SpO2 99%   BMI 32.79 kg/m²   Physical Exam   Constitutional: She is oriented to person, place, and time. She appears well-developed and well-nourished. No distress.   HENT:   Head: Normocephalic and atraumatic.   Mouth/Throat: Oropharynx is clear and moist.   Eyes: Pupils are equal, round, and reactive to light. No scleral icterus.   Neck: Normal range of motion. Neck supple. No JVD present. Carotid bruit is not present. No thyromegaly present.   Cardiovascular: Normal rate, regular rhythm, S2 normal, normal heart sounds, intact distal pulses and normal pulses. Exam reveals no gallop and no friction rub.   No murmur heard.  Pulmonary/Chest: Effort normal and breath sounds normal.   Abdominal: Soft. Normal aorta and bowel sounds are normal. There is no hepatosplenomegaly.   Musculoskeletal: Normal range of motion. She exhibits edema (to bilateral lower extremities).   Swelling to left medial knee with tenderness   Neurological: She is alert and oriented to person, place, and time. No cranial nerve deficit.   Skin: Skin is warm and dry. She is not diaphoretic.   Psychiatric: She has a normal mood and affect. Her behavior is normal. Judgment and thought content normal.   Nursing note and vitals reviewed.    Diagnostic Data:    Us Guided Vascular Access    Result Date: 11/20/2018  Narrative: History: Limited lower extremity venous duplex was performed of the time of greater saphenous vein radiofrequency ablation.  Comment: Venous duplex was performed using grayscale imaging and color flow Doppler.  FINDINGS: The greater saphenous vein was successfully accessed under direct ultrasound guidance and the radiofrequency ablation catheter then advanced and positioned with tip 3 cm distal to the " saphenofemoral junction.      Impression: Successful cannulation of the greater saphenous vein and placement of the radio frequency ablation catheter with tip 3 cm distal to the saphenofemoral junction. This report was finalized on 11/20/2018 16:04 by Dr. Andrew Steele MD.    Us Guided Vascular Access    Result Date: 11/7/2018  Narrative: History: Intraoperative venous duplex was performed at the time of greater saphenous vein radiofrequency ablation.  Comment: Limited venous duplex was performed using gray scale imaging and color flow Doppler.  FINDINGS: The greater saphenous vein was successfully cannulated under ultrasound guidance and radiofrequency ablation catheter advanced to the saphenofemoral junction and pulled back 3 cm and marked.      Impression: Successful cannulation and radiofrequency ablation catheter placement at the time of her saphenous vein radiofrequency ablation. This report was finalized on 11/07/2018 16:29 by Dr. Andrew Steele MD.    Us Venous Doppler Lower Extremity Left (duplex)    Result Date: 11/13/2018  Narrative: History: Patient is status post left lower extremity greater saphenous vein radiofrequency ablation on 11/5/2018.      Impression: Impression: There is no evidence of deep venous thrombosis or superficial thrombophlebitis of the left lower extremity. The left greater saphenous vein appears successfully closed from 1.9 cm distal to the saphenofemoral junction  Comments: Left lower extremity venous duplex exam was performed using color Doppler flow, Doppler wave form analysis, and grayscale imaging, with and without compression. There is no evidence of deep venous thrombosis of the common femoral, superficial femoral, popliteal, posterior tibial, and peroneal veins. The left greater saphenous vein appears successfully closed from 1.9 cm distal to the saphenofemoral junction.  This report was finalized on 11/13/2018 13:02 by Dr. Andrew Steele MD.    Us Venous Doppler Lower  Extremity Right (duplex)    Result Date: 11/27/2018  Narrative: History: Swelling      Impression: Impression: 1. There is no evidence of deep venous thrombosis of the right lower extremity. 2. The greater saphenous vein is closed from zones 2 through 5.  Comments: Right lower extremity venous duplex exam was performed using color Doppler flow, Doppler wave form analysis, and grayscale imaging, with and without compression. There is no evidence of deep venous thrombosis of the common femoral, superficial femoral, popliteal, posterior tibial, and peroneal veins. There is no thrombus identified in the saphenofemoral junction. There is no evidence of clot in the left common femoral vein.  This report was finalized on 11/27/2018 12:59 by Dr. Maco Bentley MD.      Patient Active Problem List   Diagnosis   • Diverticulitis   • Diverticulosis   • Gastroesophageal reflux disease   • History of colon polyps   • Hyperlipidemia   • Hypothyroidism   • Insomnia   • Irritable bowel syndrome with diarrhea   • Primary osteoarthritis of left knee   • BMI 33.0-33.9,adult   • Venous insufficiency   • Hypertension   • Hypercholesteremia   • Hypertriglyceridemia         ICD-10-CM ICD-9-CM   1. Venous insufficiency I87.2 459.81   2. Essential hypertension I10 401.9   3. Hypercholesteremia E78.00 272.0   4. Hypertriglyceridemia E78.1 272.1       Plan: After thoroughly evaluating Laura Conner, I am pleased she is doing well status radiofrequency ablation of the right lower extremity.  Her swelling has improved and leg feels great.  Her testing is as expected post radiofrequency ablation and without evidence of DVT.  Her left knee is swollen medially, no significant swelling of her leg.  Her pain is localized to that spot.  Her surgery was several weeks ago to that leg and no problems up until a few days ago.  I guess it is possible she could have a phlebitis, however I do not feel this on exam.  She could try warm compresses and  ibuprofen.  If this does not help or symptoms change, she could follow with her PCP regarding.   We will see her back at her regularly scheduled appointment with repeat noninvasive testing, including a carotid duplex.  I would like her to continue wearing compression stockings in the 20-30 mm pressure gradient range.  I did instruct the patient on how to wear these on a daily basis.  As far as her carotid disease, we will recheck this in 1 year.   Body mass index is 32.79 kg/m². BMI chart given.  The patient can continue taking her current medication regimen as previously planned.  This was all discussed in full with complete understanding.    Thank you for allowing me to participate in the care of your patient.  Please do not hesitate with any questions or concerns.  I will keep you aware of any further encounters with Laura Conner.        Sincerely yours,         RADHIKA Bryan, Mich Garcia MD

## 2019-03-07 ENCOUNTER — TRANSCRIBE ORDERS (OUTPATIENT)
Dept: ADMINISTRATIVE | Facility: HOSPITAL | Age: 74
End: 2019-03-07

## 2019-03-07 DIAGNOSIS — Z12.31 ENCOUNTER FOR SCREENING MAMMOGRAM FOR MALIGNANT NEOPLASM OF BREAST: Primary | ICD-10-CM

## 2019-05-06 ENCOUNTER — OFFICE VISIT (OUTPATIENT)
Dept: GASTROENTEROLOGY | Age: 74
End: 2019-05-06

## 2019-05-06 VITALS
HEIGHT: 64 IN | SYSTOLIC BLOOD PRESSURE: 138 MMHG | DIASTOLIC BLOOD PRESSURE: 81 MMHG | BODY MASS INDEX: 32.95 KG/M2 | WEIGHT: 193 LBS | OXYGEN SATURATION: 96 % | HEART RATE: 66 BPM

## 2019-05-06 DIAGNOSIS — Z87.19 HISTORY OF COLONIC DIVERTICULITIS: ICD-10-CM

## 2019-05-06 DIAGNOSIS — Z86.010 HISTORY OF ADENOMATOUS POLYP OF COLON: Primary | ICD-10-CM

## 2019-05-06 DIAGNOSIS — Z12.11 SCREENING FOR COLON CANCER: ICD-10-CM

## 2019-05-06 PROCEDURE — 99999 PR OFFICE/OUTPT VISIT,PROCEDURE ONLY: CPT | Performed by: NURSE PRACTITIONER

## 2019-05-06 RX ORDER — LOSARTAN POTASSIUM AND HYDROCHLOROTHIAZIDE 12.5; 1 MG/1; MG/1
TABLET ORAL
Refills: 5 | COMMUNITY
Start: 2019-03-10 | End: 2020-08-03 | Stop reason: ALTCHOICE

## 2019-05-06 ASSESSMENT — ENCOUNTER SYMPTOMS
ABDOMINAL PAIN: 0
CONSTIPATION: 0
BACK PAIN: 0
SORE THROAT: 0
DIARRHEA: 0
SHORTNESS OF BREATH: 0
NAUSEA: 0
COUGH: 0
VOMITING: 0
ABDOMINAL DISTENTION: 0
VOICE CHANGE: 0
CHEST TIGHTNESS: 0
BLOOD IN STOOL: 0
RECTAL PAIN: 0

## 2019-05-06 NOTE — PROGRESS NOTES
Subjective:      Patient ID: Ana Baca is a 75 y.o. female  Arn Bence, MD  No ref. provider found    HPI  Chief Complaint   Patient presents with    Colonoscopy    Other     history of colon polyps     Patient with history of adenomatous colon polyps due for screening colonoscopy. The patient denies abdominal or flank pain, anorexia, nausea or vomiting, dysphagia, change in bowel habits or black or bloody stools or weight loss. She has history of diverticulitis. States she has had major flare about every 15 months for 3 occurrences but has now passed the 15 month time and has not had a problem.            Family History   Problem Relation Age of Onset    Prostate Cancer Father     Breast Cancer Sister     Lung Cancer Brother     Colon Cancer Paternal Aunt     Colon Polyps Paternal Aunt     Esophageal Cancer Neg Hx     Liver Cancer Neg Hx     Liver Disease Neg Hx     Rectal Cancer Neg Hx     Stomach Cancer Neg Hx        Past Medical History:   Diagnosis Date    Acute renal failure ([address] 07/29/2016    discharged 8/1/16; \"due to strong antibiotic and being dehydrated\"    Arthritis     Breast cancer ([address]     surgery only    Cancer ([address]     Breast cancer    Colon polyps     Diverticulitis     GERD (gastroesophageal reflux disease)     Hyperlipidemia     Hypertension     Joint pain     Thyroid condition        Past Surgical History:   Procedure Laterality Date    ABDOMEN SURGERY      BLADDER REPAIR      BLEPHAROPLASTY      BREAST ENHANCEMENT SURGERY      BREAST SURGERY  1984    [place] Mastectomy    BUNIONECTOMY      [facility]    COLONOSCOPY  04/29/2014    TA x 2, HP (5 yr)    EYE SURGERY      Bilateral cataracts    HYSTERECTOMY      partial    KNEE SURGERY Left 08/17/2016    OVARY REMOVAL      PATELLA SURGERY      THYROIDECTOMY, PARTIAL      TONSILLECTOMY AND ADENOIDECTOMY      TOTAL KNEE ARTHROPLASTY Left 8/17/2016    KNEE TOTAL ARTHROPLASTY performed by Josey Reyes MD at [facility] OR       Current Outpatient Medications   Medication Sig Dispense Refill    losartan-hydrochlorothiazide (HYZAAR) 100-12.5 MG per tablet TAKE 1 TABLET BY MOUTH EVERY DAY  5    meloxicam (MOBIC) 15 MG tablet Take 15 mg by mouth daily      amLODIPine (NORVASC) 5 MG tablet Take 2.5 mg by mouth daily       levothyroxine (SYNTHROID) 50 MCG tablet Take of the 100 mcg Synthroid - use two 50s (Patient taking differently: Take 100 mcg by mouth Daily Take of the 100 mcg Synthroid - use two 50s) 30 tablet 0    aspirin 81 MG EC tablet Take 81 mg by mouth daily      SIMVASTATIN PO Take 20 mg by mouth nightly        No current facility-administered medications for this visit. Allergies   Allergen Reactions    Ciprofloxacin     Morphine      Very Restless        reports that she has never smoked. She has never used smokeless tobacco. She reports that she does not drink alcohol or use drugs. Review of Systems   Constitutional: Negative for appetite change, chills, fever and unexpected weight change. HENT: Negative for sore throat and voice change. Respiratory: Negative for cough, chest tightness and shortness of breath. Cardiovascular: Negative for chest pain, palpitations and leg swelling. Gastrointestinal: Negative for abdominal distention, abdominal pain, blood in stool, constipation, diarrhea, nausea, rectal pain and vomiting. Musculoskeletal: Positive for arthralgias. Negative for back pain and gait problem. Skin: Negative for pallor, rash and wound. Neurological: Negative for dizziness, weakness and light-headedness. Hematological: Negative for adenopathy. Does not bruise/bleed easily. All other systems reviewed and are negative. Objective:   Physical Exam   Constitutional: She is oriented to person, place, and time. She appears well-developed and well-nourished. No distress.    /81   Pulse 66   Ht 5' 4\" (1.626 m)   Wt 193 lb (87.5 kg)   SpO2 96%   BMI 33.13 kg/m²      Eyes: Conjunctivae are normal. No scleral icterus. Neck: No tracheal deviation present. Cardiovascular: Normal rate and regular rhythm. Exam reveals no gallop and no friction rub. No murmur heard. Pulmonary/Chest: Effort normal and breath sounds normal. No respiratory distress. She has no wheezes. She has no rhonchi. She has no rales. Abdominal: Soft. Normal appearance and bowel sounds are normal. She exhibits no distension and no mass. There is no hepatomegaly. There is no tenderness. There is no rebound and no guarding. Musculoskeletal: She exhibits no edema. Neurological: She is alert and oriented to person, place, and time. She has normal strength. Skin: Skin is warm, dry and intact. No cyanosis. No pallor. Psychiatric: She has a normal mood and affect. Her behavior is normal. Thought content normal. Cognition and memory are normal.       Assessment:      1. History of adenomatous polyp of colon    2. History of colonic diverticulitis    3. Screening for colon cancer          Plan:      Schedule colonoscopy screening    Instruct on bowel prep. Nothing to eat or drink after midnight the day of the exam.  Unable to drive for 24 hours after the procedure. No aspirin or nonsteroidal anti-inflammatories for 5 days before procedure. I have discussed the benefits, alternatives, and risks (including bleeding, perforation and death)  for pursuing Endoscopy (EGD/Colonscopy/EUS/ERCP) with the patient and they are willing to continue. We also discussed the need for anesthesia, IV access, proper dietary changes, medication changes if necessary, and need for bowel prep (if ordered) prior to their Endoscopic procedure. They are aware they must have someone accompany them to their scheduled procedure to drive them home - they agree to the above and are willing to continue.      Plan for anesthesia: MAC    Discussed diverticulosis / diverticulitis with

## 2019-05-06 NOTE — PATIENT INSTRUCTIONS
Schedule colonoscopy. No aspirin, ibuprofen, naproxen, fish oil or vitamin E for 5 days before procedure. Do not eat or drink after midnight the day of the procedure. Allowed medications can be taken with a small sip of water. Please review your prep instructions for allowed medications. You will not be able to drive for 24 hours after the procedure due to sedation. Bring a  with you the day of the procedure. If you are on blood thinners, clearance from the prescribing physician will be obtained before your procedure is scheduled. If it is determined it is not safe to hold these medications for a short time an alternative procedure for evaluation may be recommended. Risks of colonoscopy include, but are not limited to, perforation, bleeding, and infection, Risk of perforation and bleeding are increased if there is a polyp removed. Anesthesia risks will be reviewed with you before the procedure by a member of the anesthesia department. Your physician may also schedule a follow up appointment with the nurse practitioner to discuss pathology, symptoms or to check if you have had any problems related to your procedure. If you prefer not to return to the office after your procedure please discuss this with your physician on the day of your colonoscopy. The physician will talk with you and/or your family after the procedure is completed. Final recommendations are based on the pathologist report if biopsies or specimens are taken. For Colonoscopy: You will be given specific directions regarding restrictions to diet and bowel prep instructions including laxatives. Please read these instructions one week prior to your scheduled procedure to ensure that you are prepared. If you have any questions regarding these instructions please call our office Mon through Fri from 8:00 am to 4:00 pm.     Follow prep instructions provided for bowel prep. Take all of the bowel prep as directed.  If you are having problems with nausea, stop your prep for 30-45 min to allow the nausea to subside before resuming your prep. It is important to drink plenty of fluids throughout the day before taking your laxatives. This will help to protect your kidneys, prevent dehydration and maximize the effect of the bowel prep. If polyps are removed during the procedure they will be sent to a pathologist for analysis. Unless you have a follow up appointment scheduled, you will be notified by mail of the pathology results within 4 weeks. If you have not received results after 4 weeks you may call the office to obtain this information. Your diet before a colonoscopy bowel preparation is very important to ensure a successful colon exam. It is recommended to consider certain changes to your diet three to four days prior to the procedure. Remember that your bowels need to be empty for the exam.    What foods are good to eat? Cut down on heavy solid foods three to four days before the procedure and start introducing lighter meals to your diet. The following food suggestions are a good part of your diet before a colonoscopy bowel preparation. Light meat that is easily digestible such as chicken (without the skin)   Potatoes without skin   Cheese   Eggs   A light meal of steamed white fish   Light clear soups    Foods and drinks to avoid  Avoid foods that contain too much fiber. Stay clear of dark colored beverages. They can stick to the walls of the digestive tract and make it difficult to differentiate from blood. Some of these foods are:  Red meat, rice, nuts and vegetables   Milk, other milk based fluids and cream   Most fruit and puddings   Whole grain pasta   Cereals, bran and seeds   Colored beverages, especially those that are red or purple in color   Red colored Jell-O   On the day before the colonoscopy, continue to drink plenty of clear fluids.  It is important   to keep yourself hydrated before the exam. Please follow all instructions as provided for cleansing the bowel. Failure to have an adequately prepped colon may cause you to have incomplete exam with further testing required.      [email or web address]

## 2019-05-10 ENCOUNTER — TELEPHONE (OUTPATIENT)
Dept: GASTROENTEROLOGY | Age: 74
End: 2019-05-10

## 2019-05-10 NOTE — TELEPHONE ENCOUNTER
Spoke to pt and I have called in new RX for Plenvu Bowel Prep for her. She understands the expense and is willing to pay for it. She will be picking up new instructions for this at the front window either today or next week.

## 2019-05-10 NOTE — TELEPHONE ENCOUNTER
Patient called in and stated that she would like to try the new prep that she was told about when she scheduled her CLN. Please call the patient with this information and sent in the RX for her.   Thank you

## 2019-05-20 ENCOUNTER — ANESTHESIA EVENT (OUTPATIENT)
Dept: OPERATING ROOM | Age: 74
End: 2019-05-20

## 2019-05-21 ENCOUNTER — APPOINTMENT (OUTPATIENT)
Dept: OPERATING ROOM | Age: 74
End: 2019-05-21

## 2019-05-21 ENCOUNTER — ANESTHESIA (OUTPATIENT)
Dept: OPERATING ROOM | Age: 74
End: 2019-05-21

## 2019-05-21 ENCOUNTER — HOSPITAL ENCOUNTER (OUTPATIENT)
Age: 74
Setting detail: OUTPATIENT SURGERY
Discharge: HOME | End: 2019-05-21
Attending: INTERNAL MEDICINE | Admitting: INTERNAL MEDICINE
Payer: MEDICARE

## 2019-05-21 ENCOUNTER — HOSPITAL ENCOUNTER (OUTPATIENT)
Age: 74
Setting detail: SPECIMEN
Discharge: HOME | End: 2019-05-21
Payer: MEDICARE

## 2019-05-21 VITALS — SYSTOLIC BLOOD PRESSURE: 114 MMHG | OXYGEN SATURATION: 94 % | DIASTOLIC BLOOD PRESSURE: 64 MMHG

## 2019-05-21 VITALS
SYSTOLIC BLOOD PRESSURE: 110 MMHG | RESPIRATION RATE: 18 BRPM | TEMPERATURE: 97.2 F | WEIGHT: 190 LBS | HEIGHT: 64 IN | DIASTOLIC BLOOD PRESSURE: 63 MMHG | BODY MASS INDEX: 32.44 KG/M2 | HEART RATE: 79 BPM | OXYGEN SATURATION: 98 %

## 2019-05-21 PROCEDURE — 88305 TISSUE EXAM BY PATHOLOGIST: CPT

## 2019-05-21 PROCEDURE — 45380 COLONOSCOPY AND BIOPSY: CPT

## 2019-05-21 PROCEDURE — 45380 COLONOSCOPY AND BIOPSY: CPT | Performed by: INTERNAL MEDICINE

## 2019-05-21 RX ORDER — LIDOCAINE HYDROCHLORIDE 10 MG/ML
1 INJECTION, SOLUTION EPIDURAL; INFILTRATION; INTRACAUDAL; PERINEURAL
Status: DISCONTINUED | OUTPATIENT
Start: 2019-05-21 | End: 2019-05-21 | Stop reason: HOSPADM

## 2019-05-21 RX ORDER — PROPOFOL 10 MG/ML
INJECTION, EMULSION INTRAVENOUS PRN
Status: DISCONTINUED | OUTPATIENT
Start: 2019-05-21 | End: 2019-05-21 | Stop reason: SDUPTHER

## 2019-05-21 RX ORDER — LIDOCAINE HYDROCHLORIDE 10 MG/ML
INJECTION, SOLUTION INFILTRATION; PERINEURAL PRN
Status: DISCONTINUED | OUTPATIENT
Start: 2019-05-21 | End: 2019-05-21 | Stop reason: SDUPTHER

## 2019-05-21 RX ORDER — GLYCOPYRROLATE 0.6MG/3ML
SYRINGE (ML) INTRAVENOUS PRN
Status: DISCONTINUED | OUTPATIENT
Start: 2019-05-21 | End: 2019-05-21 | Stop reason: SDUPTHER

## 2019-05-21 RX ORDER — SODIUM CHLORIDE 9 MG/ML
INJECTION, SOLUTION INTRAVENOUS CONTINUOUS
Status: DISCONTINUED | OUTPATIENT
Start: 2019-05-21 | End: 2019-05-21 | Stop reason: HOSPADM

## 2019-05-21 RX ORDER — SODIUM CHLORIDE, SODIUM LACTATE, POTASSIUM CHLORIDE, CALCIUM CHLORIDE 600; 310; 30; 20 MG/100ML; MG/100ML; MG/100ML; MG/100ML
INJECTION, SOLUTION INTRAVENOUS CONTINUOUS
Status: DISCONTINUED | OUTPATIENT
Start: 2019-05-21 | End: 2019-05-21 | Stop reason: HOSPADM

## 2019-05-21 RX ADMIN — LIDOCAINE HYDROCHLORIDE 40 MG: 10 INJECTION, SOLUTION INFILTRATION; PERINEURAL at 09:32

## 2019-05-21 RX ADMIN — Medication 0.2 MG: at 09:41

## 2019-05-21 RX ADMIN — PROPOFOL 280 MG: 10 INJECTION, EMULSION INTRAVENOUS at 09:32

## 2019-05-21 RX ADMIN — SODIUM CHLORIDE, SODIUM LACTATE, POTASSIUM CHLORIDE, CALCIUM CHLORIDE: 600; 310; 30; 20 INJECTION, SOLUTION INTRAVENOUS at 09:28

## 2019-05-21 ASSESSMENT — PAIN - FUNCTIONAL ASSESSMENT: PAIN_FUNCTIONAL_ASSESSMENT: 0-10

## 2019-05-21 NOTE — ANESTHESIA POSTPROCEDURE EVALUATION
Department of Anesthesiology  Postprocedure Note    Patient: Taylor Gonzalez  MRN: [id number]  YOB: 1945  Date of evaluation: 5/21/2019  Time:  9:53 AM     Procedure Summary     Date:  05/21/19 Room / Location:  [facility] ENDO 01 / [facility] OR    Anesthesia Start:  0928 Anesthesia Stop:      Procedure:  COLONOSCOPY DIAGNOSTIC with biopsy (N/A Abdomen) Diagnosis:  (SCREEN - HX SALVADOR POLYPS)    Surgeon:  Hemant Prather MD Responsible Provider:  JEANNA Deleon CRNA    Anesthesia Type:  general ASA Status:  2          Anesthesia Type: general    Sophie Phase I:      Sophie Phase II:      Last vitals: Reviewed and per EMR flowsheets.        Anesthesia Post Evaluation    Patient location during evaluation: bedside  Patient participation: complete - patient participated  Level of consciousness: sleepy but conscious  Pain score: 0  Airway patency: patent  Nausea & Vomiting: no nausea and no vomiting  Complications: no  Cardiovascular status: hemodynamically stable and blood pressure returned to baseline  Respiratory status: acceptable and nasal cannula  Hydration status: stable

## 2019-05-21 NOTE — ANESTHESIA PRE PROCEDURE
Department of Anesthesiology  Preprocedure Note       Name:  Orlando Delgado   Age:  75 y.o.  :  1945                                          MRN:  [id number]         Date:  2019      Surgeon: Cuong Barron):  Amina Mendoza MD    Procedure: Procedure(s):  KNEE TOTAL ARTHROPLASTY    Medications prior to admission:   Prior to Admission medications    Medication Sig Start Date End Date Taking? Authorizing Provider   losartan-hydrochlorothiazide (HYZAAR) 100-12.5 MG per tablet TAKE 1 TABLET BY MOUTH EVERY DAY 3/10/19   Historical Provider, MD   meloxicam (MOBIC) 15 MG tablet Take 15 mg by mouth daily    Historical Provider, MD   amLODIPine (NORVASC) 5 MG tablet Take 2.5 mg by mouth daily     Historical Provider, MD   levothyroxine (SYNTHROID) 50 MCG tablet Take of the 100 mcg Synthroid - use two 50s  Patient taking differently: Take 100 mcg by mouth Daily Take of the 100 mcg Synthroid - use two 50s 10/22/17   Dayo Small MD   aspirin 81 MG EC tablet Take 81 mg by mouth daily    Historical Provider, MD   SIMVASTATIN PO Take 20 mg by mouth nightly     Historical Provider, MD       Current medications:    No current facility-administered medications for this visit. No current outpatient medications on file. Facility-Administered Medications Ordered in Other Visits   Medication Dose Route Frequency Provider Last Rate Last Dose    lactated ringers infusion   Intravenous Continuous JEANNA Hurtado CRNA        lidocaine PF 1 % injection 1 mL  1 mL Intradermal Once PRN JEANNA Hurtado CRNA           Allergies:     Allergies   Allergen Reactions    Ciprofloxacin     Morphine      Very Restless       Problem List:    Patient Active Problem List   Diagnosis Code    Irritable bowel syndrome with diarrhea K58.0    Diverticulosis K57.90    History of adenomatous polyp of colon Z86.010    Primary osteoarthritis of left knee M17.12    Essential hypertension I10    Hypothyroidism E03.9    Hyperlipidemia E78.5    Gastroesophageal reflux disease K21.9    Diverticulitis K57.92    Insomnia G47.00    History of colonic diverticulitis Z87.19       Past Medical History:        Diagnosis Date    Acute renal failure ([facility] [address] 07/29/2016    discharged 8/1/16; \"due to strong antibiotic and being dehydrated\"    Arthritis     Breast cancer ([facility] [address]     surgery only    Cancer ([facility] [address]     Breast cancer    Colon polyps     Diverticulitis     GERD (gastroesophageal reflux disease)     Hyperlipidemia     Hypertension     Joint pain     Thyroid condition        Past Surgical History:        Procedure Laterality Date    ABDOMEN SURGERY      BLADDER REPAIR      BLEPHAROPLASTY      BREAST ENHANCEMENT SURGERY      BREAST SURGERY  1984    [place] Mastectomy    BUNIONECTOMY      [facility]    COLONOSCOPY  04/29/2014    TA x 2, HP (5 yr)    EYE SURGERY      Bilateral cataracts    HYSTERECTOMY      partial    KNEE SURGERY Left 08/17/2016    OVARY REMOVAL      PATELLA SURGERY      THYROIDECTOMY, PARTIAL      TONSILLECTOMY AND ADENOIDECTOMY      TOTAL KNEE ARTHROPLASTY Left 8/17/2016    KNEE TOTAL ARTHROPLASTY performed by Seferino Keenan MD at [place] [address] History:    Social History     Tobacco Use    Smoking status: Never Smoker    Smokeless tobacco: Never Used   Substance Use Topics    Alcohol use: No                                Counseling given: Not Answered      Vital Signs (Current): There were no vitals filed for this visit.                                            BP Readings from Last 3 Encounters:   05/21/19 139/85   05/06/19 138/81   08/23/18 (!) 140/69       NPO Status:                                                                                 BMI:   Wt Readings from Last 3 Encounters:   05/21/19 190 lb (86.2 kg)   05/06/19 193 lb (87.5 kg)   08/23/18 185 lb (83.9 kg)     There is no height or weight on file to calculate BMI.    CBC:   Lab Results   Component Value Date    WBC 13.4 08/23/2018    RBC 4.88 08/23/2018    HGB 13.7 08/23/2018    HCT 42.4 08/23/2018    MCV 86.9 08/23/2018    RDW 14.1 08/23/2018     08/23/2018       CMP:   Lab Results   Component Value Date     08/23/2018    K 4.4 08/23/2018    CL 97 08/23/2018    CO2 24 08/23/2018    BUN 26 08/23/2018    CREATININE 1.4 08/23/2018    LABGLOM 37 08/23/2018    GLUCOSE 109 08/23/2018    PROT 7.6 08/23/2018    CALCIUM 10.1 08/23/2018    BILITOT 0.5 08/23/2018    ALKPHOS 72 08/23/2018    AST 20 08/23/2018    ALT 18 08/23/2018       POC Tests: No results for input(s): POCGLU, POCNA, POCK, POCCL, POCBUN, POCHEMO, POCHCT in the last 72 hours. Coags:   Lab Results   Component Value Date    PROTIME 14.1 08/01/2016    INR 1.09 08/01/2016    APTT 33.3 08/01/2016       HCG (If Applicable): No results found for: PREGTESTUR, PREGSERUM, HCG, HCGQUANT     ABGs: No results found for: PHART, PO2ART, YPX5OOH, QUH1UGP, BEART, F3JWCSOW     Type & Screen (If Applicable):  No results found for: [facility]    Anesthesia Evaluation  Patient summary reviewed and Nursing notes reviewed no history of anesthetic complications:   Airway: Mallampati: II  TM distance: >3 FB   Neck ROM: full  Mouth opening: > = 3 FB Dental: normal exam         Pulmonary:normal exam        (-) COPD, asthma and sleep apnea                           Cardiovascular:    (+) hypertension:, hyperlipidemia    (-) pacemaker, valvular problems/murmurs, past MI, CAD, CABG/stent and dysrhythmias       Beta Blocker:  Not on Beta Blocker         Neuro/Psych:   Negative Neuro/Psych ROS              GI/Hepatic/Renal:   (+) GERD:, renal disease: CRI, bowel prep,           Endo/Other:    (+) hypothyroidism::., malignancy/cancer (h/o Breast Cancer). (-) blood dyscrasia                ROS comment: Anemia Abdominal:           Vascular: negative vascular ROS.                                          Anesthesia Plan      general     ASA 2 Induction: intravenous. Anesthetic plan and risks discussed with patient.                       JEANNA Dietrich - CRNA   5/21/2019

## 2019-05-31 ENCOUNTER — TELEPHONE (OUTPATIENT)
Dept: GASTROENTEROLOGY | Age: 74
End: 2019-05-31

## 2019-05-31 NOTE — TELEPHONE ENCOUNTER
[address] [place] [address]  Department of Pathology  FINAL SURGICAL PATHOLOGY REPORT  Patient Name: Karlis Felty          Accession No:  [id number]   Age Sex:   1945    73 Y F        Pt Type: O     KLLAB                                             Location:  Account #     [de-identified]                 Collected:     2019  Med Rec #      [id number]                    Received:      2019  Attend Phys:                               Completed:     2019  Perform Phys: Judson Moser    FINAL DIAGNOSIS:    Colon, ascending colonic biopsy: Tubular adenoma, negative for evidence  of high-grade dysplasia.   CBG/CBG    Last OV BG aprn 19. CLN completed with  19. Patient called today asking for recent path results, see the above report I read to the patient, she voiced appreciation and understanding. I did tell the patient she will still get a letter and a recall recommendation.   lynette

## 2019-07-03 ENCOUNTER — HOSPITAL ENCOUNTER (OUTPATIENT)
Dept: MAMMOGRAPHY | Facility: HOSPITAL | Age: 74
Discharge: HOME | End: 2019-07-03
Admitting: INTERNAL MEDICINE

## 2019-07-03 DIAGNOSIS — Z12.31 ENCOUNTER FOR SCREENING MAMMOGRAM FOR MALIGNANT NEOPLASM OF BREAST: ICD-10-CM

## 2019-07-03 PROCEDURE — 77063 BREAST TOMOSYNTHESIS BI: CPT

## 2019-07-03 PROCEDURE — 77067 SCR MAMMO BI INCL CAD: CPT

## 2019-10-14 ENCOUNTER — TELEPHONE (OUTPATIENT)
Dept: VASCULAR SURGERY | Facility: CLINIC | Age: 74
End: 2019-10-14

## 2019-10-14 DIAGNOSIS — I65.23 BILATERAL CAROTID ARTERY STENOSIS: Primary | ICD-10-CM

## 2019-10-14 NOTE — TELEPHONE ENCOUNTER
Spoke with Mrs Conner reminding her of her appointments for Tuesday, October 15th, 2019. Reminded [facility]30 [facility] for testing and follow up afterwards at 10 am with Dr Bentley. Mrs Conner confirmed she would be here.

## 2019-10-15 ENCOUNTER — HOSPITAL ENCOUNTER (OUTPATIENT)
Dept: ULTRASOUND IMAGING | Facility: HOSPITAL | Age: 74
Discharge: HOME | End: 2019-10-15
Admitting: NURSE PRACTITIONER

## 2019-10-15 ENCOUNTER — OFFICE VISIT (OUTPATIENT)
Dept: VASCULAR SURGERY | Facility: CLINIC | Age: 74
End: 2019-10-15

## 2019-10-15 VITALS
SYSTOLIC BLOOD PRESSURE: 136 MMHG | BODY MASS INDEX: 33.22 KG/M2 | DIASTOLIC BLOOD PRESSURE: 84 MMHG | OXYGEN SATURATION: 98 % | HEART RATE: 65 BPM | HEIGHT: 64 IN | WEIGHT: 194.6 LBS

## 2019-10-15 DIAGNOSIS — E78.2 MIXED HYPERLIPIDEMIA: ICD-10-CM

## 2019-10-15 DIAGNOSIS — I10 ESSENTIAL HYPERTENSION: ICD-10-CM

## 2019-10-15 DIAGNOSIS — I65.23 BILATERAL CAROTID ARTERY STENOSIS: ICD-10-CM

## 2019-10-15 DIAGNOSIS — I87.2 VENOUS INSUFFICIENCY: ICD-10-CM

## 2019-10-15 DIAGNOSIS — I65.23 BILATERAL CAROTID ARTERY STENOSIS: Primary | ICD-10-CM

## 2019-10-15 PROCEDURE — 99214 OFFICE O/P EST MOD 30 MIN: CPT | Performed by: NURSE PRACTITIONER

## 2019-10-15 PROCEDURE — 93880 EXTRACRANIAL BILAT STUDY: CPT | Performed by: SURGERY

## 2019-10-15 PROCEDURE — 93880 EXTRACRANIAL BILAT STUDY: CPT

## 2019-10-15 RX ORDER — CYCLOBENZAPRINE HCL 5 MG
5 TABLET ORAL AS NEEDED
Refills: 1 | COMMUNITY
Start: 2019-10-07 | End: 2020-10-01 | Stop reason: ALTCHOICE

## 2019-10-15 NOTE — PROGRESS NOTES
10/15/2019       Mich Cedillo MD  [address]  [place] [address]    Laura Conner  1945    Chief Complaint   Patient presents with   • Follow-up     1 Year Follow UP For Bilateral Carotid Artery Stenosis. Test 620938 US pad carotid bilateral. Patient denies any stroke like symptoms.    • other     Patient states at night when she gets in bed bilateral legs feel heavy and she feels as though she needs to constantly move them.        Dear Mich Cedillo MD       HPI  I had the pleasure of seeing your patient Laura Conner in the office today.    As you recall, Laura Conner is a 74 y.o.  female who we are following for venous insufficiency and asymptomatic carotid occlusive disease.  She has had complaints of leg swelling since about February per recall.  She does have some spider varicosities and reported cramping at night with heaviness and tiredness.  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  She did undergo a left lower extremity radiofrequency ablation on 11/5/18 and most recently a right lower extremity radiofrequency ablation on 11/19/18 and is now back for follow up.  She does states her swelling improved but has not been wearing compression stockings.   She is maintained on Zocor and aspirin.  She did have noninasive testing performed, which I did review in office.         Review of Systems   Constitutional: Negative.    HENT: Negative.    Eyes: Negative.    Respiratory: Negative.    Cardiovascular: Positive for leg swelling (with cramping to BLE).   Gastrointestinal: Negative.    Endocrine: Negative.    Genitourinary: Negative.    Musculoskeletal: Positive for neck pain (intermittent muscle spasm right posterior neck).   Skin: Negative.    Allergic/Immunologic: Negative.    Neurological: Negative.    Hematological: Negative.    Psychiatric/Behavioral: The patient is nervous/anxious.        /84 (BP Location: Right arm, Patient Position: Sitting, Cuff  "Size: Adult)   Pulse 65   Ht 162.6 cm (64\")   Wt 88.3 kg (194 lb 9.6 oz)   LMP  (LMP Unknown)   SpO2 98%   BMI 33.40 kg/m²   Physical Exam   Constitutional: She is oriented to person, place, and time. She appears well-developed and well-nourished. No distress.   HENT:   Head: Normocephalic and atraumatic.   Mouth/Throat: Oropharynx is clear and moist.   Eyes: Pupils are equal, round, and reactive to light. No scleral icterus.   Neck: Normal range of motion. Neck supple. No JVD present. Carotid bruit is not present. No thyromegaly present.   Cardiovascular: Normal rate, regular rhythm, S2 normal, normal heart sounds, intact distal pulses and normal pulses. Exam reveals no gallop and no friction rub.   No murmur heard.  Pulmonary/Chest: Effort normal and breath sounds normal.   Abdominal: Soft. Normal aorta and bowel sounds are normal. There is no hepatosplenomegaly.   Musculoskeletal: Normal range of motion. She exhibits edema (to bilateral lower extremities).   Swelling to left medial knee with tenderness   Neurological: She is alert and oriented to person, place, and time. No cranial nerve deficit.   Skin: Skin is warm and dry. She is not diaphoretic.   Psychiatric: She has a normal mood and affect. Her behavior is normal. Judgment and thought content normal.   Nursing note and vitals reviewed.    Diagnostic Data:    Us Carotid Bilateral    Result Date: 10/15/2019  Narrative: History: Carotid occlusive disease      Impression: Impression: 1. There is less than 50% stenosis of the right internal carotid artery. 2. There is less than 50% stenosis of the left internal carotid artery. 3. Antegrade flow is demonstrated in bilateral vertebral arteries.  Comments: Bilateral carotid vertebral arterial duplex scan was performed.  Grayscale imaging shows intimal thickening and calcified elements at the carotid bifurcation. The right internal carotid artery peak systolic velocity is 64.4 cm/sec. The end-diastolic " velocity is 14.9 cm/sec. The right ICA/CCA ratio is approximately 1.11 . These findings correlate with less than 50% stenosis of the right internal carotid artery.  Grayscale imaging shows intimal thickening and calcified elements at the carotid bifurcation. The left internal carotid artery peak systolic velocity is 112 cm/sec. The end-diastolic velocity is 36.9 cm/sec. The left ICA/CCA ratio is approximately 1.23 . These findings correlate with less than 50% stenosis of the left internal carotid artery.  Antegrade flow is demonstrated in bilateral vertebral arteries.  This report was finalized on 10/15/2019 14:11 by Dr. Andrew Steele MD.      Patient Active Problem List   Diagnosis   • Diverticulitis   • Diverticulosis   • Gastroesophageal reflux disease   • History of colon polyps   • Hyperlipidemia   • Hypothyroidism   • Insomnia   • Irritable bowel syndrome with diarrhea   • Primary osteoarthritis of left knee   • BMI 33.0-33.9,adult   • Venous insufficiency   • Hypertension   • Hypercholesteremia   • Hypertriglyceridemia         ICD-10-CM ICD-9-CM   1. Bilateral carotid artery stenosis I65.23 433.10     433.30   2. Mixed hyperlipidemia E78.2 272.2   3. Venous insufficiency I87.2 459.81   4. Essential hypertension I10 401.9       Plan: After thoroughly evaluating Laura Conner, I the best course of action is to remain conservative from vascular surgery standpoint.  I did review her testing which shows less than 50% carotid stenosis bilaterally.  She does have some complaints of heaviness and mild leg swelling however does not wear compression stockings.  I encouraged her to begin wearing compression on a daily basis.  This should help with some of her symptoms.  I did discuss vascular risk factors as they pertain to the progression of vascular disease including controlling her hypertension and hyperlipidemia, which are stable.  We will see her back in 1 year with repeat noninvasive testing for continued  surveillance, including a carotid duplex.  Body mass index is 33.4 kg/m². BMI chart given.  The patient can continue taking her current medication regimen as previously planned.  This was all discussed in full with complete understanding.    Thank you for allowing me to participate in the care of your patient.  Please do not hesitate with any questions or concerns.  I will keep you aware of any further encounters with Laura Conner.        Sincerely yours,         RADHIKA Bryan Danny Neale, MD

## 2020-02-23 ENCOUNTER — HOSPITAL ENCOUNTER (EMERGENCY)
Facility: HOSPITAL | Age: 75
Discharge: HOME | End: 2020-02-23
Attending: EMERGENCY MEDICINE | Admitting: EMERGENCY MEDICINE

## 2020-02-23 ENCOUNTER — NURSE TRIAGE (OUTPATIENT)
Dept: CALL CENTER | Facility: HOSPITAL | Age: 75
End: 2020-02-23

## 2020-02-23 ENCOUNTER — APPOINTMENT (OUTPATIENT)
Dept: GENERAL RADIOLOGY | Facility: HOSPITAL | Age: 75
End: 2020-02-23

## 2020-02-23 VITALS
TEMPERATURE: 97.9 F | WEIGHT: 200 LBS | HEART RATE: 42 BPM | HEIGHT: 64 IN | OXYGEN SATURATION: 92 % | BODY MASS INDEX: 34.15 KG/M2 | RESPIRATION RATE: 17 BRPM | SYSTOLIC BLOOD PRESSURE: 138 MMHG | DIASTOLIC BLOOD PRESSURE: 91 MMHG

## 2020-02-23 DIAGNOSIS — I10 CHRONIC HYPERTENSION: Primary | ICD-10-CM

## 2020-02-23 DIAGNOSIS — R00.1 BRADYCARDIA: ICD-10-CM

## 2020-02-23 DIAGNOSIS — R53.1 GENERALIZED WEAKNESS: ICD-10-CM

## 2020-02-23 DIAGNOSIS — R06.00 DYSPNEA, UNSPECIFIED TYPE: ICD-10-CM

## 2020-02-23 LAB
ALBUMIN SERPL-MCNC: 4.6 G/DL (ref 3.5–5.2)
ALBUMIN/GLOB SERPL: 1.4 G/DL
ALP SERPL-CCNC: 56 U/L (ref 39–117)
ALT SERPL W P-5'-P-CCNC: 16 U/L (ref 1–33)
ANION GAP SERPL CALCULATED.3IONS-SCNC: 13 MMOL/L (ref 5–15)
AST SERPL-CCNC: 19 U/L (ref 1–32)
BASOPHILS # BLD AUTO: 0.05 10*3/MM3 (ref 0–0.2)
BASOPHILS NFR BLD AUTO: 0.7 % (ref 0–1.5)
BILIRUB SERPL-MCNC: 0.4 MG/DL (ref 0.2–1.2)
BILIRUB UR QL STRIP: NEGATIVE
BUN BLD-MCNC: 13 MG/DL (ref 8–23)
BUN/CREAT SERPL: 16 (ref 7–25)
CALCIUM SPEC-SCNC: 9.3 MG/DL (ref 8.6–10.5)
CHLORIDE SERPL-SCNC: 103 MMOL/L (ref 98–107)
CLARITY UR: CLEAR
CO2 SERPL-SCNC: 24 MMOL/L (ref 22–29)
COLOR UR: YELLOW
CREAT BLD-MCNC: 0.81 MG/DL (ref 0.57–1)
D DIMER PPP FEU-MCNC: 0.39 MG/L (FEU) (ref 0–0.5)
DEPRECATED RDW RBC AUTO: 42.5 FL (ref 37–54)
EOSINOPHIL # BLD AUTO: 0.77 10*3/MM3 (ref 0–0.4)
EOSINOPHIL NFR BLD AUTO: 11.2 % (ref 0.3–6.2)
ERYTHROCYTE [DISTWIDTH] IN BLOOD BY AUTOMATED COUNT: 13.8 % (ref 12.3–15.4)
GFR SERPL CREATININE-BSD FRML MDRD: 69 ML/MIN/1.73
GLOBULIN UR ELPH-MCNC: 3.2 GM/DL
GLUCOSE BLD-MCNC: 116 MG/DL (ref 65–99)
GLUCOSE UR STRIP-MCNC: NEGATIVE MG/DL
HCT VFR BLD AUTO: 37.7 % (ref 34–46.6)
HGB BLD-MCNC: 12.7 G/DL (ref 12–15.9)
HGB UR QL STRIP.AUTO: NEGATIVE
HOLD SPECIMEN: NORMAL
HOLD SPECIMEN: NORMAL
IMM GRANULOCYTES # BLD AUTO: 0.02 10*3/MM3 (ref 0–0.05)
IMM GRANULOCYTES NFR BLD AUTO: 0.3 % (ref 0–0.5)
KETONES UR QL STRIP: NEGATIVE
LEUKOCYTE ESTERASE UR QL STRIP.AUTO: NEGATIVE
LYMPHOCYTES # BLD AUTO: 1.87 10*3/MM3 (ref 0.7–3.1)
LYMPHOCYTES NFR BLD AUTO: 27.1 % (ref 19.6–45.3)
MCH RBC QN AUTO: 28.6 PG (ref 26.6–33)
MCHC RBC AUTO-ENTMCNC: 33.7 G/DL (ref 31.5–35.7)
MCV RBC AUTO: 84.9 FL (ref 79–97)
MONOCYTES # BLD AUTO: 0.52 10*3/MM3 (ref 0.1–0.9)
MONOCYTES NFR BLD AUTO: 7.5 % (ref 5–12)
NEUTROPHILS # BLD AUTO: 3.66 10*3/MM3 (ref 1.7–7)
NEUTROPHILS NFR BLD AUTO: 53.2 % (ref 42.7–76)
NITRITE UR QL STRIP: NEGATIVE
NRBC BLD AUTO-RTO: 0 /100 WBC (ref 0–0.2)
NT-PROBNP SERPL-MCNC: 771 PG/ML (ref 5–1800)
PH UR STRIP.AUTO: 7.5 [PH] (ref 5–8)
PLATELET # BLD AUTO: 266 10*3/MM3 (ref 140–450)
PMV BLD AUTO: 10.7 FL (ref 6–12)
POTASSIUM BLD-SCNC: 4.2 MMOL/L (ref 3.5–5.2)
PROT SERPL-MCNC: 7.8 G/DL (ref 6–8.5)
PROT UR QL STRIP: NEGATIVE
RBC # BLD AUTO: 4.44 10*6/MM3 (ref 3.77–5.28)
SODIUM BLD-SCNC: 140 MMOL/L (ref 136–145)
SP GR UR STRIP: 1.01 (ref 1–1.03)
TROPONIN T SERPL-MCNC: <0.01 NG/ML (ref 0–0.03)
TROPONIN T SERPL-MCNC: <0.01 NG/ML (ref 0–0.03)
UROBILINOGEN UR QL STRIP: NORMAL
WBC NRBC COR # BLD: 6.89 10*3/MM3 (ref 3.4–10.8)
WHOLE BLOOD HOLD SPECIMEN: NORMAL
WHOLE BLOOD HOLD SPECIMEN: NORMAL

## 2020-02-23 PROCEDURE — 85025 COMPLETE CBC W/AUTO DIFF WBC: CPT | Performed by: EMERGENCY MEDICINE

## 2020-02-23 PROCEDURE — 71045 X-RAY EXAM CHEST 1 VIEW: CPT

## 2020-02-23 PROCEDURE — 83880 ASSAY OF NATRIURETIC PEPTIDE: CPT | Performed by: EMERGENCY MEDICINE

## 2020-02-23 PROCEDURE — 84484 ASSAY OF TROPONIN QUANT: CPT | Performed by: EMERGENCY MEDICINE

## 2020-02-23 PROCEDURE — 80053 COMPREHEN METABOLIC PANEL: CPT | Performed by: EMERGENCY MEDICINE

## 2020-02-23 PROCEDURE — 99284 EMERGENCY DEPT VISIT MOD MDM: CPT

## 2020-02-23 PROCEDURE — 81003 URINALYSIS AUTO W/O SCOPE: CPT | Performed by: EMERGENCY MEDICINE

## 2020-02-23 PROCEDURE — 93010 ELECTROCARDIOGRAM REPORT: CPT | Performed by: INTERNAL MEDICINE

## 2020-02-23 PROCEDURE — 85379 FIBRIN DEGRADATION QUANT: CPT | Performed by: EMERGENCY MEDICINE

## 2020-02-23 PROCEDURE — 93005 ELECTROCARDIOGRAM TRACING: CPT | Performed by: EMERGENCY MEDICINE

## 2020-02-23 PROCEDURE — 36415 COLL VENOUS BLD VENIPUNCTURE: CPT

## 2020-02-23 RX ORDER — SODIUM CHLORIDE 0.9 % (FLUSH) 0.9 %
10 SYRINGE (ML) INJECTION AS NEEDED
Status: DISCONTINUED | OUTPATIENT
Start: 2020-02-23 | End: 2020-02-23 | Stop reason: HOSPADM

## 2020-02-23 RX ORDER — CLONIDINE HYDROCHLORIDE 0.1 MG/1
0.1 TABLET ORAL ONCE
Status: COMPLETED | OUTPATIENT
Start: 2020-02-23 | End: 2020-02-23

## 2020-02-23 RX ORDER — HYDRALAZINE HYDROCHLORIDE 20 MG/ML
20 INJECTION INTRAMUSCULAR; INTRAVENOUS ONCE
Status: DISCONTINUED | OUTPATIENT
Start: 2020-02-23 | End: 2020-02-23 | Stop reason: HOSPADM

## 2020-02-23 RX ORDER — CLONIDINE HYDROCHLORIDE 0.1 MG/1
0.1 TABLET ORAL ONCE
Status: DISCONTINUED | OUTPATIENT
Start: 2020-02-23 | End: 2020-02-23 | Stop reason: HOSPADM

## 2020-02-23 RX ADMIN — CLONIDINE HYDROCHLORIDE 0.1 MG: 0.1 TABLET ORAL at 10:07

## 2020-02-23 NOTE — TELEPHONE ENCOUNTER
"She has been watching her BP since November. Her heart rate this am is 46, and she is sob- 191/81. She is on Norvasc 2.5mg, Coreg 25mg twice a day , Benicar 40 mg - She has not taken her BP pills this am. Advised to go to ED. She would like her MD notified. She wants to wait til Monday. Advised after speaking with Barak POWELL to go to Ed. She agrees.     Reason for Disposition  • [1] Follow-up call from patient regarding patient's clinical status AND [2] information NON-URGENT    Additional Information  • Negative: Lab calling with strep throat test results and triager can call in prescription  • Negative: Lab calling with urinalysis test results and triager can call in prescription  • Negative: Medication questions  • Negative: ED call to PCP  • Negative: Physician call to PCP  • Negative: Call about patient who is currently hospitalized  • Negative: Lab or radiology calling with CRITICAL test results  • Negative: [1] Prescription not at pharmacy AND [2] was prescribed today by PCP  • Negative: [1] Follow-up call from patient regarding patient's clinical status AND [2] information urgent  • Negative: [1] Caller requests to speak ONLY to PCP AND [2] URGENT question  • Negative: [1] Caller requests to speak to PCP now AND [2] won't tell us reason for call  (Exception: if 10 pm to 6 am, caller must first discuss reason for the call)  • Negative: Notification of hospital admission  • Negative: Notification of death  • Negative: Caller requesting lab results  • Negative: Lab or radiology calling with test results  • Negative: [1] Caller requests to speak ONLY to PCP AND [2] NON-URGENT question  • Negative: Caller requesting an appointment, triage offered and declined  • Negative: [1] Other NON-URGENT information for PCP AND [2] does not require PCP response    Answer Assessment - Initial Assessment Questions  1. REASON FOR CALL or QUESTION: \"What is your reason for calling today?\" or \"How can I best  help you?\" or \"What " "question do you have that I can help answer?\"      Her heart rate is 46 and /81 and sob   2. CALLER: Document the source of call. (e.g., laboratory, patient).      Patient    Protocols used: PCP CALL - NO TRIAGE-ADULT-      "

## 2020-02-23 NOTE — ED PROVIDER NOTES
Subjective   Patient is a 74-year-old female who presents to the ER with shortness of air.  Patient states she has a history of hypertension.  She sees Dr. Cedillo and was started on Coreg in December 2019.  Patient states that since starting Coreg she has had bradycardia, generalized weakness, and shortness of air.  Patient states that over the last week she has noticed worsening generalized weakness and she has had worsening shortness of air for the last 2 to 3 days.  Patient states that this morning she did have one episode of chest discomfort.  Patient states it was located over the left side with no radiation.  The discomfort lasted for only a few seconds and then resolved on its own.  She has had no pain since that time.  She denies any fever, abdominal pain, nausea vomiting diarrhea, urinary changes, cough, neurologic changes, leg pain or swelling.  Patient states her shortness of air is worse with exertion.  He denies any recent cardiac catheterizations or stress test.          Review of Systems   HENT: Negative.    Eyes: Negative.    Respiratory: Positive for shortness of breath.    Cardiovascular: Positive for chest pain.   Gastrointestinal: Negative.    Endocrine: Negative.    Genitourinary: Negative.    Musculoskeletal: Negative.    Skin: Negative.    Allergic/Immunologic: Negative.    Neurological: Positive for weakness.   Hematological: Negative.    Psychiatric/Behavioral: Negative.    All other systems reviewed and are negative.      Past Medical History:   Diagnosis Date   • Breast cancer (CMS/HCC) 1984    left breast cancer   • Gout    • H/O acute renal failure     due to antibiotic   • Hypercholesteremia    • Hypertension    • Hypertriglyceridemia    • Hypothyroid    • Kidney cyst, acquired    • Leg edema    • Osteoarthritis    • Venous insufficiency        Allergies   Allergen Reactions   • Ciprofloxacin Other (See Comments)     Caused patient to go into kidney failure   • Morphine And Related  Anxiety     Patient states that she couldn't sit still       Past Surgical History:   Procedure Laterality Date   • AUGMENTATION MAMMAPLASTY  1984    jean-pierre mastectomy, left br cancer 1984 w/ jean-pierre reconstruction   • AUGMENTATION MAMMAPLASTY      replaced in 2007.   • BLADDER SUSPENSION     • BREAST BIOPSY     • BREAST IMPLANT SURGERY     • CATARACT EXTRACTION, BILATERAL     • FOOT SURGERY     • HYSTERECTOMY     • MASTECTOMY Bilateral 1984    lymph nodes removed on left   • OOPHORECTOMY     • THYROIDECTOMY, PARTIAL     • TONSILLECTOMY     • TOTAL KNEE ARTHROPLASTY Left    • VARICOSE VEIN SURGERY Left 11/5/2018    Procedure: LEFT SAPHENOUS VEIN RADIO FREQUENCY ABLATION;  Surgeon: Maco Bentley DO;  Location: [facility] HYBRID OR 12;  Service: Vascular   • VARICOSE VEIN SURGERY Right 11/19/2018    Procedure: RIGHT SAPHENOUS VEIN RADIO FREQUENCY ABLATION;  Surgeon: Maco Bentley DO;  Location: [facility] HYBRID OR 12;  Service: Vascular       Family History   Problem Relation Age of Onset   • Breast cancer Sister    • Bone cancer Sister    • Stroke Sister    • Heart failure Mother    • Diabetes Mother    • Arthritis Mother    • Heart disease Father    • Stroke Father    • Prostate cancer Father    • Aneurysm Father    • Lung cancer Brother    • Diabetes Brother    • Heart disease Sister    • Parkinsonism Sister    • Stroke Paternal Grandmother        Social History     Socioeconomic History   • Marital status:      Spouse name: Not on file   • Number of children: Not on file   • Years of education: Not on file   • Highest education level: Not on file   Tobacco Use   • Smoking status: Never Smoker   • Smokeless tobacco: Never Used   Substance and Sexual Activity   • Alcohol use: No   • Drug use: No   • Sexual activity: Defer           Objective   Physical Exam   Constitutional: She is oriented to person, place, and time. She appears well-developed and well-nourished.   HENT:   Head: Normocephalic and atraumatic.    Eyes: Pupils are equal, round, and reactive to light. Conjunctivae are normal.   Neck: Normal range of motion.   Cardiovascular: Regular rhythm and normal heart sounds. Bradycardia present.   Pulmonary/Chest: Effort normal and breath sounds normal.   Abdominal: Soft. There is no tenderness.   Musculoskeletal: Normal range of motion. She exhibits no edema or deformity.   Neurological: She is alert and oriented to person, place, and time. She has normal strength. No cranial nerve deficit or sensory deficit.   Skin: Skin is warm.   Psychiatric: She has a normal mood and affect. Her behavior is normal.   Nursing note and vitals reviewed.      Procedures           ED Course      ECG: Sinus bradycardia with a rate of 44, no acute ischemia or infarction  EKG 2: Sinus bradycardia with a rate of 44, no acute ischemia or infarction    Patient's blood pressure was elevated.  She was given clonidine.  Blood Pressure normalized.    Lab Results (last 24 hours)     Procedure Component Value Units Date/Time    CBC & Differential [[id number]] Collected:  02/23/20 0928    Specimen:  Blood Updated:  02/23/20 0945    Narrative:       The following orders were created for panel order CBC & Differential.  Procedure                               Abnormality         Status                     ---------                               -----------         ------                     CBC Auto Differential[[id number]]        Abnormal            Final result                 Please view results for these tests on the individual orders.    Comprehensive Metabolic Panel [[id number]]  (Abnormal) Collected:  02/23/20 0928    Specimen:  Blood Updated:  02/23/20 0957     Glucose 116 mg/dL      BUN 13 mg/dL      Creatinine 0.81 mg/dL      Sodium 140 mmol/L      Potassium 4.2 mmol/L      Chloride 103 mmol/L      CO2 24.0 mmol/L      Calcium 9.3 mg/dL      Total Protein 7.8 g/dL      Albumin 4.60 g/dL      ALT (SGPT) 16 U/L      AST (SGOT) 19 U/L       Alkaline Phosphatase 56 U/L      Total Bilirubin 0.4 mg/dL      eGFR Non African Amer 69 mL/min/1.73      Globulin 3.2 gm/dL      A/G Ratio 1.4 g/dL      BUN/Creatinine Ratio 16.0     Anion Gap 13.0 mmol/L     Narrative:       GFR Normal >60  Chronic Kidney Disease <60  Kidney Failure <15      Troponin [[id number]]  (Normal) Collected:  02/23/20 0928    Specimen:  Blood Updated:  02/23/20 0957     Troponin T <0.010 ng/mL     Narrative:       Troponin T Reference Range:  <= 0.03 ng/mL-   Negative for AMI  >0.03 ng/mL-     Abnormal for myocardial necrosis.  Clinicians would have to utilize clinical acumen, EKG, Troponin and serial changes to determine if it is an Acute Myocardial Infarction or myocardial injury due to an underlying chronic condition.       Results may be falsely decreased if patient taking Biotin.      D-dimer, Quantitative [[id number]]  (Normal) Collected:  02/23/20 0928    Specimen:  Blood Updated:  02/23/20 0952     D-Dimer, Quantitative 0.39 mg/L (FEU)     Narrative:       Reference Range is 0-0.50 mg/L FEU. However, results <0.50 mg/L FEU tends to rule out DVT or PE. Results >0.50 mg/L FEU are not useful in predicting absence or presence of DVT or PE.      BNP [[id number]]  (Normal) Collected:  02/23/20 0928    Specimen:  Blood Updated:  02/23/20 0957     proBNP 771.0 pg/mL     Narrative:       Among patients with dyspnea, NT-proBNP is highly sensitive for the detection of acute congestive heart failure. In addition NT-proBNP of <300 pg/ml effectively rules out acute congestive heart failure with 99% negative predictive value.    Results may be falsely decreased if patient taking Biotin.      CBC Auto Differential [[id number]]  (Abnormal) Collected:  02/23/20 0928    Specimen:  Blood Updated:  02/23/20 0945     WBC 6.89 10*3/mm3      RBC 4.44 10*6/mm3      Hemoglobin 12.7 g/dL      Hematocrit 37.7 %      MCV 84.9 fL      MCH 28.6 pg      MCHC 33.7 g/dL      RDW 13.8 %      RDW-SD 42.5 fl      MPV  10.7 fL      Platelets 266 10*3/mm3      Neutrophil % 53.2 %      Lymphocyte % 27.1 %      Monocyte % 7.5 %      Eosinophil % 11.2 %      Basophil % 0.7 %      Immature Grans % 0.3 %      Neutrophils, Absolute 3.66 10*3/mm3      Lymphocytes, Absolute 1.87 10*3/mm3      Monocytes, Absolute 0.52 10*3/mm3      Eosinophils, Absolute 0.77 10*3/mm3      Basophils, Absolute 0.05 10*3/mm3      Immature Grans, Absolute 0.02 10*3/mm3      nRBC 0.0 /100 WBC     Urinalysis With Culture If Indicated - Urine, Clean Catch [[id number]]  (Normal) Collected:  02/23/20 1006    Specimen:  Urine, Clean Catch Updated:  02/23/20 1020     Color, UA Yellow     Appearance, UA Clear     pH, UA 7.5     Specific Gravity, UA 1.006     Glucose, UA Negative     Ketones, UA Negative     Bilirubin, UA Negative     Blood, UA Negative     Protein, UA Negative     Leuk Esterase, UA Negative     Nitrite, UA Negative     Urobilinogen, UA 0.2 E.U./dL    Narrative:       Urine microscopic not indicated.    Troponin [[id number]]  (Normal) Collected:  02/23/20 1242    Specimen:  Blood Updated:  02/23/20 1304     Troponin T <0.010 ng/mL     Narrative:       Troponin T Reference Range:  <= 0.03 ng/mL-   Negative for AMI  >0.03 ng/mL-     Abnormal for myocardial necrosis.  Clinicians would have to utilize clinical acumen, EKG, Troponin and serial changes to determine if it is an Acute Myocardial Infarction or myocardial injury due to an underlying chronic condition.       Results may be falsely decreased if patient taking Biotin.          XR Chest 1 View   Final Result   1. Borderline cardiomegaly. Otherwise, no acute process.           This report was finalized on 02/23/2020 09:58 by Dr Chilo Morales, .        Labs including troponin x2, d-dimer and BNP were all normal.  Chest x-ray showed borderline cardiomegaly but was otherwise negative.  All the patient's symptoms began when she started Coreg.  I feel the patient is most likely not tolerating the  beta-blocker.  She meets no indication for a pacemaker at this moment.  I discussed the case with Dr. Cedillo.  Patient is feeling better and wants to be discharged home.  She will follow-up with Dr. Cedillo later this week for medication evaluation.  She is return the ER immediately for any chest pain, shortness of air or other concerns.  Patient is agreeable.    HEART: 2                                     MDM    Final diagnoses:   Chronic hypertension   Bradycardia   Dyspnea, unspecified type   Generalized weakness            Jodi Espitia MD  02/23/20 1323       Jodi Espitia MD  02/23/20 9959

## 2020-05-18 ENCOUNTER — TRANSCRIBE ORDERS (OUTPATIENT)
Dept: ADMINISTRATIVE | Facility: HOSPITAL | Age: 75
End: 2020-05-18

## 2020-05-18 DIAGNOSIS — M85.80 OSTEOPENIA AFTER MENOPAUSE: Primary | ICD-10-CM

## 2020-05-18 DIAGNOSIS — Z78.0 OSTEOPENIA AFTER MENOPAUSE: Primary | ICD-10-CM

## 2020-05-18 DIAGNOSIS — Z12.31 ENCOUNTER FOR SCREENING MAMMOGRAM FOR MALIGNANT NEOPLASM OF BREAST: ICD-10-CM

## 2020-07-06 ENCOUNTER — HOSPITAL ENCOUNTER (OUTPATIENT)
Dept: BONE DENSITY | Facility: HOSPITAL | Age: 75
Discharge: HOME | End: 2020-07-06

## 2020-07-06 ENCOUNTER — HOSPITAL ENCOUNTER (OUTPATIENT)
Dept: MAMMOGRAPHY | Facility: HOSPITAL | Age: 75
Discharge: HOME | End: 2020-07-06
Admitting: FAMILY MEDICINE

## 2020-07-06 DIAGNOSIS — Z12.31 ENCOUNTER FOR SCREENING MAMMOGRAM FOR MALIGNANT NEOPLASM OF BREAST: ICD-10-CM

## 2020-07-06 DIAGNOSIS — Z78.0 OSTEOPENIA AFTER MENOPAUSE: ICD-10-CM

## 2020-07-06 DIAGNOSIS — M85.80 OSTEOPENIA AFTER MENOPAUSE: ICD-10-CM

## 2020-07-06 PROCEDURE — 77063 BREAST TOMOSYNTHESIS BI: CPT

## 2020-07-06 PROCEDURE — 77080 DXA BONE DENSITY AXIAL: CPT

## 2020-07-06 PROCEDURE — 77067 SCR MAMMO BI INCL CAD: CPT

## 2020-07-27 ENCOUNTER — TRANSCRIBE ORDERS (OUTPATIENT)
Dept: ADMINISTRATIVE | Facility: HOSPITAL | Age: 75
End: 2020-07-27

## 2020-07-27 DIAGNOSIS — R00.2 PALPITATIONS: ICD-10-CM

## 2020-07-27 DIAGNOSIS — I10 ESSENTIAL (PRIMARY) HYPERTENSION: Primary | ICD-10-CM

## 2020-07-28 ENCOUNTER — HOSPITAL ENCOUNTER (OUTPATIENT)
Dept: CARDIOLOGY | Facility: HOSPITAL | Age: 75
Discharge: HOME | End: 2020-07-28
Admitting: FAMILY MEDICINE

## 2020-07-28 VITALS
HEIGHT: 64 IN | BODY MASS INDEX: 34.15 KG/M2 | DIASTOLIC BLOOD PRESSURE: 69 MMHG | SYSTOLIC BLOOD PRESSURE: 173 MMHG | WEIGHT: 200 LBS

## 2020-07-28 DIAGNOSIS — R00.2 PALPITATIONS: ICD-10-CM

## 2020-07-28 PROCEDURE — 93306 TTE W/DOPPLER COMPLETE: CPT

## 2020-07-28 PROCEDURE — 93306 TTE W/DOPPLER COMPLETE: CPT | Performed by: INTERNAL MEDICINE

## 2020-08-01 LAB
BH CV ECHO MEAS - AO MAX PG (FULL): 2.8 MMHG
BH CV ECHO MEAS - AO MAX PG: 7.1 MMHG
BH CV ECHO MEAS - AO MEAN PG (FULL): 2 MMHG
BH CV ECHO MEAS - AO MEAN PG: 4 MMHG
BH CV ECHO MEAS - AO ROOT AREA (BSA CORRECTED): 1.3
BH CV ECHO MEAS - AO ROOT AREA: 5.3 CM^2
BH CV ECHO MEAS - AO ROOT DIAM: 2.6 CM
BH CV ECHO MEAS - AO V2 MAX: 133 CM/SEC
BH CV ECHO MEAS - AO V2 MEAN: 90.6 CM/SEC
BH CV ECHO MEAS - AO V2 VTI: 32.3 CM
BH CV ECHO MEAS - AVA(I,A): 2.5 CM^2
BH CV ECHO MEAS - AVA(I,D): 2.5 CM^2
BH CV ECHO MEAS - AVA(V,A): 2.4 CM^2
BH CV ECHO MEAS - AVA(V,D): 2.4 CM^2
BH CV ECHO MEAS - BSA(HAYCOCK): 2.1 M^2
BH CV ECHO MEAS - BSA: 2 M^2
BH CV ECHO MEAS - BZI_BMI: 34.3 KILOGRAMS/M^2
BH CV ECHO MEAS - BZI_METRIC_HEIGHT: 162.6 CM
BH CV ECHO MEAS - BZI_METRIC_WEIGHT: 90.7 KG
BH CV ECHO MEAS - EDV(CUBED): 93.6 ML
BH CV ECHO MEAS - EDV(MOD-SP4): 70.7 ML
BH CV ECHO MEAS - EDV(TEICH): 94.4 ML
BH CV ECHO MEAS - EF(CUBED): 86 %
BH CV ECHO MEAS - EF(MOD-SP4): 69.2 %
BH CV ECHO MEAS - EF(TEICH): 79.5 %
BH CV ECHO MEAS - ESV(CUBED): 13.1 ML
BH CV ECHO MEAS - ESV(MOD-SP4): 21.8 ML
BH CV ECHO MEAS - ESV(TEICH): 19.3 ML
BH CV ECHO MEAS - FS: 48 %
BH CV ECHO MEAS - IVS/LVPW: 1.1
BH CV ECHO MEAS - IVSD: 0.88 CM
BH CV ECHO MEAS - LA DIMENSION: 3.4 CM
BH CV ECHO MEAS - LA/AO: 1.3
BH CV ECHO MEAS - LAT PEAK E' VEL: 7.1 CM/SEC
BH CV ECHO MEAS - LV DIASTOLIC VOL/BSA (35-75): 36.1 ML/M^2
BH CV ECHO MEAS - LV MASS(C)D: 120.8 GRAMS
BH CV ECHO MEAS - LV MASS(C)DI: 61.8 GRAMS/M^2
BH CV ECHO MEAS - LV MAX PG: 4.2 MMHG
BH CV ECHO MEAS - LV MEAN PG: 2 MMHG
BH CV ECHO MEAS - LV SYSTOLIC VOL/BSA (12-30): 11.1 ML/M^2
BH CV ECHO MEAS - LV V1 MAX: 103 CM/SEC
BH CV ECHO MEAS - LV V1 MEAN: 68.3 CM/SEC
BH CV ECHO MEAS - LV V1 VTI: 25.4 CM
BH CV ECHO MEAS - LVIDD: 4.5 CM
BH CV ECHO MEAS - LVIDS: 2.4 CM
BH CV ECHO MEAS - LVLD AP4: 7.4 CM
BH CV ECHO MEAS - LVLS AP4: 6 CM
BH CV ECHO MEAS - LVOT AREA (M): 3.1 CM^2
BH CV ECHO MEAS - LVOT AREA: 3.1 CM^2
BH CV ECHO MEAS - LVOT DIAM: 2 CM
BH CV ECHO MEAS - LVPWD: 0.78 CM
BH CV ECHO MEAS - MED PEAK E' VEL: 4.35 CM/SEC
BH CV ECHO MEAS - MV A MAX VEL: 81.5 CM/SEC
BH CV ECHO MEAS - MV DEC TIME: 0.25 SEC
BH CV ECHO MEAS - MV E MAX VEL: 64 CM/SEC
BH CV ECHO MEAS - MV E/A: 0.79
BH CV ECHO MEAS - RAP SYSTOLE: 5 MMHG
BH CV ECHO MEAS - RVSP: 23.1 MMHG
BH CV ECHO MEAS - SI(AO): 87.7 ML/M^2
BH CV ECHO MEAS - SI(CUBED): 41.1 ML/M^2
BH CV ECHO MEAS - SI(LVOT): 40.8 ML/M^2
BH CV ECHO MEAS - SI(MOD-SP4): 25 ML/M^2
BH CV ECHO MEAS - SI(TEICH): 38.4 ML/M^2
BH CV ECHO MEAS - SV(AO): 171.5 ML
BH CV ECHO MEAS - SV(CUBED): 80.4 ML
BH CV ECHO MEAS - SV(LVOT): 79.8 ML
BH CV ECHO MEAS - SV(MOD-SP4): 48.9 ML
BH CV ECHO MEAS - SV(TEICH): 75.1 ML
BH CV ECHO MEAS - TR MAX VEL: 213 CM/SEC
BH CV ECHO MEASUREMENTS AVERAGE E/E' RATIO: 11.18
LEFT ATRIUM VOLUME INDEX: 29.5 ML/M2
LEFT ATRIUM VOLUME: 57.9 CM3
MAXIMAL PREDICTED HEART RATE: 144 BPM
STRESS TARGET HR: 122 BPM

## 2020-08-03 RX ORDER — OLMESARTAN MEDOXOMIL 40 MG/1
40 TABLET ORAL DAILY
COMMUNITY
End: 2021-10-07 | Stop reason: ALTCHOICE

## 2020-08-03 RX ORDER — FELODIPINE 5 MG/1
5 TABLET, EXTENDED RELEASE ORAL DAILY
COMMUNITY
End: 2020-08-04 | Stop reason: ALTCHOICE

## 2020-08-03 RX ORDER — CARVEDILOL 12.5 MG/1
12.5 TABLET ORAL 2 TIMES DAILY WITH MEALS
Status: ON HOLD | COMMUNITY
End: 2020-11-12 | Stop reason: ALTCHOICE

## 2020-08-04 ENCOUNTER — OFFICE VISIT (OUTPATIENT)
Dept: GASTROENTEROLOGY | Age: 75
End: 2020-08-04
Payer: MEDICARE

## 2020-08-04 VITALS
BODY MASS INDEX: 32.44 KG/M2 | SYSTOLIC BLOOD PRESSURE: 160 MMHG | HEIGHT: 64 IN | DIASTOLIC BLOOD PRESSURE: 70 MMHG | WEIGHT: 190 LBS | OXYGEN SATURATION: 100 % | HEART RATE: 57 BPM

## 2020-08-04 PROCEDURE — 99214 OFFICE O/P EST MOD 30 MIN: CPT | Performed by: NURSE PRACTITIONER

## 2020-08-04 PROCEDURE — 4040F PNEUMOC VAC/ADMIN/RCVD: CPT | Performed by: NURSE PRACTITIONER

## 2020-08-04 PROCEDURE — 1123F ACP DISCUSS/DSCN MKR DOCD: CPT | Performed by: NURSE PRACTITIONER

## 2020-08-04 PROCEDURE — G8417 CALC BMI ABV UP PARAM F/U: HCPCS | Performed by: NURSE PRACTITIONER

## 2020-08-04 PROCEDURE — G8400 PT W/DXA NO RESULTS DOC: HCPCS | Performed by: NURSE PRACTITIONER

## 2020-08-04 PROCEDURE — 1036F TOBACCO NON-USER: CPT | Performed by: NURSE PRACTITIONER

## 2020-08-04 PROCEDURE — G8427 DOCREV CUR MEDS BY ELIG CLIN: HCPCS | Performed by: NURSE PRACTITIONER

## 2020-08-04 PROCEDURE — 1090F PRES/ABSN URINE INCON ASSESS: CPT | Performed by: NURSE PRACTITIONER

## 2020-08-04 RX ORDER — ALENDRONATE SODIUM 70 MG/1
TABLET ORAL
Status: ON HOLD | COMMUNITY
Start: 2020-07-22 | End: 2020-11-12 | Stop reason: ALTCHOICE

## 2020-08-04 RX ORDER — NIFEDIPINE 30 MG/1
30 TABLET, EXTENDED RELEASE ORAL DAILY
Status: ON HOLD | COMMUNITY
End: 2020-11-12 | Stop reason: ALTCHOICE

## 2020-08-04 ASSESSMENT — ENCOUNTER SYMPTOMS
RECTAL PAIN: 0
VOMITING: 0
SHORTNESS OF BREATH: 0
BACK PAIN: 0
ABDOMINAL DISTENTION: 0
NAUSEA: 0
VOICE CHANGE: 0
SORE THROAT: 0
TROUBLE SWALLOWING: 1
COUGH: 0
DIARRHEA: 0
CHEST TIGHTNESS: 0
BLOOD IN STOOL: 0
ABDOMINAL PAIN: 0
CONSTIPATION: 0

## 2020-08-04 NOTE — PROGRESS NOTES
Subjective:      Patient ID: Dorys Bhatt is a 67 y.o. female  MD Chin Mejia MD    [facility]  Chief Complaint   Patient presents with    Dysphagia         Dysphagia  Patient complains of difficulty swallowing. The dysphagia occurs with solids Symptoms have been present for approximately  2-3 months. The symptoms are gradually worsening. The dysphagia has been persistent. This has been associated with heartburn, melena and odynophagia. She denies hematemesis, nausea, unexpected weight loss and upper abdominal discomfort. She has history of \"bad reflux\" in past and took omeprazole otc for some time. Stopped it r/t side effects and now controls her reflux with diet and lifestyle changes. Has only occasional heartburn. Long term use of nsaid's for OA. She was recently prescribed fosamax but has not started yet. Seen a year ago for colonoscopy. History of adenomatous polyps. No current LGI complaints. She has been having problems with hypertension recently. Has had echo done and noted as normal. Has f/u with pcp tomorrow for this. 160/70 today. Assessment:      1. Esophageal dysphagia    2. Heartburn    3. Dark stools    4. NSAID long-term use            Plan:      Hold fosamax until EGD completed. Schedule EGD  Possible dilation    Nothing to eat or drink after midnight. No driving for 24 hours after procedure. Bring a  to procedure. No aspirin, NSAIDs, fish oil 5 days before procedure. I have discussed the benefits, alternatives, and risks (including bleeding, perforation and death)  for pursuing Endoscopy (EGD/Colonscopy/EUS/ERCP) with the patient and they are willing to continue. We also discussed the need for anesthesia, IV access, proper dietary changes, medication changes if necessary, and need for bowel prep (if ordered) prior to their Endoscopic procedure.   They are aware they must have someone accompany them to their scheduled procedure to drive them home - they agree to the above and are willing to continue. Plan for anesthesia: MAC  no reported complications    Patient advised to go to ER if she has anything lodged in esophagus - risk of perforation discussed.              Family History   Problem Relation Age of Onset    Prostate Cancer Father     Breast Cancer Sister     Lung Cancer Brother     Colon Cancer Paternal Aunt     Colon Polyps Paternal Aunt     Stomach Cancer Other     Esophageal Cancer Neg Hx     Liver Cancer Neg Hx     Liver Disease Neg Hx     Rectal Cancer Neg Hx        Past Medical History:   Diagnosis Date    Acute renal failure ([facility] [address] 07/29/2016    discharged 8/1/16; \"due to strong antibiotic and being dehydrated\"    Arthritis     Breast cancer ([address]     surgery only    Cancer ([facility] [address]     Breast cancer    Colon polyps     Diverticulitis     GERD (gastroesophageal reflux disease)     Hyperlipidemia     Hypertension     Joint pain     Thyroid condition        Past Surgical History:   Procedure Laterality Date    ABDOMEN SURGERY      BLADDER REPAIR      BLEPHAROPLASTY      BREAST ENHANCEMENT SURGERY      BUNIONECTOMY      CATARACT REMOVAL Bilateral     [facility]  3-    [facility]    COLONOSCOPY  04/29/2014    TA x 2, HP (5 yr)    COLONOSCOPY N/A 5/21/2019    Dr Sherri Church-questionable lipoma, diverticular disease-Tubular AP (-) dysplasia, 5 yr recall    KNEE SURGERY Left 08/17/2016    MASTECTOMY Bilateral 1984    OVARY REMOVAL      PARTIAL HYSTERECTOMY      PATELLA SURGERY      THYROIDECTOMY, PARTIAL      TONSILLECTOMY AND ADENOIDECTOMY      TOTAL KNEE ARTHROPLASTY Left 8/17/2016    KNEE TOTAL ARTHROPLASTY performed by Natali Akhtar MD at [address] [place] [address] OR       Current Outpatient Medications   Medication Sig Dispense Refill    NIFEdipine (PROCARDIA XL) 30 MG extended release tablet Take 30 mg by mouth daily      alendronate (FOSAMAX) 70 MG tablet TAKE 1 TABLET BY MOUTH ONE TIME PER WEEK      Specialty Vitamins Products (BIOTIN PLUS KERATIN) 33944-849 MCG-MG TABS Take by mouth daily      carvedilol (COREG) 12.5 MG tablet Take 12.5 mg by mouth 2 times daily (with meals)      olmesartan (BENICAR) 40 MG tablet Take 40 mg by mouth daily      meloxicam (MOBIC) 15 MG tablet Take 15 mg by mouth daily      levothyroxine (SYNTHROID) 50 MCG tablet Take of the 100 mcg Synthroid - use two 50s (Patient taking differently: Take 100 mcg by mouth Daily Take of the 100 mcg Synthroid - use two 50s) 30 tablet 0    aspirin 81 MG EC tablet Take 81 mg by mouth daily      simvastatin (ZOCOR) 20 MG tablet Take 20 mg by mouth nightly        No current facility-administered medications for this visit. Allergies   Allergen Reactions    Ciprofloxacin     Morphine      Very Restless        reports that she has never smoked. She has never used smokeless tobacco. She reports that she does not drink alcohol or use drugs. Review of Systems   Constitutional: Negative for appetite change, chills, fever and unexpected weight change. HENT: Positive for trouble swallowing (with pain). Negative for sore throat and voice change. Respiratory: Negative for cough, chest tightness and shortness of breath. Cardiovascular: Negative for chest pain, palpitations and leg swelling. Gastrointestinal: Negative for abdominal distention, abdominal pain, blood in stool, constipation, diarrhea, nausea, rectal pain and vomiting. Heartburn, dark stools   Musculoskeletal: Positive for arthralgias. Negative for back pain and gait problem. Skin: Negative for pallor, rash and wound. Neurological: Negative for dizziness, weakness and light-headedness. Hematological: Negative for adenopathy. Does not bruise/bleed easily. All other systems reviewed and are negative. Objective:   Physical Exam  Constitutional:       General: She is not in acute distress. Appearance: Normal appearance.  She is well-developed and overweight. Comments: BP (!) 160/70   Pulse 57   Ht 5' 4\" (1.626 m)   Wt 190 lb (86.2 kg)   SpO2 100%   BMI 32.61 kg/m²      Eyes:      General: No scleral icterus. Conjunctiva/sclera: Conjunctivae normal.   Neck:      Trachea: No tracheal deviation. Cardiovascular:      Rate and Rhythm: Normal rate and regular rhythm. Heart sounds: No murmur. No friction rub. No gallop. Pulmonary:      Effort: Pulmonary effort is normal. No respiratory distress. Breath sounds: Normal breath sounds. No wheezing, rhonchi or rales. Abdominal:      General: Bowel sounds are normal. There is no distension. Palpations: Abdomen is soft. There is no hepatomegaly or mass. Tenderness: There is no abdominal tenderness. There is no guarding or rebound. Skin:     General: Skin is warm and dry. Coloration: Skin is not pale. Neurological:      Mental Status: She is alert and oriented to person, place, and time. Psychiatric:         Behavior: Behavior normal.         Thought Content:  Thought content normal.

## 2020-08-18 ENCOUNTER — APPOINTMENT (OUTPATIENT)
Dept: CARDIOLOGY | Facility: HOSPITAL | Age: 75
End: 2020-08-18

## 2020-08-23 ENCOUNTER — OFFICE VISIT (OUTPATIENT)
Age: 75
End: 2020-08-23

## 2020-08-23 VITALS — TEMPERATURE: 96.9 F | OXYGEN SATURATION: 94 % | HEART RATE: 61 BPM

## 2020-08-25 LAB — SARS-COV-2, NAA: NOT DETECTED

## 2020-08-26 ENCOUNTER — ANESTHESIA EVENT (OUTPATIENT)
Dept: OPERATING ROOM | Age: 75
End: 2020-08-26

## 2020-08-27 ENCOUNTER — TELEPHONE (OUTPATIENT)
Dept: GASTROENTEROLOGY | Age: 75
End: 2020-08-27

## 2020-08-27 ENCOUNTER — HOSPITAL ENCOUNTER (OUTPATIENT)
Age: 75
Setting detail: SPECIMEN
Discharge: HOME | End: 2020-08-27
Payer: MEDICARE

## 2020-08-27 ENCOUNTER — HOSPITAL ENCOUNTER (OUTPATIENT)
Age: 75
Setting detail: OUTPATIENT SURGERY
Discharge: HOME | End: 2020-08-27
Attending: INTERNAL MEDICINE | Admitting: INTERNAL MEDICINE
Payer: MEDICARE

## 2020-08-27 ENCOUNTER — APPOINTMENT (OUTPATIENT)
Dept: OPERATING ROOM | Age: 75
End: 2020-08-27

## 2020-08-27 ENCOUNTER — ANESTHESIA (OUTPATIENT)
Dept: OPERATING ROOM | Age: 75
End: 2020-08-27

## 2020-08-27 VITALS
DIASTOLIC BLOOD PRESSURE: 62 MMHG | HEIGHT: 64 IN | WEIGHT: 188 LBS | OXYGEN SATURATION: 95 % | RESPIRATION RATE: 18 BRPM | TEMPERATURE: 97.7 F | HEART RATE: 57 BPM | BODY MASS INDEX: 32.1 KG/M2 | SYSTOLIC BLOOD PRESSURE: 117 MMHG

## 2020-08-27 VITALS — OXYGEN SATURATION: 93 % | SYSTOLIC BLOOD PRESSURE: 118 MMHG | DIASTOLIC BLOOD PRESSURE: 65 MMHG

## 2020-08-27 PROCEDURE — G8918 PT W/O PREOP ORDER IV AB PRO: HCPCS

## 2020-08-27 PROCEDURE — 43239 EGD BIOPSY SINGLE/MULTIPLE: CPT | Performed by: INTERNAL MEDICINE

## 2020-08-27 PROCEDURE — G8907 PT DOC NO EVENTS ON DISCHARG: HCPCS

## 2020-08-27 PROCEDURE — 88305 TISSUE EXAM BY PATHOLOGIST: CPT

## 2020-08-27 PROCEDURE — 43239 EGD BIOPSY SINGLE/MULTIPLE: CPT

## 2020-08-27 PROCEDURE — 43248 EGD GUIDE WIRE INSERTION: CPT

## 2020-08-27 PROCEDURE — 43248 EGD GUIDE WIRE INSERTION: CPT | Performed by: INTERNAL MEDICINE

## 2020-08-27 PROCEDURE — 88342 IMHCHEM/IMCYTCHM 1ST ANTB: CPT

## 2020-08-27 RX ORDER — SODIUM CHLORIDE 9 MG/ML
INJECTION, SOLUTION INTRAVENOUS CONTINUOUS PRN
Status: DISCONTINUED | OUTPATIENT
Start: 2020-08-27 | End: 2020-08-27 | Stop reason: SDUPTHER

## 2020-08-27 RX ORDER — OMEPRAZOLE 40 MG/1
40 CAPSULE, DELAYED RELEASE ORAL
Qty: 90 CAPSULE | Refills: 3 | Status: SHIPPED | OUTPATIENT
Start: 2020-08-27 | End: 2020-11-17

## 2020-08-27 RX ORDER — PROPOFOL 10 MG/ML
INJECTION, EMULSION INTRAVENOUS PRN
Status: DISCONTINUED | OUTPATIENT
Start: 2020-08-27 | End: 2020-08-27 | Stop reason: SDUPTHER

## 2020-08-27 RX ORDER — SODIUM CHLORIDE 9 MG/ML
INJECTION, SOLUTION INTRAVENOUS CONTINUOUS
Status: DISCONTINUED | OUTPATIENT
Start: 2020-08-27 | End: 2020-08-27 | Stop reason: HOSPADM

## 2020-08-27 RX ORDER — SUCRALFATE ORAL 1 G/10ML
1 SUSPENSION ORAL 3 TIMES DAILY
Qty: 1200 ML | Refills: 3 | Status: SHIPPED | OUTPATIENT
Start: 2020-08-27 | End: 2021-10-07 | Stop reason: ALTCHOICE

## 2020-08-27 RX ORDER — LIDOCAINE HYDROCHLORIDE 10 MG/ML
INJECTION, SOLUTION INFILTRATION; PERINEURAL PRN
Status: DISCONTINUED | OUTPATIENT
Start: 2020-08-27 | End: 2020-08-27 | Stop reason: SDUPTHER

## 2020-08-27 RX ADMIN — SODIUM CHLORIDE: 9 INJECTION, SOLUTION INTRAVENOUS at 09:15

## 2020-08-27 RX ADMIN — LIDOCAINE HYDROCHLORIDE 40 MG: 10 INJECTION, SOLUTION INFILTRATION; PERINEURAL at 09:16

## 2020-08-27 RX ADMIN — SODIUM CHLORIDE: 9 INJECTION, SOLUTION INTRAVENOUS at 08:55

## 2020-08-27 RX ADMIN — PROPOFOL 220 MG: 10 INJECTION, EMULSION INTRAVENOUS at 09:16

## 2020-08-27 NOTE — OP NOTE
inflamed 1 cm deep ulceration with a dark spot on the medial wall, there was no active bleeding or oozing noted. IMPRESSION:  1. Esophagitis- biopsies obtained as above   2. S/p wire guided dilation- 50 F     RECOMMENDATIONS:    1. Await path results, the patient will be contacted in 7-10 days with biopsy results. 2.  PPI twice a day x 3 months  3. Carafate slurry TID  4. Discontinue mobic/complete avoidance of NSAIDs  5. Repeat EGD in 3 months    The results were discussed with the patient and family. A copy of the images obtained were given to the patient. Yoselin Hobson am scribing for and in the presence of Dr. Carmen Reddy MD.  Electronically signed by Shamika Gardiner RN on 8/27/2020 at 8:50 AM    I personally performed the services described in this documentation as scribed by Elodia Melo, and it appears accurate and complete.      Claire Sloan MD  8/27/2020

## 2020-08-27 NOTE — H&P
Patient Name: Rimma No  : 1945  MRN: [id number]  DATE: 20    Allergies: Allergies   Allergen Reactions    Ciprofloxacin     Morphine      Very Restless        ENDOSCOPY  History and Physical    Procedure:    [] Diagnostic Colonoscopy       [] Screening Colonoscopy  [x] EGD      [] ERCP      [] EUS       [] Other    [x] Previous office notes/History and Physical reviewed from the patients chart. Please see EMR for further details of HPI. I have examined the patient's status immediately prior to the procedure and:      Indications/HPI:    []Abdominal Pain   []Cancer- GI/Lung     []Fhx of colon CA/polyps  []History of Polyps  []Barretts            [x]Melena  []Abnormal Imaging              [x]Dysphagia              []Persistent Pneumonia   []Anemia                            []Food Impaction        []History of Polyps  [] GI Bleed             []Pulmonary nodule/Mass   []Change in bowel habits [x]Heartburn/Reflux  []Rectal Bleed (BRBPR)  []Chest Pain - Non Cardiac []Heme (+) Stool []Ulcers  []Constipation  []Hemoptysis  []Varices  []Diarrhea  []Hypoxemia    []Nausea/Vomiting   []Screening   []Crohns/Colitis  []Other:     Anesthesia:   [x] MAC [] Moderate Sedation   [] General   [] None     ROS: 12 pt Review of Symptoms was negative unless mentioned above    Medications:   Prior to Admission medications    Medication Sig Start Date End Date Taking?  Authorizing Provider   NIFEdipine (PROCARDIA XL) 30 MG extended release tablet Take 30 mg by mouth daily    Historical Provider, MD   alendronate (FOSAMAX) 70 MG tablet TAKE 1 TABLET BY MOUTH ONE TIME PER WEEK 20   Historical Provider, MD   Specialty Vitamins Products (BIOTIN PLUS KERATIN) 40115-829 MCG-MG TABS Take by mouth daily    Historical Provider, MD   carvedilol (COREG) 12.5 MG tablet Take 12.5 mg by mouth 2 times daily (with meals)    Historical Provider, MD   olmesartan (BENICAR) 40 MG tablet Take 40 mg by mouth daily    Historical Provider, MD   meloxicam (MOBIC) 15 MG tablet Take 15 mg by mouth daily    Historical Provider, MD   levothyroxine (SYNTHROID) 50 MCG tablet Take of the 100 mcg Synthroid - use two 50s  Patient taking differently: Take 100 mcg by mouth Daily Take of the 100 mcg Synthroid - use two 50s 10/22/17   Wallace Abreu MD   aspirin 81 MG EC tablet Take 81 mg by mouth daily    Historical Provider, MD   simvastatin (ZOCOR) 20 MG tablet Take 20 mg by mouth nightly     Historical Provider, MD       Past Medical History:  Past Medical History:   Diagnosis Date    Acute renal failure ([facility] [address] 07/29/2016    discharged 8/1/16; \"due to strong antibiotic and being dehydrated\"    Arthritis     Breast cancer ([facility] [address]     surgery only    Cancer ([facility] [address]     Breast cancer    Colon polyps     Diverticulitis     GERD (gastroesophageal reflux disease)     Hyperlipidemia     Hypertension     Joint pain     Thyroid condition        Past Surgical History:  Past Surgical History:   Procedure Laterality Date    ABDOMEN SURGERY      BLADDER REPAIR      BLEPHAROPLASTY      BREAST ENHANCEMENT SURGERY      BUNIONECTOMY      CATARACT REMOVAL Bilateral     [facility]    COLONOSCOPY  04/29/2014    TA x 2, HP (5 yr)    COLONOSCOPY N/A 5/21/2019    Dr Joyce Church-questionable lipoma, diverticular disease-Tubular AP (-) dysplasia, 5 yr recall    KNEE SURGERY Left 08/17/2016    MASTECTOMY Bilateral 1984    OVARY REMOVAL      PARTIAL HYSTERECTOMY      PATELLA SURGERY      THYROIDECTOMY, PARTIAL      TONSILLECTOMY AND ADENOIDECTOMY      TOTAL KNEE ARTHROPLASTY Left 8/17/2016    KNEE TOTAL ARTHROPLASTY performed by Maritza Murcia MD at [place] [address] History:  Social History     Tobacco Use    Smoking status: Never Smoker    Smokeless tobacco: Never Used   Substance Use Topics    Alcohol use: No    Drug use: No       Vital Signs: There were no vitals filed for this visit.      Physical Exam:  Cardiac:  [x]WNL  []Comments:  Pulmonary:  [x]WNL   []Comments:  Neuro/Mental Status:  [x]WNL  []Comments:  Abdominal:  [x]WNL    []Comments:  Other:   []WNL  []Comments:    Informed Consent:  The risks and benefits of the procedure have been discussed with either the patient or if they cannot consent, their representative. Assessment:  Patient examined and appropriate for planned sedation and procedure. Plan:  Proceed with planned sedation and procedure as above. Yoselin Hobson am scribing for and in the presence of Dr. Carmen Reddy MD.  Electronically signed by Shamika Gardiner RN on 8/27/2020 at 8:49 AM    I personally performed the services described in this documentation as scribed by Elodia Melo, and it appears accurate and complete.      Carmen Reddy MD  8/27/2020

## 2020-08-27 NOTE — ANESTHESIA POSTPROCEDURE EVALUATION
Department of Anesthesiology  Postprocedure Note    Patient: Cinthia Montana  MRN: [id number]  YOB: 1945  Date of evaluation: 8/27/2020  Time:  9:29 AM     Procedure Summary     Date:  08/27/20 Room / Location:  [facility] ENDO 01 / [address]    Anesthesia Start:  1169 Anesthesia Stop:      Procedure:  EGD DILATION SAVORY AND BIOPSY (N/A Esophagus) Diagnosis:  (DYSPHAGIA, MELENA, ODYNOPHAGIA)    Surgeon:  Jhon Novoa MD Responsible Provider:  JEANNA Huang CRNA    Anesthesia Type:  general, TIVA ASA Status:  3          Anesthesia Type: general, TIVA    Sophie Phase I:      Sophie Phase II:      Last vitals: Reviewed and per EMR flowsheets.        Anesthesia Post Evaluation    Patient location during evaluation: bedside  Patient participation: complete - patient participated  Level of consciousness: sleepy but conscious  Pain score: 0  Airway patency: patent  Nausea & Vomiting: no nausea and no vomiting  Complications: no  Cardiovascular status: hemodynamically stable and blood pressure returned to baseline  Respiratory status: acceptable and nasal cannula  Hydration status: stable

## 2020-08-27 NOTE — TELEPHONE ENCOUNTER
Rubén Carpenter,    Per Dr Donovan Walters today:    IMPRESSION:  1. Esophagitis- biopsies obtained as above     2. S/p wire guided dilation- 51 F     RECOMMENDATIONS:    1. Await path results, the patient will be contacted in 7-10 days with biopsy results. 2.  PPI twice a day x 3 months  3. Carafate slurry TID  4. Discontinue mobic/complete avoidance of NSAIDs  5. Repeat EGD in 3 months     The results were discussed with the patient and family.   A copy of the images obtained were given to the patient.      I, Fredrick Lee, am scribing for and in the presence of Dr. Vinita Frenandes MD.  Electronically signed by Latoya Hernandez RN on 8/27/2020 at 8:50 AM     I personally performed the services described in this documentation as scribed by Fredrick Lee, and it appears accurate and complete.      Efren Shukla MD  8/27/2020

## 2020-08-27 NOTE — TELEPHONE ENCOUNTER
sucralfate (CARAFATE) 1 GM/10ML suspension   Date: 8/27/2020  Department: [facility]  Ordering/Authorizing: Thierry Kenney MD    Outpatient Medication Detail      Disp  Refills  Start  End     sucralfate (CARAFATE) 1 GM/10ML suspension  1200 mL  3  8/27/2020      Sig - Route: Take 10 mLs by mouth three times daily - Oral     Sent to pharmacy as: Sucralfate 1 GM/10ML Oral Suspension (Carafate)     E-Prescribing Status: Receipt confirmed by pharmacy (8/27/2020  9:34 AM CDT)           [address] send a [employer] message on Carafate 1 gm Susp 1 gm Tid said it is not covered by the patient's insurance, it is very expensive and asking if they can change this to the Generic Tablets 1 gm Tid. I told them this will be fine and instruct the patient to do a slurry, take this medication 1 hour apart from other medications, Dispense # 90 at a Tid dosing and keep the refills the same at 3.  [facility]

## 2020-08-27 NOTE — ANESTHESIA PRE PROCEDURE
Hypothyroidism E03.9    Hyperlipidemia E78.5    Gastroesophageal reflux disease K21.9    Diverticulitis K57.92    Insomnia G47.00    History of colonic diverticulitis Z87.19    Lipoma of colon D17.5       Past Medical History:        Diagnosis Date    Acute renal failure ([facility] [address] 07/29/2016    discharged 8/1/16; \"due to strong antibiotic and being dehydrated\"    Arthritis     Breast cancer ([facility] [address]     surgery only    Cancer ([facility] [address]     Breast cancer    Colon polyps     Diverticulitis     GERD (gastroesophageal reflux disease)     Hyperlipidemia     Hypertension     Joint pain     Thyroid condition        Past Surgical History:        Procedure Laterality Date    ABDOMEN SURGERY      BLADDER REPAIR      BLEPHAROPLASTY      BREAST ENHANCEMENT SURGERY      BUNIONECTOMY      CATARACT REMOVAL Bilateral     COLON[facility]COPY  3-    [facility]    COLONOSCOPY  04/29/2014    TA x 2, HP (5 yr)    COLONOSCOPY N/A 5/21/2019    Dr Brianna Church-questionable lipoma, diverticular disease-Tubular AP (-) dysplasia, 5 yr recall    KNEE SURGERY Left 08/17/2016    MASTECTOMY Bilateral 1984    OVARY REMOVAL      PARTIAL HYSTERECTOMY      PATELLA SURGERY      THYROIDECTOMY, PARTIAL      TONSILLECTOMY AND ADENOIDECTOMY      TOTAL KNEE ARTHROPLASTY Left 8/17/2016    KNEE TOTAL ARTHROPLASTY performed by Taniya Kurtz MD at [place] [address] History:    Social History     Tobacco Use    Smoking status: Never Smoker    Smokeless tobacco: Never Used   Substance Use Topics    Alcohol use: No                                Counseling given: Not Answered      Vital Signs (Current): There were no vitals filed for this visit.                                            BP Readings from Last 3 Encounters:   08/04/20 (!) 160/70   05/21/19 114/64   05/21/19 110/63       NPO Status:                                                                                 BMI:   Wt Readings from Last 3 Encounters:   08/04/20 190 lb Anesthesia Plan      general and TIVA     ASA 3       Induction: intravenous. Anesthetic plan and risks discussed with patient.                       JEANNA Escobar - CRNA   8/27/2020

## 2020-09-02 PROCEDURE — 43239 EGD BIOPSY SINGLE/MULTIPLE: CPT

## 2020-09-02 PROCEDURE — 43248 EGD GUIDE WIRE INSERTION: CPT

## 2020-09-08 ENCOUNTER — TELEPHONE (OUTPATIENT)
Dept: GASTROENTEROLOGY | Age: 75
End: 2020-09-08

## 2020-09-08 NOTE — TELEPHONE ENCOUNTER
[address] [facility] [address]                     [address] [facility]                                   [place] [address]   Department of Pathology   FINAL SURGICAL PATHOLOGY REPORT   Patient Name: Agnes Resendiz            Accession No:  [id number]    Age Sex:   1945    75 Y F        Pt Type: O     HELDER                                              Location:   Account #     [de-identified]                 Collected:     2020   [facility] Rec #      [id number]                    Received:      2020   Attend Phys:                               Completed:     2020   Perform Phys: Debby Abdullahi     FINAL DIAGNOSIS:     A.  Stomach, gastric biopsy:   Fundic and antral-type gastric mucosa with mild chronic inflammation. Negative for active inflammation. Negative for Helicobacter pylori organisms by immunohistochemical   staining. Negative for intestinal metaplasia. Negative for dysplasia. Nancy Finely, biopsy:   Squamocolumnar gastroesophageal junction with moderate chronic active   inflammation. Positive for intestinal metaplasia (Marie's esophagus). Indeterminate for dysplasia.      CLR/CLR     Patient called today from [phone]. Last OV with  arvind 20. Egd per  20. No FU scheduled. Due for repeat EGD in 3 months but has not been scheduled for that yet. Patient on last EGD has a severely inflamed 1 cm deep ulcer/Duodenum and is currently on Carafate 1 gm tablets using as a slurry. Patient is asking how long she will have to take the Carafate 1 gm Tid and she is also asking for her recent path results, see above. Pt asked that I send to  aprn to review and we can then call her back, also patient wishes to go ahead and get her next EGD scheduled so I will send the message to RL  as well.  Nikita ojeda

## 2020-09-08 NOTE — TELEPHONE ENCOUNTER
Biopsies positive for Marie's esophagus. Indeterminate dysplasia. He will repeat biopsies when he does repeat EGD. If any dysplasia is present she may need an additional procedure to eliminate this. She will need to take the carafate until she has the repeat EGD. If ulcer is resolved he will advise whether to stop taking or not. She needs to make sure she is also taking the PPI as ordered, practice GERD dietary and lifestyle recs including no smoking if she is a smoker.

## 2020-09-09 NOTE — TELEPHONE ENCOUNTER
Thanks Nahid Lynn, the patient has been notified of your additional information and said she will be waiting on a call to get her repeat EGD scheduled in 3 months, I will send to RL as well.  Nikita ojeda

## 2020-09-30 ENCOUNTER — HOSPITAL ENCOUNTER (OUTPATIENT)
Dept: ULTRASOUND IMAGING | Facility: HOSPITAL | Age: 75
Discharge: HOME | End: 2020-09-30
Admitting: NURSE PRACTITIONER

## 2020-09-30 ENCOUNTER — TELEPHONE (OUTPATIENT)
Dept: VASCULAR SURGERY | Facility: CLINIC | Age: 75
End: 2020-09-30

## 2020-09-30 DIAGNOSIS — I65.23 BILATERAL CAROTID ARTERY STENOSIS: ICD-10-CM

## 2020-09-30 PROCEDURE — 93880 EXTRACRANIAL BILAT STUDY: CPT | Performed by: SURGERY

## 2020-09-30 PROCEDURE — 93880 EXTRACRANIAL BILAT STUDY: CPT

## 2020-09-30 NOTE — TELEPHONE ENCOUNTER
Spoke with Mrs Conner reminding her of her appointment for Thursday, October 1st, 2020 at 10 am with Dr Bentley. Mrs Conner confirmed she would be here.       Test Complete [id number]

## 2020-10-01 ENCOUNTER — OFFICE VISIT (OUTPATIENT)
Dept: VASCULAR SURGERY | Facility: CLINIC | Age: 75
End: 2020-10-01

## 2020-10-01 VITALS
WEIGHT: 187 LBS | SYSTOLIC BLOOD PRESSURE: 142 MMHG | BODY MASS INDEX: 31.92 KG/M2 | HEIGHT: 64 IN | DIASTOLIC BLOOD PRESSURE: 92 MMHG | HEART RATE: 74 BPM | OXYGEN SATURATION: 97 %

## 2020-10-01 DIAGNOSIS — I10 ESSENTIAL HYPERTENSION: ICD-10-CM

## 2020-10-01 DIAGNOSIS — I65.23 BILATERAL CAROTID ARTERY STENOSIS: Primary | ICD-10-CM

## 2020-10-01 DIAGNOSIS — I87.323 CHRONIC VENOUS HYPERTENSION (IDIOPATHIC) WITH INFLAMMATION OF BILATERAL LOWER EXTREMITY: ICD-10-CM

## 2020-10-01 DIAGNOSIS — E78.2 MIXED HYPERLIPIDEMIA: ICD-10-CM

## 2020-10-01 PROCEDURE — 99214 OFFICE O/P EST MOD 30 MIN: CPT | Performed by: SURGERY

## 2020-10-01 RX ORDER — OLMESARTAN MEDOXOMIL AND HYDROCHLOROTHIAZIDE 40/12.5 40; 12.5 MG/1; MG/1
1 TABLET ORAL DAILY
COMMUNITY
Start: 2020-09-04

## 2020-10-01 RX ORDER — OMEPRAZOLE 40 MG/1
40 CAPSULE, DELAYED RELEASE ORAL 2 TIMES DAILY
COMMUNITY
Start: 2020-08-27 | End: 2022-04-20

## 2020-10-01 RX ORDER — SUCRALFATE 1 G/1
1 TABLET ORAL 3 TIMES DAILY
COMMUNITY
Start: 2020-09-21 | End: 2021-04-02

## 2020-10-01 NOTE — PROGRESS NOTES
"10/1/2020       Arnulfo Wynn MD  [address] [facility] [place] [address]    Laura Conner  1945    Chief Complaint   Patient presents with   • Follow-up     1 yr f/u with carotid testing completed on 9/30/20.  Last seen in the office on 10/15/19. Pt denies any stroke like symptoms.    • Med Management     Verbally went over the patient's medications with her.  Pt was taking a low dose aspirin, however, she developed ulcers and was taken off of the aspirin.        Dear Arnulfo Wynn MD       HPI  I had the pleasure of seeing your patient Laura Conner in the office today.    As you recall, Laura Conner is a 75 y.o.  female who we are following for venous insufficiency and asymptomatic carotid occlusive disease.  She has had complaints of leg swelling since about February per recall.  She does have some spider varicosities and reported cramping at night with heaviness and tiredness.  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  She did undergo a left lower extremity radiofrequency ablation on 11/5/18 and right lower extremity radiofrequency ablation on 11/19/18.  She does still have swelling, but has not been wearing compression stockings. She states her legs are sensitive.  She also remains asymptomatic from a strokelike standpoint.  She did have noninasive testing performed, which I did review in office.         Review of Systems   Constitutional: Negative.    HENT: Negative.    Eyes: Negative.    Respiratory: Negative.    Cardiovascular: Positive for leg swelling (with cramping to BLE).   Gastrointestinal: Negative.    Endocrine: Negative.    Genitourinary: Negative.    Musculoskeletal: Positive for neck pain (intermittent muscle spasm right posterior neck).   Skin: Negative.    Allergic/Immunologic: Negative.    Neurological: Negative.    Hematological: Negative.    Psychiatric/Behavioral: The patient is nervous/anxious.        /92   Pulse 74   Ht 162.6 cm (64\")   Wt 84.8 kg " (187 lb)   LMP  (LMP Unknown)   SpO2 97%   BMI 32.10 kg/m²   Physical Exam   Constitutional: She is oriented to person, place, and time. She appears well-developed. No distress.   HENT:   Head: Normocephalic and atraumatic.   Eyes: Pupils are equal, round, and reactive to light. No scleral icterus.   Neck: Normal range of motion. Neck supple. No JVD present. Carotid bruit is not present. No thyromegaly present.   Cardiovascular: Normal rate, regular rhythm, S2 normal, normal heart sounds and normal pulses. Exam reveals no gallop and no friction rub.   No murmur heard.  Pulmonary/Chest: Effort normal and breath sounds normal.   Abdominal: Soft. Bowel sounds are normal.   Musculoskeletal: Normal range of motion.      Comments: Swelling to left medial knee with tenderness   Neurological: She is alert and oriented to person, place, and time. No cranial nerve deficit.   Skin: Skin is warm and dry. She is not diaphoretic.   Psychiatric: Her behavior is normal. Judgment and thought content normal.   Nursing note and vitals reviewed.    Diagnostic Data:    Us Carotid Bilateral    Result Date: 9/30/2020  Narrative: History: Carotid occlusive disease      Impression: Impression: 1. There is less than 50% stenosis of the right internal carotid artery. 2. There is less than 50% stenosis of the left internal carotid artery. 3. Antegrade flow is demonstrated in bilateral vertebral arteries.  Comments: Bilateral carotid vertebral arterial duplex scan was performed.  Grayscale imaging shows intimal thickening and calcified elements at the carotid bifurcation. The right internal carotid artery peak systolic velocity is 72.7 cm/sec. The end-diastolic velocity is 24.6 cm/sec. The right ICA/CCA ratio is approximately 0.84 . These findings correlate with less than 50% stenosis of the right internal carotid artery.  Grayscale imaging shows intimal thickening and calcified elements at the carotid bifurcation. The left internal carotid  artery peak systolic velocity is 87.4 cm/sec. The end-diastolic velocity is 28.1 cm/sec. The left ICA/CCA ratio is approximately 0.88 . These findings correlate with less than 50% stenosis of the left internal carotid artery.  Antegrade flow is demonstrated in bilateral vertebral arteries. There is greater than 50% stenosis of the left external carotid artery. This report was finalized on 09/30/2020 14:12 by Dr. Maco Bentley MD.      Patient Active Problem List   Diagnosis   • Diverticulitis   • Diverticulosis   • Gastroesophageal reflux disease   • History of colon polyps   • Hyperlipidemia   • Hypothyroidism   • Insomnia   • Irritable bowel syndrome with diarrhea   • Primary osteoarthritis of left knee   • BMI 33.0-33.9,adult   • Venous insufficiency   • Hypertension   • Hypercholesteremia   • Hypertriglyceridemia   • Bilateral carotid artery stenosis   • Chronic venous hypertension (idiopathic) with inflammation of bilateral lower extremity         ICD-10-CM ICD-9-CM   1. Bilateral carotid artery stenosis  I65.23 433.10     433.30   2. Mixed hyperlipidemia  E78.2 272.2   3. Essential hypertension  I10 401.9   4. Chronic venous hypertension (idiopathic) with inflammation of bilateral lower extremity  I87.323 459.32       Plan: After thoroughly evaluating Laura Conner, I the best course of action is to remain conservative from vascular surgery standpoint.  I did review her testing which shows less than 50% carotid stenosis bilaterally.  She does have some complaints of heaviness and mild leg swelling however does not wear compression stockings.  I encouraged her to begin wearing compression on a daily basis.  This should help with some of her symptoms.  I did discuss vascular risk factors as they pertain to the progression of vascular disease including controlling her hypertension and hyperlipidemia, which are stable.  We will see her back in 6 months with a 2 leg VVI to see if the remaining veins from the  knee down are still contributing to some of her discomfort.   Body mass index is 32.1 kg/m². BMI chart given.  The patient can continue taking her current medication regimen as previously planned.  This was all discussed in full with complete understanding.    Thank you for allowing me to participate in the care of your patient.  Please do not hesitate with any questions or concerns.  I will keep you aware of any further encounters with Laura Conner.        Sincerely yours,         DO Tianna Staples James Noah, MD

## 2020-10-21 NOTE — TELEPHONE ENCOUNTER
Patient is scheduled for [email or web address] for repeat EGD - she will get her Covid test done on 11/8/20

## 2020-11-08 ENCOUNTER — OFFICE VISIT (OUTPATIENT)
Age: 75
End: 2020-11-08

## 2020-11-08 VITALS — HEART RATE: 61 BPM | OXYGEN SATURATION: 97 % | TEMPERATURE: 97.4 F

## 2020-11-08 PROCEDURE — 99999 PR OFFICE/OUTPT VISIT,PROCEDURE ONLY: CPT | Performed by: NURSE PRACTITIONER

## 2020-11-09 LAB — SARS-COV-2, PCR: NOT DETECTED

## 2020-11-11 ENCOUNTER — ANESTHESIA EVENT (OUTPATIENT)
Dept: OPERATING ROOM | Age: 75
End: 2020-11-11

## 2020-11-12 ENCOUNTER — ANESTHESIA (OUTPATIENT)
Dept: OPERATING ROOM | Age: 75
End: 2020-11-12

## 2020-11-12 ENCOUNTER — HOSPITAL ENCOUNTER (OUTPATIENT)
Age: 75
Setting detail: OUTPATIENT SURGERY
Discharge: HOME | End: 2020-11-12
Attending: INTERNAL MEDICINE | Admitting: INTERNAL MEDICINE
Payer: MEDICARE

## 2020-11-12 ENCOUNTER — APPOINTMENT (OUTPATIENT)
Dept: OPERATING ROOM | Age: 75
End: 2020-11-12

## 2020-11-12 ENCOUNTER — HOSPITAL ENCOUNTER (OUTPATIENT)
Age: 75
Setting detail: SPECIMEN
Discharge: HOME | End: 2020-11-12
Payer: MEDICARE

## 2020-11-12 VITALS — OXYGEN SATURATION: 97 % | SYSTOLIC BLOOD PRESSURE: 177 MMHG | DIASTOLIC BLOOD PRESSURE: 93 MMHG

## 2020-11-12 VITALS
WEIGHT: 181 LBS | DIASTOLIC BLOOD PRESSURE: 77 MMHG | HEART RATE: 52 BPM | HEIGHT: 64 IN | SYSTOLIC BLOOD PRESSURE: 140 MMHG | RESPIRATION RATE: 20 BRPM | BODY MASS INDEX: 30.9 KG/M2 | TEMPERATURE: 98.6 F | OXYGEN SATURATION: 96 %

## 2020-11-12 PROCEDURE — 43239 EGD BIOPSY SINGLE/MULTIPLE: CPT

## 2020-11-12 PROCEDURE — 88305 TISSUE EXAM BY PATHOLOGIST: CPT

## 2020-11-12 PROCEDURE — 43248 EGD GUIDE WIRE INSERTION: CPT

## 2020-11-12 PROCEDURE — 43239 EGD BIOPSY SINGLE/MULTIPLE: CPT | Performed by: INTERNAL MEDICINE

## 2020-11-12 PROCEDURE — 43248 EGD GUIDE WIRE INSERTION: CPT | Performed by: INTERNAL MEDICINE

## 2020-11-12 RX ORDER — SODIUM CHLORIDE 9 MG/ML
INJECTION, SOLUTION INTRAVENOUS CONTINUOUS
Status: DISCONTINUED | OUTPATIENT
Start: 2020-11-12 | End: 2020-11-12 | Stop reason: HOSPADM

## 2020-11-12 RX ORDER — PROPOFOL 10 MG/ML
INJECTION, EMULSION INTRAVENOUS PRN
Status: DISCONTINUED | OUTPATIENT
Start: 2020-11-12 | End: 2020-11-12 | Stop reason: SDUPTHER

## 2020-11-12 RX ORDER — LIDOCAINE HYDROCHLORIDE 10 MG/ML
INJECTION, SOLUTION INFILTRATION; PERINEURAL PRN
Status: DISCONTINUED | OUTPATIENT
Start: 2020-11-12 | End: 2020-11-12 | Stop reason: SDUPTHER

## 2020-11-12 RX ADMIN — PROPOFOL 130 MG: 10 INJECTION, EMULSION INTRAVENOUS at 08:44

## 2020-11-12 RX ADMIN — SODIUM CHLORIDE: 9 INJECTION, SOLUTION INTRAVENOUS at 07:47

## 2020-11-12 RX ADMIN — LIDOCAINE HYDROCHLORIDE 40 MG: 10 INJECTION, SOLUTION INFILTRATION; PERINEURAL at 08:44

## 2020-11-12 NOTE — ADDENDUM NOTE
Addendum  created 11/12/20 0105 by JEANNA Horne CRNA    Clinical Note Signed, Review and Sign - Signed

## 2020-11-12 NOTE — OP NOTE
Endoscopic Procedure Note    Patient: Dionicio Chilel : 1945  [facility] Rec#: [id number] Acc#: [id number]     Primary Care Provider Sho Jimenes MD    Endoscopist: Matteo Yan MD    Date of Procedure:  2020    Procedure:   1. EGD with biopsy  2. EGD with wire-guided dilation- 47 F    Indications:   1. Recheck of esophagitis     Anesthesia:  Sedation was administered by anesthesia who monitored the patient during the procedure. Estimated Blood Loss: minimal    Procedure:   After reviewing the patient's chart and obtaining informed consent, the patient was placed in the left lateral decubitus position. A forward-viewing Olympus endoscope was lubricated and inserted through the mouth into the oropharynx. Under direct visualization, the upper esophagus was intubated. The scope was advanced to the level of the third portion of duodenum. Scope was slowly withdrawn with careful inspection of the mucosal surfaces. The scope was retroflexed for inspection of the gastric fundus and incisura. Findings and maneuvers are listed in impression below. The patient tolerated the procedure well. The scope was removed. There were no immediate complications. Findings:   Esophagus: Abnormal: the previous changes of esophagitis appear much improved. There was a questionable ring at the GE junction- dilated. A guidewire was placed through the endoscope and the endoscope was removed. A 54 [employer] savory dilator was advanced down the length of the esophagus using a fixed-point wire technique. The dilator and guidewire were removed. The patient tolerated the procedure well. There is no hiatal hernia present. Stomach: Normal      Duodenum: Normal      IMPRESSION:  1. Well healed esophagitis w/ Schatzkis ring  2. S/p wire-guided dilation- 47 F     RECOMMENDATIONS:    1. Await path results, the patient will be contacted in 7-10 days with biopsy results.    2.  Continue PPI BID x 3 months, then decrease to daily   3. Carafate PRN  4. If biopsies show continued changes of Marie's, repeat EGD in one year. The results were discussed with the patient and family. A copy of the images obtained were given to the patient. Alyssa Giraldo am scribing for and in the presence of Dr. Josee Vargas MD.  Electronically signed by Vick Chung RN on 11/12/2020 at 8:25 AM    I personally performed the services described in this documentation as scribed by Leda Nash, and it appears accurate and complete.      Krystal Garvey MD  11/12/2020

## 2020-11-12 NOTE — H&P
Patient Name: Basil Baeur  : 1945  MRN: [id number]  DATE: 20    Allergies: Allergies   Allergen Reactions    Ciprofloxacin     Morphine      Very Restless        ENDOSCOPY  History and Physical    Procedure:    [] Diagnostic Colonoscopy       [] Screening Colonoscopy  [x] EGD      [] ERCP      [] EUS       [] Other    [x] Previous office notes/History and Physical reviewed from the patients chart. Please see EMR for further details of HPI. I have examined the patient's status immediately prior to the procedure and:      Indications/HPI:    []Abdominal Pain   []Cancer- GI/Lung     []Fhx of colon CA/polyps  []History of Polyps  [x]Barretts            []Melena  []Abnormal Imaging              []Dysphagia              []Persistent Pneumonia   []Anemia                            []Food Impaction        []History of Polyps  [] GI Bleed             []Pulmonary nodule/Mass   []Change in bowel habits [x]Heartburn/Reflux  []Rectal Bleed (BRBPR)  []Chest Pain - Non Cardiac []Heme (+) Stool []Ulcers  []Constipation  []Hemoptysis  []Varices  []Diarrhea  []Hypoxemia    []Nausea/Vomiting   []Screening   []Crohns/Colitis  []Other:     Anesthesia:   [x] MAC [] Moderate Sedation   [] General   [] None     ROS: 12 pt Review of Symptoms was negative unless mentioned above    Medications:   Prior to Admission medications    Medication Sig Start Date End Date Taking?  Authorizing Provider   levothyroxine (SYNTHROID) 50 MCG tablet Take of the 100 mcg Synthroid - use two 50s  Patient taking differently: Take 100 mcg by mouth Daily Take of the 100 mcg Synthroid - use two 50s 10/22/17  Yes Alana Feliz MD   omeprazole (PRILOSEC) 40 MG delayed release capsule Take 1 capsule by mouth 2 times daily (before meals) 20   Ritu Chi MD   sucralfate (CARAFATE) 1 GM/10ML suspension Take 10 mLs by mouth three times daily 20   Ritu Chi MD   olmesartan (BENICAR) 40 MG tablet Take 40 mg by mouth daily Historical Provider, MD   simvastatin (ZOCOR) 20 MG tablet Take 20 mg by mouth nightly     Historical Provider, MD       Past Medical History:  Past Medical History:   Diagnosis Date    Acute renal failure ([facility] [address] 07/29/2016    discharged 8/1/16; \"due to strong antibiotic and being dehydrated\"    Arthritis     Breast cancer ([facility] [address]     surgery only    Cancer ([facility] [address]     Breast cancer    Colon polyps     Diverticulitis     GERD (gastroesophageal reflux disease)     Hyperlipidemia     Hypertension     Joint pain     Thyroid condition        Past Surgical History:  Past Surgical History:   Procedure Laterality Date    ABDOMEN SURGERY      BLADDER REPAIR      BLEPHAROPLASTY      BREAST ENHANCEMENT SURGERY      BUNIONECTOMY      CATARACT REMOVAL Bilateral     [facility]  [facility]-    [facility]    COLONOSCOPY  04/29/2014    TA x 2, HP (5 yr)    COLONOSCOPY N/A 5/21/2019    Dr Rafael Church-questionable lipoma, diverticular disease-Tubular AP (-) dysplasia, 5 yr recall    HYSTERECTOMY      KNEE SURGERY Left 08/17/2016    MASTECTOMY Bilateral 1984    OVARY REMOVAL      PARTIAL HYSTERECTOMY      PATELLA SURGERY      THYROIDECTOMY, PARTIAL      TONSILLECTOMY AND ADENOIDECTOMY      TOTAL KNEE ARTHROPLASTY Left 8/17/2016    KNEE TOTAL ARTHROPLASTY performed by Oscar Cole MD at [address] [facility] N/A 8/27/2020    Dr CUONG Church-[id number] obstructing ring in GEJ, severe esophagitis, gastritis, (+) Marie's, indeterminate for dysplasia--1st dx, Repeat in 3 months       Social History:  Social History     Tobacco Use    Smoking status: Never Smoker    Smokeless tobacco: Never Used   Substance Use Topics    Alcohol use: No    Drug use: No       Vital Signs:   Vitals:    11/12/20 0742   BP: (!) 156/82   Pulse: 61   Resp: 20   Temp: 98.6 °F (37 °C)   SpO2: 98%        Physical Exam:  Cardiac:  [x]WNL  []Comments:  Pulmonary:  [x]WNL   []Comments:  Neuro/Mental Status:  [x]WNL []Comments:  Abdominal:  [x]WNL    []Comments:  Other:   []WNL  []Comments:    Informed Consent:  The risks and benefits of the procedure have been discussed with either the patient or if they cannot consent, their representative. Assessment:  Patient examined and appropriate for planned sedation and procedure. Plan:  Proceed with planned sedation and procedure as above. Bernadette Ren am scribing for and in the presence of Dr. Jose Carbajal MD.  Electronically signed by Rudy Rubio RN on 11/12/2020 at 8:08 AM    I personally performed the services described in this documentation as scribed by Asia Weinberg, and it appears accurate and complete.      Jose Carbajal MD  11/12/2020

## 2020-11-12 NOTE — ANESTHESIA PRE PROCEDURE
Department of Anesthesiology  Preprocedure Note       Name:  Britta Johnston   Age:  67 y.o.  :  1945                                          MRN:  [id number]         Date:  2020      Surgeon: Jeanne Marina):  Amelia Alonzo MD    Procedure: Procedure  EGD    Medications prior to admission:   Prior to Admission medications    Medication Sig Start Date End Date Taking? Authorizing Provider   omeprazole (PRILOSEC) 40 MG delayed release capsule Take 1 capsule by mouth 2 times daily (before meals) 20   Amelia Alonzo MD   sucralfate (CARAFATE) 1 GM/10ML suspension Take 10 mLs by mouth three times daily 20   Amelia Alonzo MD   olmesartan (BENICAR) 40 MG tablet Take 40 mg by mouth daily    Historical Provider, MD   levothyroxine (SYNTHROID) 50 MCG tablet Take of the 100 mcg Synthroid - use two 50s  Patient taking differently: Take 100 mcg by mouth Daily Take of the 100 mcg Synthroid - use two 50s 10/22/17   Adolfo Vargas MD   simvastatin (ZOCOR) 20 MG tablet Take 20 mg by mouth nightly     Historical Provider, MD       Current medications:    No current facility-administered medications for this visit. No current outpatient medications on file. Facility-Administered Medications Ordered in Other Visits   Medication Dose Route Frequency Provider Last Rate Last Dose    0.9 % sodium chloride infusion   Intravenous Continuous Amelia Alonzo  mL/hr at 20 0747         Allergies:     Allergies   Allergen Reactions    Ciprofloxacin     Morphine      Very Restless       Problem List:    Patient Active Problem List   Diagnosis Code    Irritable bowel syndrome with diarrhea K58.0    Diverticulosis K57.90    History of adenomatous polyp of colon Z86.010    Primary osteoarthritis of left knee M17.12    Essential hypertension I10    Hypothyroidism E03.9    Hyperlipidemia E78.5    Gastroesophageal reflux disease K21.9    Diverticulitis K57.92    Insomnia G47.00    History of colonic diverticulitis Z87.19    Lipoma of colon D17.5       Past Medical History:        Diagnosis Date    Acute renal failure ([facility] 07/29/2016    discharged 8/1/16; \"due to strong antibiotic and being dehydrated\"    Arthritis     Breast cancer ([facility] [address]     surgery only    Cancer ([facility] [address]     Breast cancer    Colon polyps     Diverticulitis     GERD (gastroesophageal reflux disease)     Hyperlipidemia     Hypertension     Joint pain     Thyroid condition        Past Surgical History:        Procedure Laterality Date    ABDOMEN SURGERY      BLADDER REPAIR      BLEPHAROPLASTY      BREAST ENHANCEMENT SURGERY      BUNIONECTOMY      CATARACT REMOVAL Bilateral     COLONOSCOPY  3-    BODNARCHUK    COLONOSCOPY  04/29/2014    TA x 2, HP (5 yr)    COLONOSCOPY N/A 5/21/2019    Dr Lu Church-questionable lipoma, diverticular disease-Tubular AP (-) dysplasia, 5 yr recall    HYSTERECTOMY      KNEE SURGERY Left 08/17/2016    MASTECTOMY Bilateral 1984    OVARY REMOVAL      PARTIAL HYSTERECTOMY      PATELLA SURGERY      THYROIDECTOMY, PARTIAL      TONSILLECTOMY AND ADENOIDECTOMY      TOTAL KNEE ARTHROPLASTY Left 8/17/2016    KNEE TOTAL ARTHROPLASTY performed by Una Grey MD at [facility] N/A 8/27/2020    Dr CUONG Church-w/kml-14P-Pojrngoqs obstructing ring in GEJ, severe esophagitis, gastritis, (+) Marie's, indeterminate for dysplasia--1st dx, Repeat in 3 months       Social History:    Social History     Tobacco Use    Smoking status: Never Smoker    Smokeless tobacco: Never Used   Substance Use Topics    Alcohol use: No                                Counseling given: Not Answered      Vital Signs (Current): There were no vitals filed for this visit.                                            BP Readings from Last 3 Encounters:   11/12/20 (!) 156/82   08/27/20 118/65   08/27/20 117/62       NPO Status: BMI:   Wt Readings from Last 3 Encounters:   11/12/20 181 lb (82.1 kg)   08/27/20 188 lb (85.3 kg)   08/04/20 190 lb (86.2 kg)     There is no height or weight on file to calculate BMI.    CBC:   Lab Results   Component Value Date    WBC 13.4 08/23/2018    RBC 4.88 08/23/2018    HGB 13.7 08/23/2018    HCT 42.4 08/23/2018    MCV 86.9 08/23/2018    RDW 14.1 08/23/2018     08/23/2018       CMP:   Lab Results   Component Value Date     08/23/2018    K 4.4 08/23/2018    CL 97 08/23/2018    CO2 24 08/23/2018    BUN 26 08/23/2018    CREATININE 1.4 08/23/2018    LABGLOM 37 08/23/2018    GLUCOSE 109 08/23/2018    PROT 7.6 08/23/2018    CALCIUM 10.1 08/23/2018    BILITOT 0.5 08/23/2018    ALKPHOS 72 08/23/2018    AST 20 08/23/2018    ALT 18 08/23/2018       POC Tests: No results for input(s): POCGLU, POCNA, POCK, POCCL, POCBUN, POCHEMO, POCHCT in the last 72 hours. Coags:   Lab Results   Component Value Date    PROTIME 14.1 08/01/2016    INR 1.09 08/01/2016    APTT 33.3 08/01/2016       HCG (If Applicable): No results found for: PREGTESTUR, PREGSERUM, HCG, HCGQUANT     ABGs: No results found for: PHART, PO2ART, BPE9JDF, VUT9DWU, BEART, A3NLTLFI     Type & Screen (If Applicable):  No results found for: LABABO, [address]    Anesthesia Evaluation  Patient summary reviewed and Nursing notes reviewed no history of anesthetic complications:   Airway: Mallampati: II  TM distance: >3 FB   Neck ROM: full  Mouth opening: < 3 FB Dental: normal exam         Pulmonary:Negative Pulmonary ROS and normal exam                               Cardiovascular:    (+) hypertension:, hyperlipidemia         Beta Blocker:  Dose within 24 Hrs      ROS comment: Normal stress test 8/6/2020     Neuro/Psych:   Negative Neuro/Psych ROS              GI/Hepatic/Renal:   (+) GERD:,          ROS comment: etoh use. Endo/Other:    (+) hypothyroidism::., malignancy/cancer (h/o breast cancer).                  Abdominal:           Vascular: negative vascular ROS. Anesthesia Plan      general and TIVA     ASA 3       Induction: intravenous. Anesthetic plan and risks discussed with patient.                       JEANNA Barker - CRNA   11/12/2020

## 2021-02-19 ENCOUNTER — IMMUNIZATION (OUTPATIENT)
Age: 76
End: 2021-02-19
Payer: MEDICARE

## 2021-02-19 PROCEDURE — 91300 COVID-19, PFIZER VACCINE 30MCG/0.3ML DOSE: CPT | Performed by: FAMILY MEDICINE

## 2021-02-19 PROCEDURE — 0002A COVID-19, PFIZER VACCINE 30MCG/0.3ML DOSE: CPT | Performed by: FAMILY MEDICINE

## 2021-03-12 ENCOUNTER — IMMUNIZATION (OUTPATIENT)
Age: 76
End: 2021-03-12
Payer: MEDICARE

## 2021-03-12 PROCEDURE — 91300 COVID-19, PFIZER VACCINE 30MCG/0.3ML DOSE: CPT | Performed by: FAMILY MEDICINE

## 2021-03-12 PROCEDURE — 0002A PR IMM ADMN SARSCOV2 30MCG/0.3ML DIL RECON 2ND DOSE: CPT | Performed by: FAMILY MEDICINE

## 2021-04-02 ENCOUNTER — OFFICE VISIT (OUTPATIENT)
Dept: VASCULAR SURGERY | Facility: CLINIC | Age: 76
End: 2021-04-02

## 2021-04-02 ENCOUNTER — HOSPITAL ENCOUNTER (OUTPATIENT)
Dept: ULTRASOUND IMAGING | Facility: HOSPITAL | Age: 76
Discharge: HOME | End: 2021-04-02
Admitting: SURGERY

## 2021-04-02 VITALS
BODY MASS INDEX: 32.33 KG/M2 | HEART RATE: 68 BPM | DIASTOLIC BLOOD PRESSURE: 74 MMHG | SYSTOLIC BLOOD PRESSURE: 134 MMHG | OXYGEN SATURATION: 95 % | WEIGHT: 189.4 LBS | HEIGHT: 64 IN

## 2021-04-02 DIAGNOSIS — I87.323 CHRONIC VENOUS HYPERTENSION (IDIOPATHIC) WITH INFLAMMATION OF BILATERAL LOWER EXTREMITY: ICD-10-CM

## 2021-04-02 DIAGNOSIS — E78.2 MIXED HYPERLIPIDEMIA: ICD-10-CM

## 2021-04-02 DIAGNOSIS — I10 ESSENTIAL HYPERTENSION: ICD-10-CM

## 2021-04-02 DIAGNOSIS — I65.23 BILATERAL CAROTID ARTERY STENOSIS: Primary | ICD-10-CM

## 2021-04-02 PROCEDURE — 93970 EXTREMITY STUDY: CPT

## 2021-04-02 PROCEDURE — 99214 OFFICE O/P EST MOD 30 MIN: CPT | Performed by: NURSE PRACTITIONER

## 2021-04-02 PROCEDURE — 93970 EXTREMITY STUDY: CPT | Performed by: SURGERY

## 2021-04-02 NOTE — PROGRESS NOTES
"04/02/2021       Arnulfo Wynn MD  [address] [facility] [place] [address]    Laura Conner  1945    Chief Complaint   Patient presents with   • Follow-up     6 month follo wup with VVI- Bilateral carotid artery stenosis - US Venous Doppler Lower Extremity Bilateral (duplex) completed 04/02/2021- Denies pain and stroke like symptoms.   • Smoking status     Never smoker   • Med Management     Went through list of meds from pt       Dear Arnulfo Wynn MD       HPI  I had the pleasure of seeing your patient Laura Conner in the office today.    As you recall, Laura Conner is a 75 y.o.  female who we are following for venous insufficiency and asymptomatic carotid occlusive disease.  She did undergo a left lower extremity radiofrequency ablation on 11/5/18 and right lower extremity radiofrequency ablation on 11/19/18.  She does not wear compression stockings some which has been helping.  She reports this is not overall bothersome.  She denies any strokelike symptoms.  She is maintained on Zocor.  She did have noninvasive testing performed today, which I did review in office.      Review of Systems   Constitutional: Negative.    HENT: Negative.    Eyes: Negative.    Respiratory: Negative.    Cardiovascular: Positive for leg swelling.   Gastrointestinal: Negative.    Endocrine: Negative.    Genitourinary: Negative.    Musculoskeletal: Positive for neck pain.   Skin: Negative.    Allergic/Immunologic: Negative.    Neurological: Negative.    Hematological: Negative.    Psychiatric/Behavioral: The patient is nervous/anxious.        /74 (BP Location: Right arm, Patient Position: Sitting, Cuff Size: Adult)   Pulse 68   Ht 162.6 cm (64\")   Wt 85.9 kg (189 lb 6.4 oz)   LMP  (LMP Unknown)   SpO2 95%   BMI 32.51 kg/m²    Physical Exam  Vitals and nursing note reviewed.   Constitutional:       General: She is not in acute distress.     Appearance: Normal appearance. She is well-developed. She is " obese. She is not diaphoretic.   HENT:      Head: Normocephalic and atraumatic.   Eyes:      General: No scleral icterus.     Pupils: Pupils are equal, round, and reactive to light.   Neck:      Thyroid: No thyromegaly.      Vascular: No carotid bruit or JVD.   Cardiovascular:      Rate and Rhythm: Normal rate and regular rhythm.      Pulses: Normal pulses.      Heart sounds: Normal heart sounds and S2 normal. No murmur heard.   No friction rub. No gallop.       Comments: Spider varicosities to lower extremities  Pulmonary:      Effort: Pulmonary effort is normal.      Breath sounds: Normal breath sounds.   Abdominal:      General: Bowel sounds are normal.      Palpations: Abdomen is soft.   Musculoskeletal:         General: Normal range of motion.      Cervical back: Normal range of motion and neck supple.   Skin:     General: Skin is warm and dry.   Neurological:      Mental Status: She is alert and oriented to person, place, and time.      Cranial Nerves: No cranial nerve deficit.   Psychiatric:         Behavior: Behavior normal.         Thought Content: Thought content normal.         Judgment: Judgment normal.         Diagnostic Data:    US Venous Doppler Lower Extremity Bilateral (duplex)    Result Date: 4/2/2021  Narrative: History: Swelling   Comments: Venous valvular insufficiency testing was performed in the bilateral lower extremities using duplex ultrasound with compression techniques.  The common femoral vein, superficial femoral, popliteal vein, posterior tibial vein, peroneal vein, greater saphenous vein, and lesser saphenous veins were interrogated.  On the right, the greater saphenous vein at the junction measured 7.2mm. From the proximal thigh to just below the knee the greater saphenous vein is closed from previous RFA. In the mid calf measured 2.5mm. At the ankle measured 2.2mm. There is greater than 0.5 seconds of reflux at the junction and from the mid calf to the ankle. The lesser saphenous  vein was not visualized. There is no evidence of DVT.  On the left, the greater saphenous vein at the junction measured 6.7mm. From the proximal thigh to just below the knee the greater saphenous vein is closed from previous RFA. In the mid calf measured 2.2mm. At the ankle measured 2.3mm. There is greater than 0.5 seconds of reflux at the junction and from the mid calf to the ankle. The lesser saphenous vein was not visualized. There is no evidence of DVT.      Impression: Impression: The patient has previous history of RFA's bilaterally. There is still remaining reflux from the mid calf to the ankles bilaterally.    This report was finalized on 04/02/2021 11:34 by Dr. Maco Bentley MD.      Patient Active Problem List   Diagnosis   • Diverticulitis   • Diverticulosis   • Gastroesophageal reflux disease   • History of colon polyps   • Hyperlipidemia   • Hypothyroidism   • Insomnia   • Irritable bowel syndrome with diarrhea   • Primary osteoarthritis of left knee   • BMI 33.0-33.9,adult   • Venous insufficiency   • Hypertension   • Hypercholesteremia   • Hypertriglyceridemia   • Bilateral carotid artery stenosis   • Chronic venous hypertension (idiopathic) with inflammation of bilateral lower extremity         ICD-10-CM ICD-9-CM   1. Bilateral carotid artery stenosis  I65.23 433.10     433.30   2. Mixed hyperlipidemia  E78.2 272.2   3. Essential hypertension  I10 401.9   4. Chronic venous hypertension (idiopathic) with inflammation of bilateral lower extremity  I87.323 459.32       Plan: After thoroughly evaluating Laura Conner, I believe the best course of action is to remain conservative from vascular surgery standpoint.  Overall, she is doing well.  She is wearing compression stockings but reports she does not sometimes.  They do help when she wears them.  I did review her testing which does show some remaining venous insufficiency in the mid calf to the ankles bilaterally.  Currently, she would just like  to continue with compression as she is doing okay.  We will see her back in 6 months with repeat noninvasive testing for continued surveillance, including carotid duplex.  I did discuss vascular risk factors as they pertain to the progression of vascular disease including controlling her hypertension and hyperlipidemia, which are stable on her current medication regimen.  Patient's Body mass index is 32.51 kg/m². BMI is above normal parameters. Recommendations include: educational material and exercise counseling.  The patient can continue taking her current medication regimen as previously planned.  This was all discussed in full with complete understanding.    Thank you for allowing me to participate in the care of your patient.  Please do not hesitate with any questions or concerns.  I will keep you aware of any further encounters with Laura AYON Faustosreedhar.        Sincerely yours,         RADHIKA Bryan, Arnulfo Zelaya MD

## 2021-06-11 ENCOUNTER — TRANSCRIBE ORDERS (OUTPATIENT)
Dept: ADMINISTRATIVE | Facility: HOSPITAL | Age: 76
End: 2021-06-11

## 2021-06-11 DIAGNOSIS — Z12.31 ENCOUNTER FOR SCREENING MAMMOGRAM FOR MALIGNANT NEOPLASM OF BREAST: Primary | ICD-10-CM

## 2021-07-09 ENCOUNTER — HOSPITAL ENCOUNTER (OUTPATIENT)
Dept: MAMMOGRAPHY | Facility: HOSPITAL | Age: 76
Discharge: HOME | End: 2021-07-09
Admitting: FAMILY MEDICINE

## 2021-07-09 DIAGNOSIS — Z12.31 ENCOUNTER FOR SCREENING MAMMOGRAM FOR MALIGNANT NEOPLASM OF BREAST: ICD-10-CM

## 2021-07-09 PROCEDURE — 77067 SCR MAMMO BI INCL CAD: CPT

## 2021-07-09 PROCEDURE — 77063 BREAST TOMOSYNTHESIS BI: CPT

## 2021-09-16 ENCOUNTER — TRANSCRIBE ORDERS (OUTPATIENT)
Dept: ADMINISTRATIVE | Facility: HOSPITAL | Age: 76
End: 2021-09-16

## 2021-09-16 DIAGNOSIS — I10 HYPERTENSION, UNSPECIFIED TYPE: ICD-10-CM

## 2021-09-16 DIAGNOSIS — Z01.812 ENCOUNTER FOR PREPROCEDURAL LABORATORY EXAMINATION: Primary | ICD-10-CM

## 2021-09-30 ENCOUNTER — LAB (OUTPATIENT)
Dept: LAB | Facility: HOSPITAL | Age: 76
End: 2021-09-30

## 2021-09-30 DIAGNOSIS — Z01.812 ENCOUNTER FOR PREPROCEDURAL LABORATORY EXAMINATION: ICD-10-CM

## 2021-09-30 DIAGNOSIS — I10 HYPERTENSION, UNSPECIFIED TYPE: ICD-10-CM

## 2021-09-30 LAB
25(OH)D3 SERPL-MCNC: 20.9 NG/ML
ALBUMIN SERPL-MCNC: 5.1 G/DL (ref 3.5–5.2)
ALBUMIN/GLOB SERPL: 1.7 G/DL
ALP SERPL-CCNC: 71 U/L (ref 39–117)
ALT SERPL W P-5'-P-CCNC: 14 U/L (ref 1–33)
ANION GAP SERPL CALCULATED.3IONS-SCNC: 12 MMOL/L (ref 5–15)
AST SERPL-CCNC: 18 U/L (ref 1–32)
BACTERIA UR QL AUTO: ABNORMAL /HPF
BASOPHILS # BLD AUTO: 0.07 10*3/MM3 (ref 0–0.2)
BASOPHILS NFR BLD AUTO: 0.9 % (ref 0–1.5)
BILIRUB SERPL-MCNC: 0.5 MG/DL (ref 0–1.2)
BILIRUB UR QL STRIP: NEGATIVE
BUN SERPL-MCNC: 21 MG/DL (ref 8–23)
BUN/CREAT SERPL: 14.9 (ref 7–25)
CALCIUM SPEC-SCNC: 10.3 MG/DL (ref 8.6–10.5)
CHLORIDE SERPL-SCNC: 100 MMOL/L (ref 98–107)
CLARITY UR: CLEAR
CO2 SERPL-SCNC: 28 MMOL/L (ref 22–29)
COLOR UR: YELLOW
CREAT SERPL-MCNC: 1.41 MG/DL (ref 0.57–1)
DEPRECATED RDW RBC AUTO: 41.6 FL (ref 37–54)
EOSINOPHIL # BLD AUTO: 0.36 10*3/MM3 (ref 0–0.4)
EOSINOPHIL NFR BLD AUTO: 4.8 % (ref 0.3–6.2)
ERYTHROCYTE [DISTWIDTH] IN BLOOD BY AUTOMATED COUNT: 13.1 % (ref 12.3–15.4)
GFR SERPL CREATININE-BSD FRML MDRD: 36 ML/MIN/1.73
GLOBULIN UR ELPH-MCNC: 3 GM/DL
GLUCOSE SERPL-MCNC: 114 MG/DL (ref 65–99)
GLUCOSE UR STRIP-MCNC: NEGATIVE MG/DL
HCT VFR BLD AUTO: 39.3 % (ref 34–46.6)
HGB BLD-MCNC: 13.1 G/DL (ref 12–15.9)
HGB UR QL STRIP.AUTO: NEGATIVE
HYALINE CASTS UR QL AUTO: ABNORMAL /LPF
IMM GRANULOCYTES # BLD AUTO: 0.01 10*3/MM3 (ref 0–0.05)
IMM GRANULOCYTES NFR BLD AUTO: 0.1 % (ref 0–0.5)
KETONES UR QL STRIP: NEGATIVE
LEUKOCYTE ESTERASE UR QL STRIP.AUTO: ABNORMAL
LYMPHOCYTES # BLD AUTO: 2.12 10*3/MM3 (ref 0.7–3.1)
LYMPHOCYTES NFR BLD AUTO: 28.5 % (ref 19.6–45.3)
MCH RBC QN AUTO: 29 PG (ref 26.6–33)
MCHC RBC AUTO-ENTMCNC: 33.3 G/DL (ref 31.5–35.7)
MCV RBC AUTO: 87.1 FL (ref 79–97)
MONOCYTES # BLD AUTO: 0.53 10*3/MM3 (ref 0.1–0.9)
MONOCYTES NFR BLD AUTO: 7.1 % (ref 5–12)
NEUTROPHILS NFR BLD AUTO: 4.35 10*3/MM3 (ref 1.7–7)
NEUTROPHILS NFR BLD AUTO: 58.6 % (ref 42.7–76)
NITRITE UR QL STRIP: NEGATIVE
NRBC BLD AUTO-RTO: 0 /100 WBC (ref 0–0.2)
PH UR STRIP.AUTO: 7 [PH] (ref 5–8)
PLATELET # BLD AUTO: 338 10*3/MM3 (ref 140–450)
PMV BLD AUTO: 11.2 FL (ref 6–12)
POTASSIUM SERPL-SCNC: 4 MMOL/L (ref 3.5–5.2)
PROT SERPL-MCNC: 8.1 G/DL (ref 6–8.5)
PROT UR QL STRIP: NEGATIVE
RBC # BLD AUTO: 4.51 10*6/MM3 (ref 3.77–5.28)
RBC # UR: ABNORMAL /HPF
REF LAB TEST METHOD: ABNORMAL
SODIUM SERPL-SCNC: 140 MMOL/L (ref 136–145)
SP GR UR STRIP: 1.01 (ref 1–1.03)
SQUAMOUS #/AREA URNS HPF: ABNORMAL /HPF
UROBILINOGEN UR QL STRIP: ABNORMAL
WBC # BLD AUTO: 7.44 10*3/MM3 (ref 3.4–10.8)
WBC UR QL AUTO: ABNORMAL /HPF

## 2021-09-30 PROCEDURE — 87086 URINE CULTURE/COLONY COUNT: CPT

## 2021-09-30 PROCEDURE — 80053 COMPREHEN METABOLIC PANEL: CPT

## 2021-09-30 PROCEDURE — 81001 URINALYSIS AUTO W/SCOPE: CPT

## 2021-09-30 PROCEDURE — 85025 COMPLETE CBC W/AUTO DIFF WBC: CPT

## 2021-09-30 PROCEDURE — 82306 VITAMIN D 25 HYDROXY: CPT

## 2021-09-30 PROCEDURE — 36415 COLL VENOUS BLD VENIPUNCTURE: CPT

## 2021-10-02 LAB — BACTERIA SPEC AEROBE CULT: NORMAL

## 2021-10-07 ENCOUNTER — OFFICE VISIT (OUTPATIENT)
Dept: GASTROENTEROLOGY | Age: 76
End: 2021-10-07
Payer: MEDICARE

## 2021-10-07 VITALS
BODY MASS INDEX: 31.65 KG/M2 | DIASTOLIC BLOOD PRESSURE: 70 MMHG | SYSTOLIC BLOOD PRESSURE: 118 MMHG | HEART RATE: 55 BPM | OXYGEN SATURATION: 97 % | WEIGHT: 185.4 LBS | HEIGHT: 64 IN

## 2021-10-07 DIAGNOSIS — K21.9 CHRONIC GERD: Primary | ICD-10-CM

## 2021-10-07 DIAGNOSIS — K22.70 BARRETT'S ESOPHAGUS DETERMINED BY BIOPSY: ICD-10-CM

## 2021-10-07 PROCEDURE — G8417 CALC BMI ABV UP PARAM F/U: HCPCS | Performed by: NURSE PRACTITIONER

## 2021-10-07 PROCEDURE — 1036F TOBACCO NON-USER: CPT | Performed by: NURSE PRACTITIONER

## 2021-10-07 PROCEDURE — G8400 PT W/DXA NO RESULTS DOC: HCPCS | Performed by: NURSE PRACTITIONER

## 2021-10-07 PROCEDURE — 99214 OFFICE O/P EST MOD 30 MIN: CPT | Performed by: NURSE PRACTITIONER

## 2021-10-07 PROCEDURE — 1090F PRES/ABSN URINE INCON ASSESS: CPT | Performed by: NURSE PRACTITIONER

## 2021-10-07 PROCEDURE — 1123F ACP DISCUSS/DSCN MKR DOCD: CPT | Performed by: NURSE PRACTITIONER

## 2021-10-07 PROCEDURE — 4040F PNEUMOC VAC/ADMIN/RCVD: CPT | Performed by: NURSE PRACTITIONER

## 2021-10-07 PROCEDURE — G8484 FLU IMMUNIZE NO ADMIN: HCPCS | Performed by: NURSE PRACTITIONER

## 2021-10-07 PROCEDURE — G8427 DOCREV CUR MEDS BY ELIG CLIN: HCPCS | Performed by: NURSE PRACTITIONER

## 2021-10-07 RX ORDER — FAMOTIDINE 20 MG/1
20 TABLET, FILM COATED ORAL 2 TIMES DAILY
COMMUNITY
End: 2022-10-10

## 2021-10-07 RX ORDER — OLMESARTAN MEDOXOMIL AND HYDROCHLOROTHIAZIDE 40/12.5 40; 12.5 MG/1; MG/1
1 TABLET ORAL DAILY
COMMUNITY

## 2021-10-07 ASSESSMENT — ENCOUNTER SYMPTOMS
VOMITING: 0
ABDOMINAL PAIN: 0
TROUBLE SWALLOWING: 0
SHORTNESS OF BREATH: 0
ABDOMINAL DISTENTION: 0
ANAL BLEEDING: 0
NAUSEA: 0
CONSTIPATION: 0
BLOOD IN STOOL: 0
CHOKING: 0
RECTAL PAIN: 0
DIARRHEA: 0
COUGH: 0

## 2021-10-07 NOTE — PROGRESS NOTES
Subjective:     Patient ID: Chandni Brice is a 67 y.o. female  PCP: Ivet Underwood MD  Referring Provider: No ref. provider found    HPI  Patient presents to the office today with the following complaints: Gastroesophageal Reflux      Pt seen today for EGD recall. Hx Marie's and chronic GERD. Previously followed by KRISTIE Delacruz. Last EGD 11/2020 (+) Marie's, (-) dysplasia. For the most part she states her symptoms are controlled. However, a few weeks ago she woke in the night with coughing and heartburn. Denies any issue with swallowing. She is taking Pepcid BID. Stopped PPI due to kidney function. Last Colonoscopy 2019 - TAP, diverticular disease, questionable lipoma  Family hx colon cancer and colon polyps - Paternal aunt    Assessment:     1. Chronic GERD    2. 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 No driving for 24 hours after procedure. Bring a  to procedure. No aspirin, NSAIDs, fish oil 5 days before procedure. I have discussed the benefits, alternatives, and risks (including bleeding, perforation and death)  for pursuing Endoscopy (EGD/Colonscopy/EUS/ERCP) with the patient and they are willing to continue. We also discussed the need for anesthesia, IV access, proper dietary changes, medication changes if necessary, and need for bowel prep (if ordered) prior to their Endoscopic procedure. They are aware they must have someone accompany them to their scheduled procedure to drive them home - they agree to the above and are willing to continue. Orders  No orders of the defined types were placed in this encounter. Medications  No orders of the defined types were placed in this encounter.         Patient History:     Past Medical History:   Diagnosis Date    Acute renal failure ([facility] [address] 07/29/2016    discharged 8/1/16; \"due to strong antibiotic and being dehydrated\"    Arthritis     Breast cancer ([facility] [address]     surgery only    Cancer [facility])     Breast cancer    Colon polyps     Diverticulitis     GERD (gastroesophageal reflux disease)     Hyperlipidemia     Hypertension     Joint pain     Thyroid condition        Past Surgical History:   Procedure Laterality Date    ABDOMEN SURGERY      BLADDER REPAIR      BLEPHAROPLASTY      BREAST ENHANCEMENT SURGERY      BUNIONECTOMY      CATARACT REMOVAL Bilateral     COLONOSCOPY  3-    OMAR    COLONOSCOPY  04/29/2014    TA x 2, HP (5 yr)    COLONOSCOPY N/A 5/21/2019    Dr Jigar Church-questionable lipoma, diverticular disease-Tubular AP (-) dysplasia, 5 yr recall    HYSTERECTOMY      KNEE SURGERY Left 08/17/2016    MASTECTOMY Bilateral 1984    OVARY REMOVAL      PARTIAL HYSTERECTOMY      PATELLA SURGERY      THYROIDECTOMY, PARTIAL      TONSILLECTOMY AND ADENOIDECTOMY      TOTAL KNEE ARTHROPLASTY Left 8/17/2016    KNEE TOTAL ARTHROPLASTY performed by Maile Saini MD at [facility] N/A 8/27/2020    Dr Jigar Church-w/cis-57P-Ugfwqxehv obstructing ring in GEJ, severe esophagitis, gastritis, (+) Marie's, indeterminate for dysplasia--1st dx, Repeat in 3 months    UPPER GASTROINTESTINAL ENDOSCOPY N/A 11/12/2020    Dr Jigar Church-w/fwb-12I-Jefyarvqkldv ring at the GEJ-Well healed esophagitis w/Schatzkis ring (+) Marie's, 1 yr recall       Family History   Problem Relation Age of Onset    Prostate Cancer Father     Breast Cancer Sister     Lung Cancer Brother     Colon Cancer Paternal Aunt     Colon Polyps Paternal Aunt     Stomach Cancer Other     Esophageal Cancer Neg Hx     Liver Cancer Neg Hx     Liver Disease Neg Hx     Rectal Cancer Neg Hx        Social History     Socioeconomic History    Marital status:      Spouse name: None    Number of children: None    Years of education: None    Highest education level: None   Occupational History    None   Tobacco Use    Smoking status: Never Smoker    Smokeless tobacco: Never Used   Vaping Use    Vaping Use: Never used   Substance and Sexual Activity    Alcohol use: No    Drug use: No    Sexual activity: None   Other Topics Concern    None   Social History Narrative    None     Social Determinants of Health     Financial Resource Strain:     Difficulty of Paying Living Expenses:    Food Insecurity:     Worried About Running Out of Food in the Last Year:     Ran Out of Food in the Last Year:    Transportation Needs:     Lack of Transportation (Medical):  Lack of Transportation (Non-Medical):    Physical Activity:     Days of Exercise per Week:     Minutes of Exercise per Session:    Stress:     Feeling of Stress :    Social Connections:     Frequency of Communication with Friends and Family:     Frequency of Social Gatherings with Friends and Family:     Attends [employer] Services:     Active Member of Clubs or Organizations:     Attends Club or Organization Meetings:     Marital Status:    Intimate Partner Violence:     Fear of Current or Ex-Partner:     Emotionally Abused:     Physically Abused:     Sexually Abused:        Current Outpatient Medications   Medication Sig Dispense Refill    olmesartan-hydroCHLOROthiazide (BENICAR HCT) 40-12.5 MG per tablet Take 1 tablet by mouth daily      famotidine (PEPCID) 20 MG tablet Take 20 mg by mouth 2 times daily      levothyroxine (SYNTHROID) 50 MCG tablet Take of the 100 mcg Synthroid - use two 50s (Patient taking differently: Take 75 mcg by mouth Daily ) 30 tablet 0    simvastatin (ZOCOR) 20 MG tablet Take 20 mg by mouth nightly        No current facility-administered medications for this visit. Allergies   Allergen Reactions    Ciprofloxacin Other (See Comments)     Kidney failure    Morphine      Very Restless       Review of Systems   Constitutional: Negative for activity change, appetite change, fatigue, fever and unexpected weight change.    HENT: Negative for trouble swallowing. Respiratory: Negative for cough, choking and shortness of breath. Cardiovascular: Negative for chest pain. Gastrointestinal: Negative for abdominal distention, abdominal pain, anal bleeding, blood in stool, constipation, diarrhea, nausea, rectal pain and vomiting. Heartburn   Allergic/Immunologic: Negative for food allergies. All other systems reviewed and are negative. Objective:     /70 (Site: Left Upper Arm, Position: Sitting, Cuff Size: Medium Adult)   Pulse 55   Ht 5' 4\" (1.626 m)   Wt 185 lb 6.4 oz (84.1 kg)   SpO2 97%   BMI 31.82 kg/m²     Physical Exam  Vitals reviewed. Constitutional:       General: She is not in acute distress. Appearance: She is well-developed. HENT:      Head: Normocephalic and atraumatic. Right Ear: External ear normal.      Left Ear: External ear normal.      Nose: Nose normal.      Comments: Mask on  Eyes:      General: No scleral icterus. Right eye: No discharge. Left eye: No discharge. Conjunctiva/sclera: Conjunctivae normal.      Pupils: Pupils are equal, round, and reactive to light. Cardiovascular:      Rate and Rhythm: Normal rate and regular rhythm. Heart sounds: Normal heart sounds. No murmur heard. Pulmonary:      Effort: Pulmonary effort is normal. No respiratory distress. Breath sounds: Normal breath sounds. No wheezing or rales. Abdominal:      General: Bowel sounds are normal. There is no distension. Palpations: Abdomen is soft. There is no mass. Tenderness: There is no abdominal tenderness. There is no guarding or rebound. Musculoskeletal:         General: Normal range of motion. Cervical back: Normal range of motion and neck supple. Skin:     General: Skin is warm and dry. Coloration: Skin is not pale. Neurological:      Mental Status: She is alert and oriented to person, place, and time.    Psychiatric:         Behavior: Behavior normal.

## 2021-10-07 NOTE — PATIENT INSTRUCTIONS
Schedule endoscopy    Nothing to eat or drink after midnight., the night before the procedure  You will not be able to drive for 24 hours after the procedure due to sedation. Must have a responsible adult, 25 year or older, to accompany you and drive you home. No aspirin, ibuprofen, naproxen, fish oil or vitamin E for 5 days before procedure. Continue current medications. If you are on blood thinners, clearance from the prescribing physician will be obtained before your procedure is scheduled. If biopsies are taken during the procedure they will be sent to a pathologist for analysis. You will be notified by mail of the pathology results in 2-3 weeks. Your physician may also schedule a follow up appointment with the nurse practitioner to discuss pathology, symptoms or to check if you have had any problems related to your procedure. If you prefer not to return to the office after your procedure, please discuss this with your physician on the day of your procedure. Miralax one teaspoon daily mixed as directed until you are having daily bowel movement. If no bm for 4 days increase to two teaspoons daily and gradually increase every 3-4 days until desired bowel movement is achieved. If you do not have bm for more than 4 days please use magnesim citrate, 1/2 bottle, to prevent becoming obstructed but continue to use miralax daily. If your stools become too loose or too frequent on daily dose you may reduce the amount taken daily. Make reductions gradually, every 3-4 days. Adjust dose, more or less, as needed for desired bm. You should have a soft bm at least every 3 days. Miralax is safe and effective for long term relief of chronic constipation. Drink 6-8 glasses of water daily. Regular exercise encouraged. High fiber diet recommended.

## 2021-11-29 ENCOUNTER — ANESTHESIA EVENT (OUTPATIENT)
Dept: OPERATING ROOM | Age: 76
End: 2021-11-29

## 2021-11-29 ENCOUNTER — HOSPITAL ENCOUNTER (OUTPATIENT)
Age: 76
Setting detail: OUTPATIENT SURGERY
Discharge: HOME | End: 2021-11-29
Attending: INTERNAL MEDICINE | Admitting: INTERNAL MEDICINE
Payer: MEDICARE

## 2021-11-29 ENCOUNTER — APPOINTMENT (OUTPATIENT)
Dept: OPERATING ROOM | Age: 76
End: 2021-11-29

## 2021-11-29 ENCOUNTER — HOSPITAL ENCOUNTER (OUTPATIENT)
Age: 76
Setting detail: SPECIMEN
Discharge: HOME | End: 2021-11-29
Payer: MEDICARE

## 2021-11-29 ENCOUNTER — ANESTHESIA (OUTPATIENT)
Dept: OPERATING ROOM | Age: 76
End: 2021-11-29

## 2021-11-29 VITALS — TEMPERATURE: 97 F | OXYGEN SATURATION: 95 % | SYSTOLIC BLOOD PRESSURE: 154 MMHG | DIASTOLIC BLOOD PRESSURE: 89 MMHG

## 2021-11-29 VITALS
OXYGEN SATURATION: 97 % | TEMPERATURE: 97 F | RESPIRATION RATE: 18 BRPM | BODY MASS INDEX: 30.73 KG/M2 | WEIGHT: 180 LBS | HEART RATE: 57 BPM | DIASTOLIC BLOOD PRESSURE: 79 MMHG | SYSTOLIC BLOOD PRESSURE: 149 MMHG | HEIGHT: 64 IN

## 2021-11-29 PROCEDURE — 88342 IMHCHEM/IMCYTCHM 1ST ANTB: CPT

## 2021-11-29 PROCEDURE — G8907 PT DOC NO EVENTS ON DISCHARG: HCPCS

## 2021-11-29 PROCEDURE — 43239 EGD BIOPSY SINGLE/MULTIPLE: CPT

## 2021-11-29 PROCEDURE — 43239 EGD BIOPSY SINGLE/MULTIPLE: CPT | Performed by: INTERNAL MEDICINE

## 2021-11-29 PROCEDURE — 88305 TISSUE EXAM BY PATHOLOGIST: CPT

## 2021-11-29 PROCEDURE — G8918 PT W/O PREOP ORDER IV AB PRO: HCPCS

## 2021-11-29 RX ORDER — OMEPRAZOLE 40 MG/1
40 CAPSULE, DELAYED RELEASE ORAL
Qty: 90 CAPSULE | Refills: 3 | Status: SHIPPED | OUTPATIENT
Start: 2021-11-29

## 2021-11-29 RX ORDER — SODIUM CHLORIDE 9 MG/ML
INJECTION, SOLUTION INTRAVENOUS CONTINUOUS
Status: DISCONTINUED | OUTPATIENT
Start: 2021-11-29 | End: 2021-11-29 | Stop reason: HOSPADM

## 2021-11-29 RX ORDER — PROPOFOL 10 MG/ML
INJECTION, EMULSION INTRAVENOUS PRN
Status: DISCONTINUED | OUTPATIENT
Start: 2021-11-29 | End: 2021-11-29 | Stop reason: SDUPTHER

## 2021-11-29 RX ORDER — LIDOCAINE HYDROCHLORIDE 10 MG/ML
INJECTION, SOLUTION EPIDURAL; INFILTRATION; INTRACAUDAL; PERINEURAL PRN
Status: DISCONTINUED | OUTPATIENT
Start: 2021-11-29 | End: 2021-11-29 | Stop reason: SDUPTHER

## 2021-11-29 RX ADMIN — SODIUM CHLORIDE: 9 INJECTION, SOLUTION INTRAVENOUS at 07:03

## 2021-11-29 RX ADMIN — LIDOCAINE HYDROCHLORIDE 50 MG: 10 INJECTION, SOLUTION EPIDURAL; INFILTRATION; INTRACAUDAL; PERINEURAL at 07:54

## 2021-11-29 RX ADMIN — PROPOFOL 200 MG: 10 INJECTION, EMULSION INTRAVENOUS at 07:54

## 2021-11-29 ASSESSMENT — LIFESTYLE VARIABLES: SMOKING_STATUS: 0

## 2021-11-29 NOTE — PROGRESS NOTES
DR. Rigo Mensah IN TO SEE PT/FAMILY TO DISCUSS RESULTS OF PROCEDURE. DISCHARGE INSTRUCTIONS GIVEN TO PT/FAMILY. BOTH VERBALIZED UNDERSTANDING  OF INSTRUCTIONS.

## 2021-11-29 NOTE — ANESTHESIA PRE PROCEDURE
Department of Anesthesiology  Preprocedure Note       Name:  Zana Richardson   Age:  67 y.o.  :  1945                                          MRN:  [id number]         Date:  2021      Surgeon: Mykel Salinas):  Ivonne Lynch MD    Procedure: Procedure  EGD    Medications prior to admission:   Prior to Admission medications    Medication Sig Start Date End Date Taking? Authorizing Provider   olmesartan-hydroCHLOROthiazide (BENICAR HCT) 40-12.5 MG per tablet Take 1 tablet by mouth daily    Historical Provider, MD   famotidine (PEPCID) 20 MG tablet Take 20 mg by mouth 2 times daily    Historical Provider, MD   levothyroxine (SYNTHROID) 50 MCG tablet Take of the 100 mcg Synthroid - use two 50s  Patient taking differently: Take 75 mcg by mouth Daily  10/22/17   Wesley Jhaveri MD   simvastatin (ZOCOR) 20 MG tablet Take 20 mg by mouth nightly     Historical Provider, MD       Current medications:    No current facility-administered medications for this visit. No current outpatient medications on file. Facility-Administered Medications Ordered in Other Visits   Medication Dose Route Frequency Provider Last Rate Last Admin    0.9 % sodium chloride infusion   IntraVENous Continuous Ivonne Lynch  mL/hr at 21 0703 New Bag at 21 0703       Allergies:     Allergies   Allergen Reactions    Ciprofloxacin Other (See Comments)     Kidney failure    Morphine      Very Restless       Problem List:    Patient Active Problem List   Diagnosis Code    Irritable bowel syndrome with diarrhea K58.0    Diverticulosis K57.90    History of adenomatous polyp of colon Z86.010    Primary osteoarthritis of left knee M17.12    Essential hypertension I10    Hypothyroidism E03.9    Hyperlipidemia E78.5    Gastroesophageal reflux disease K21.9    Diverticulitis K57.92    Insomnia G47.00    History of colonic diverticulitis Z87.19    Lipoma of colon D17.5       Past Medical History:        Diagnosis Date NPO Status:                                                                                 BMI:   Wt Readings from Last 3 Encounters:   11/29/21 180 lb (81.6 kg)   10/07/21 185 lb 6.4 oz (84.1 kg)   11/12/20 181 lb (82.1 kg)     There is no height or weight on file to calculate BMI.    CBC:   Lab Results   Component Value Date    WBC 13.4 08/23/2018    RBC 4.88 08/23/2018    HGB 13.7 08/23/2018    HCT 42.4 08/23/2018    MCV 86.9 08/23/2018    RDW 14.1 08/23/2018     08/23/2018       CMP:   Lab Results   Component Value Date     08/23/2018    K 4.4 08/23/2018    CL 97 08/23/2018    CO2 24 08/23/2018    BUN 26 08/23/2018    CREATININE 1.4 08/23/2018    LABGLOM 37 08/23/2018    GLUCOSE 109 08/23/2018    PROT 7.6 08/23/2018    CALCIUM 10.1 08/23/2018    BILITOT 0.5 08/23/2018    ALKPHOS 72 08/23/2018    AST 20 08/23/2018    ALT 18 08/23/2018       POC Tests: No results for input(s): POCGLU, POCNA, POCK, POCCL, POCBUN, POCHEMO, POCHCT in the last 72 hours.     Coags:   Lab Results   Component Value Date    PROTIME 14.1 08/01/2016    INR 1.09 08/01/2016    APTT 33.3 08/01/2016       HCG (If Applicable): No results found for: PREGTESTUR, PREGSERUM, HCG, HCGQUANT     ABGs: No results found for: PHART, PO2ART, WBZ9EPO, VPD5NFQ, BEART, I9PBHMCD     Type & Screen (If Applicable):  No results found for: LABABO, [address]    Anesthesia Evaluation  Patient summary reviewed and Nursing notes reviewed no history of anesthetic complications:   Airway: Mallampati: II  TM distance: >3 FB   Neck ROM: full  Mouth opening: < 3 FB Dental: normal exam         Pulmonary:Negative Pulmonary ROS and normal exam        (-) not a current smoker                           Cardiovascular:    (+) hypertension:, hyperlipidemia         Beta Blocker:  Dose within 24 Hrs      ROS comment: Normal stress test 8/6/2020     Neuro/Psych:   Negative Neuro/Psych ROS              GI/Hepatic/Renal:   (+) GERD:, renal disease (2016): ARF, ROS comment: etoh use. Endo/Other:    (+) hypothyroidism::., malignancy/cancer (h/o breast cancer). Abdominal:             Vascular: negative vascular ROS. Other Findings:               Anesthesia Plan      general and TIVA     ASA 3       Induction: intravenous. Anesthetic plan and risks discussed with patient. Plan discussed with CRNA.                   JEANNA Ortiz - SMITA   11/29/2021

## 2021-11-29 NOTE — H&P
Patient Name: Megan Hernández  : 1945  MRN: [id number]  DATE: 21    Allergies: Allergies   Allergen Reactions    Ciprofloxacin Other (See Comments)     Kidney failure    Morphine      Very Restless        ENDOSCOPY  History and Physical    Procedure:    [] Diagnostic Colonoscopy       [] Screening Colonoscopy  [x] EGD      [] ERCP      [] EUS       [] Other    [x] Previous office notes/History and Physical reviewed from the patients chart. Please see EMR for further details of HPI. I have examined the patient's status immediately prior to the procedure and:      Indications/HPI:    []Abdominal Pain   []Cancer- GI/Lung     []Fhx of colon CA/polyps  []History of Polyps  [x]Barretts            []Melena  []Abnormal Imaging              []Dysphagia              []Persistent Pneumonia   []Anemia                            []Food Impaction        []History of Polyps  [] GI Bleed             []Pulmonary nodule/Mass   []Change in bowel habits [x]Heartburn/Reflux  []Rectal Bleed (BRBPR)  []Chest Pain - Non Cardiac []Heme (+) Stool []Ulcers  []Constipation  []Hemoptysis  []Varices  []Diarrhea  []Hypoxemia    []Nausea/Vomiting   []Screening   []Crohns/Colitis  []Other:     Anesthesia:   [x] MAC [] Moderate Sedation   [] General   [] None     ROS: 12 pt Review of Symptoms was negative unless mentioned above    Medications:   Prior to Admission medications    Medication Sig Start Date End Date Taking?  Authorizing Provider   olmesartan-hydroCHLOROthiazide (BENICAR HCT) 40-12.5 MG per tablet Take 1 tablet by mouth daily   Yes Historical Provider, MD   famotidine (PEPCID) 20 MG tablet Take 20 mg by mouth 2 times daily   Yes Historical Provider, MD   levothyroxine (SYNTHROID) 50 MCG tablet Take of the 100 mcg Synthroid - use two 50s  Patient taking differently: Take 75 mcg by mouth Daily  10/22/17  Yes Shay Raman MD   simvastatin (ZOCOR) 20 MG tablet Take 20 mg by mouth nightly    Yes Historical MD Noam       Past Medical History:  Past Medical History:   Diagnosis Date    Acute renal failure ([facility] [address] [address] 07/29/2016    discharged 8/1/16; \"due to strong antibiotic and being dehydrated\"    Arthritis     Breast cancer ([facility] [address]     surgery only    Cancer ([facility]     Breast cancer    Colon polyps     Diverticulitis     GERD (gastroesophageal reflux disease)     Hyperlipidemia     Hypertension     Joint pain     Thyroid condition        Past Surgical History:  Past Surgical History:   Procedure Laterality Date    ABDOMEN SURGERY      BLADDER REPAIR      BLEPHAROPLASTY      BREAST ENHANCEMENT SURGERY      BUNIONECTOMY      CATARACT REMOVAL Bilateral     COLONOSCOPY  3-    BODNARCHUK    COLONOSCOPY  04/29/2014    TA x 2, HP (5 yr)    COLONOSCOPY N/A 5/21/2019    Dr Marc Church-questionable lipoma, diverticular disease-Tubular AP (-) dysplasia, 5 yr recall    HYSTERECTOMY      KNEE SURGERY Left 08/17/2016    MASTECTOMY Bilateral 1984    OVARY REMOVAL      PARTIAL HYSTERECTOMY      PATELLA SURGERY      THYROIDECTOMY, PARTIAL      TONSILLECTOMY AND ADENOIDECTOMY      TOTAL KNEE ARTHROPLASTY Left 8/17/2016    KNEE TOTAL ARTHROPLASTY performed by Memo Romo MD at [address] [place] [address] N/A 8/27/2020    Dr Marc Church-w/dwo-38T-Pwhzumtyd obstructing ring in GEJ, severe esophagitis, gastritis, (+) Marie's, indeterminate for dysplasia--1st dx, Repeat in 3 months    UPPER GASTROINTESTINAL ENDOSCOPY N/A 11/12/2020    Dr Marc Church-w/zam-79C-Doxbkxqeixis ring at the GEJ-Well healed esophagitis w/Schatzkis ring (+) Marie's, 1 yr recall       Social History:  Social History     Tobacco Use    Smoking status: Never Smoker    Smokeless tobacco: Never Used   Vaping Use    Vaping Use: Never used   Substance Use Topics    Alcohol use: No    Drug use: No       Vital Signs:   Vitals:    11/29/21 0658   BP: (!) 162/89   Pulse: 61   Resp: 16   Temp: 97.3 °F (36.3 °C)   SpO2: 97% Physical Exam:  Cardiac:  [x]WNL  []Comments:  Pulmonary:  [x]WNL   []Comments:  Neuro/Mental Status:  [x]WNL  []Comments:  Abdominal:  [x]WNL    []Comments:  Other:   []WNL  []Comments:    Informed Consent:  The risks and benefits of the procedure have been discussed with either the patient or if they cannot consent, their representative. Assessment:  Patient examined and appropriate for planned sedation and procedure. Plan:  Proceed with planned sedation and procedure as above. Terrell Fonseca am scribing for and in the presence of Dr. Jenni Robins MD.  Electronically signed by Mandy Motley RN on 11/29/2021 at 7:43 AM    I personally performed the services described in this documentation as scribed by Lashawn Lucero, and it appears accurate and complete.      Jenni Robins MD  11/29/2021

## 2021-11-29 NOTE — ANESTHESIA POSTPROCEDURE EVALUATION
Department of Anesthesiology  Postprocedure Note    Patient: Mario Brown  MRN: [id number]  YOB: 1945  Date of evaluation: 11/29/2021  Time:  8:05 AM     Procedure Summary     Date: 11/29/21 Room / Location: [facility] ENDO 02 / [address]    Anesthesia Start: Selvin Penta Anesthesia Stop: 0805    Procedure: EGD BIOPSY (N/A Esophagus) Diagnosis: ([place] [facility])    Surgeons: Honey Nation MD Responsible Provider: JEANNA Ortiz CRNA    Anesthesia Type: general, TIVA ASA Status: 3          Anesthesia Type: general, TIVA    Sophie Phase I:      Sophie Phase II:      Last vitals: Reviewed and per EMR flowsheets.        Anesthesia Post Evaluation    Patient location during evaluation: bedside  Patient participation: complete - patient participated  Level of consciousness: awake and alert  Pain score: 0  Airway patency: patent  Nausea & Vomiting: no nausea and no vomiting  Complications: no  Cardiovascular status: hemodynamically stable and blood pressure returned to baseline  Respiratory status: acceptable and room air  Hydration status: stable

## 2021-11-29 NOTE — OP NOTE
Endoscopic Procedure Note    Patient: Dc Brito : 1945  Med Rec#: [id number] Acc#: [id number]     Primary Care Provider Asael Hale MD  Referring Provider: JEANNA Weller    Endoscopist: Kori Valdez MD    Date of Procedure:  2021    Procedure:   1. EGD with biopsy    Indications:   1. Reflux   2. Hx of Marie's esophagus    Anesthesia:  Sedation was administered by anesthesia who monitored the patient during the procedure. Estimated Blood Loss: minimal    Procedure:   After reviewing the patient's chart and obtaining informed consent, the patient was placed in the left lateral decubitus position. A forward-viewing Olympus endoscope was lubricated and inserted through the mouth into the oropharynx. Under direct visualization, the upper esophagus was intubated. The scope was advanced to the level of the third portion of duodenum. Scope was slowly withdrawn with careful inspection of the mucosal surfaces. The scope was retroflexed for inspection of the gastric fundus and incisura. Findings and maneuvers are listed in impression below. The patient tolerated the procedure well. The scope was removed. There were no immediate complications. Findings:   Esophagus: abnormal: there is questionable mucosal changes of Marie's- biopsied. No ulcerations/lesions seen. There is no hiatal hernia present. Stomach: abnormal: Mild mucosal changes suggestive of gastritis noted -  Gastric biopsies were taken from the antrum and body to rule out Helicobacter pylori infection. Duodenum: normal      IMPRESSION:  1. Gastritis- biopsied  2. Hx of Marie's- biopsied     RECOMMENDATIONS:    1. Await path results, the patient will be contacted in 7-10 days with biopsy results. 2.  Recommend starting PPI daily, d/t hx of Marie's esophagus. 3.  Stop pepcid    The results were discussed with the patient and family. A copy of the images obtained were given to the patient.      I, Jorge Vail, am scribing for and in the presence of Dr. Casimir Schlatter, MD.  Electronically signed by Denise Aldrich RN on 11/29/2021 at 7:45 AM    I personally performed the services described in this documentation as scribed by Jorge Vail, and it appears accurate and complete.      Suzy Hathaway MD  11/29/2021

## 2022-01-04 ENCOUNTER — TELEPHONE (OUTPATIENT)
Dept: VASCULAR SURGERY | Facility: CLINIC | Age: 77
End: 2022-01-04

## 2022-01-04 DIAGNOSIS — I65.23 BILATERAL CAROTID ARTERY STENOSIS: Primary | ICD-10-CM

## 2022-01-04 NOTE — TELEPHONE ENCOUNTER
PT IS AWARE OF APPT CHANGE FROM 0404 TO 04/20 WITH DARLING AND TESTING ALSO WILL MAIL APPT REMINDER TO PT

## 2022-04-04 ENCOUNTER — APPOINTMENT (OUTPATIENT)
Dept: ULTRASOUND IMAGING | Facility: HOSPITAL | Age: 77
End: 2022-04-04

## 2022-04-19 ENCOUNTER — TELEPHONE (OUTPATIENT)
Dept: VASCULAR SURGERY | Facility: CLINIC | Age: 77
End: 2022-04-19

## 2022-04-19 NOTE — TELEPHONE ENCOUNTER
Left message reminding Mrs Conner of her appointments for Wednesday, April 20th, 2022. Reminded [facility] 1230 [facility] for 1 o'clock testing and follow up afterwards at 2 pm with Ebony GARRIDO.

## 2022-04-20 ENCOUNTER — OFFICE VISIT (OUTPATIENT)
Dept: VASCULAR SURGERY | Facility: CLINIC | Age: 77
End: 2022-04-20

## 2022-04-20 ENCOUNTER — HOSPITAL ENCOUNTER (OUTPATIENT)
Dept: ULTRASOUND IMAGING | Facility: HOSPITAL | Age: 77
Discharge: HOME | End: 2022-04-20
Admitting: NURSE PRACTITIONER

## 2022-04-20 VITALS
WEIGHT: 189 LBS | SYSTOLIC BLOOD PRESSURE: 130 MMHG | BODY MASS INDEX: 32.27 KG/M2 | HEIGHT: 64 IN | OXYGEN SATURATION: 97 % | HEART RATE: 61 BPM | DIASTOLIC BLOOD PRESSURE: 80 MMHG

## 2022-04-20 DIAGNOSIS — I65.23 BILATERAL CAROTID ARTERY STENOSIS: Primary | ICD-10-CM

## 2022-04-20 DIAGNOSIS — E78.2 MIXED HYPERLIPIDEMIA: ICD-10-CM

## 2022-04-20 DIAGNOSIS — I10 ESSENTIAL HYPERTENSION: ICD-10-CM

## 2022-04-20 DIAGNOSIS — I87.323 CHRONIC VENOUS HYPERTENSION (IDIOPATHIC) WITH INFLAMMATION OF BILATERAL LOWER EXTREMITY: ICD-10-CM

## 2022-04-20 DIAGNOSIS — I65.23 BILATERAL CAROTID ARTERY STENOSIS: ICD-10-CM

## 2022-04-20 PROCEDURE — 93880 EXTRACRANIAL BILAT STUDY: CPT | Performed by: SURGERY

## 2022-04-20 PROCEDURE — 93880 EXTRACRANIAL BILAT STUDY: CPT

## 2022-04-20 PROCEDURE — 99214 OFFICE O/P EST MOD 30 MIN: CPT | Performed by: NURSE PRACTITIONER

## 2022-04-20 RX ORDER — OMEPRAZOLE 40 MG/1
40 CAPSULE, DELAYED RELEASE ORAL DAILY
COMMUNITY
Start: 2021-11-29 | End: 2022-11-30

## 2022-04-20 RX ORDER — ALLOPURINOL 100 MG/1
100 TABLET ORAL DAILY
COMMUNITY
Start: 2022-02-07

## 2022-04-20 NOTE — PROGRESS NOTES
"4/20/2022        Arnulfo Wynn MD  [address] [facility] [place] [address]    Laura Conner  1945    Chief Complaint   Patient presents with   • Follow-up     1 Year Follow Up For Bilateral Carotid Artery Stenosis. Test [id number] US pad carotid bilateral. Patient denies any stroke like symptoms.    • Non Smoker     Patient is a Non Smoker    • Med Management     Verbally verified medications with patient        Dear Arnulfo Wynn MD       HPI  I had the pleasure of seeing your patient Laura Conner in the office today.    As you recall, Laura Conner is a 76 y.o.  female who we are following for venous insufficiency and asymptomatic carotid occlusive disease.  She did undergo bilateral lower extremity radiofrequency ablations in 2018.  She is doing well and reports no problems with her legs.  She also denies any strokelike symptoms.  She is maintained on Zocor.  She did have noninvasive testing performed today, which I did review in office.    Review of Systems   Constitutional: Negative.    HENT: Negative.    Eyes: Negative.    Respiratory: Negative.    Cardiovascular: Positive for leg swelling.   Gastrointestinal: Negative.    Endocrine: Negative.    Genitourinary: Negative.    Musculoskeletal: Positive for myalgias (muscles tender to thighs).   Skin: Negative.    Allergic/Immunologic: Negative.    Neurological: Negative.    Hematological: Negative.    Psychiatric/Behavioral: Negative.    All other systems reviewed and are negative.       /80 (BP Location: Right arm, Patient Position: Sitting, Cuff Size: Adult)   Pulse 61   Ht 162.6 cm (64\")   Wt 85.7 kg (189 lb)   LMP  (LMP Unknown)   SpO2 97%   BMI 32.44 kg/m²    Physical Exam  Vitals and nursing note reviewed.   Constitutional:       General: She is not in acute distress.     Appearance: Normal appearance. She is well-developed. She is not diaphoretic.   HENT:      Head: Normocephalic and atraumatic.   Eyes:      General: No " scleral icterus.     Pupils: Pupils are equal, round, and reactive to light.   Neck:      Thyroid: No thyromegaly.      Vascular: No carotid bruit or JVD.   Cardiovascular:      Rate and Rhythm: Normal rate and regular rhythm.      Pulses: Normal pulses.      Heart sounds: Normal heart sounds and S2 normal. No murmur heard.    No friction rub. No gallop.      Comments: Spider varicosities  Pulmonary:      Effort: Pulmonary effort is normal.      Breath sounds: Normal breath sounds.   Abdominal:      General: Bowel sounds are normal.      Palpations: Abdomen is soft.   Musculoskeletal:         General: Normal range of motion.      Cervical back: Normal range of motion and neck supple.   Skin:     General: Skin is warm and dry.   Neurological:      General: No focal deficit present.      Mental Status: She is alert and oriented to person, place, and time.      Cranial Nerves: No cranial nerve deficit.   Psychiatric:         Mood and Affect: Mood normal.         Behavior: Behavior normal.         Thought Content: Thought content normal.         Judgment: Judgment normal.          Diagnostic Data:  Noninvasive testing including a carotid duplex shows less than 50% carotid stenosis bilaterally with bilateral antegrade vertebral flow.    Patient Active Problem List   Diagnosis   • Diverticulitis   • Diverticulosis   • Gastroesophageal reflux disease   • History of colon polyps   • Hyperlipidemia   • Hypothyroidism   • Insomnia   • Irritable bowel syndrome with diarrhea   • Primary osteoarthritis of left knee   • BMI 33.0-33.9,adult   • Venous insufficiency   • Hypertension   • Hypercholesteremia   • Hypertriglyceridemia   • Bilateral carotid artery stenosis   • Chronic venous hypertension (idiopathic) with inflammation of bilateral lower extremity         ICD-10-CM ICD-9-CM   1. Bilateral carotid artery stenosis  I65.23 433.10     433.30   2. Chronic venous hypertension (idiopathic) with inflammation of bilateral lower  extremity  I87.323 459.32   3. Mixed hyperlipidemia  E78.2 272.2   4. Essential hypertension  I10 401.9       Plan: After thoroughly evaluating Laura Conner, I believe the best course of action is to remain conservative from vascular surgery standpoint.  Currently she is doing well denies any strokelike symptoms.  Her carotid duplex shows less than 50% carotid stenosis bilaterally.  Overall she reports her legs are doing well.  She does wear compression stockings at times.  Her previous testing showed some remaining venous insufficiency in the mid calf to the ankles bilaterally but she opted for conservative therapy with compression.  We will see her back in 1 year with repeat noninvasive testing for continued surveillance, including carotid duplex.  I would encourage compression stockings to aid with any swelling should she have.  I did discuss vascular risk factors as they pertain to the progression of vascular disease including controlling her hypertension and hyperlipidemia.  Her blood pressure stable on her current medications.  She is maintained on Zocor for hyperlipidemia. Patient's Body mass index is 32.44 kg/m². indicating that she is obese (BMI >30). Obesity-related health conditions include the following: hypertension, dyslipidemias, peripheral vascular disease and lower extremity venous stasis disease. Obesity is unchanged. BMI is is above average; BMI management plan is completed. We discussed portion control and increasing exercise..  The patient can continue taking her current medication regimen as previously planned.  This was all discussed in full with complete understanding.    Thank you for allowing me to participate in the care of your patient.  Please do not hesitate with any questions or concerns.  I will keep you aware of any further encounters with Laura Conner.        Sincerely yours,         RADHIKA Bryan, Arnulfo Zelaya MD

## 2022-05-07 ENCOUNTER — OFFICE VISIT (OUTPATIENT)
Dept: FAMILY MEDICINE CLINIC | Facility: CLINIC | Age: 77
End: 2022-05-07

## 2022-05-07 ENCOUNTER — TELEPHONE (OUTPATIENT)
Dept: FAMILY MEDICINE CLINIC | Facility: CLINIC | Age: 77
End: 2022-05-07

## 2022-05-07 ENCOUNTER — LAB (OUTPATIENT)
Dept: LAB | Facility: HOSPITAL | Age: 77
End: 2022-05-07

## 2022-05-07 VITALS
WEIGHT: 184 LBS | OXYGEN SATURATION: 97 % | SYSTOLIC BLOOD PRESSURE: 124 MMHG | BODY MASS INDEX: 31.41 KG/M2 | RESPIRATION RATE: 16 BRPM | HEIGHT: 64 IN | DIASTOLIC BLOOD PRESSURE: 74 MMHG | HEART RATE: 74 BPM | TEMPERATURE: 98.5 F

## 2022-05-07 DIAGNOSIS — J02.9 SORE THROAT: ICD-10-CM

## 2022-05-07 DIAGNOSIS — U07.1 COVID-19: ICD-10-CM

## 2022-05-07 DIAGNOSIS — M62.838 NECK MUSCLE SPASM: ICD-10-CM

## 2022-05-07 DIAGNOSIS — R50.9 FEVER, UNSPECIFIED FEVER CAUSE: ICD-10-CM

## 2022-05-07 DIAGNOSIS — R50.9 FEVER, UNSPECIFIED FEVER CAUSE: Primary | ICD-10-CM

## 2022-05-07 LAB
FLUAV AG NPH QL: NEGATIVE
FLUBV AG NPH QL IA: NEGATIVE
S PYO AG THROAT QL: NEGATIVE
SARS-COV-2 RNA PNL SPEC NAA+PROBE: DETECTED

## 2022-05-07 PROCEDURE — 87880 STREP A ASSAY W/OPTIC: CPT

## 2022-05-07 PROCEDURE — 87635 SARS-COV-2 COVID-19 AMP PRB: CPT

## 2022-05-07 PROCEDURE — 99214 OFFICE O/P EST MOD 30 MIN: CPT | Performed by: NURSE PRACTITIONER

## 2022-05-07 PROCEDURE — 87081 CULTURE SCREEN ONLY: CPT

## 2022-05-07 PROCEDURE — 87804 INFLUENZA ASSAY W/OPTIC: CPT

## 2022-05-07 RX ORDER — CYCLOBENZAPRINE HCL 10 MG
10 TABLET ORAL 3 TIMES DAILY PRN
Qty: 90 TABLET | Refills: 1 | Status: SHIPPED | OUTPATIENT
Start: 2022-05-07

## 2022-05-07 NOTE — TELEPHONE ENCOUNTER
Spoke to patient and informed her that covid was positive and to quarentine for 5 days.  She will let us know on Monday if she is inetrested in infusion.

## 2022-05-07 NOTE — PROGRESS NOTES
Patient is positive for COVID.   Flu and Strep negative.   Discussed quarantine.   If she is interested in the infusion, will check on this monday

## 2022-05-07 NOTE — TELEPHONE ENCOUNTER
----- Message from RADHIKA Cary sent at 5/7/2022 11:29 AM CDT -----  Patient is positive for COVID.   Flu and Strep negative.   Discussed quarantine.   If she is interested in the infusion, will check on this monday

## 2022-05-07 NOTE — PROGRESS NOTES
"Chief Complaint  Sore Throat (She states her symptoms started two days ago.) and Generalized Body Aches    Subjective    History of Present Illness      Patient presents to [facility] for   Sore Throat She states her symptoms started two days ago.     Generalized Body Aches               I have reviewed and agree with the HPI information as above.  Tiffany Mckeon, APRN     Objective   Vital Signs:   /74   Pulse 74   Temp 98.5 °F (36.9 °C)   Resp 16   Ht 162.6 cm (64\")   Wt 83.5 kg (184 lb)   SpO2 97%   BMI 31.58 kg/m²       Physical Exam  Vitals and nursing note reviewed.   Constitutional:       Appearance: Normal appearance.   HENT:      Head: Normocephalic and atraumatic.      Right Ear: Tympanic membrane is erythematous and bulging.      Left Ear: Tympanic membrane is erythematous and bulging.      Nose: Congestion present.      Mouth/Throat:      Pharynx: Posterior oropharyngeal erythema present.      Tonsils: Tonsillar exudate present.   Cardiovascular:      Rate and Rhythm: Normal rate and regular rhythm.      Pulses: Normal pulses.      Heart sounds: Normal heart sounds.   Pulmonary:      Effort: Pulmonary effort is normal.   Musculoskeletal:         General: Normal range of motion.      Cervical back: Normal range of motion and neck supple.   Skin:     General: Skin is warm and dry.   Neurological:      General: No focal deficit present.      Mental Status: She is alert and oriented to person, place, and time.   Psychiatric:         Mood and Affect: Mood normal.         Behavior: Behavior normal.          [place]-7:      PHQ-2 Depression Screening  Little interest or pleasure in doing things? 0-->not at all   Feeling down, depressed, or hopeless? 0-->not at all   PHQ-2 Total Score 0     PHQ-9 Depression Screening  Little interest or pleasure in doing things? 0-->not at all   Feeling down, depressed, or hopeless? 0-->not at all   Trouble falling or staying asleep, " or sleeping too much?     Feeling tired or having little energy?     Poor appetite or overeating?     Feeling bad about yourself - or that you are a failure or have let yourself or your family down?     Trouble concentrating on things, such as reading the newspaper or watching television?     Moving or speaking so slowly that other people could have noticed? Or the opposite - being so fidgety or restless that you have been moving around a lot more than usual?     Thoughts that you would be better off dead, or of hurting yourself in some way?     PHQ-9 Total Score 0   If you checked off any problems, how difficult have these problems made it for you to do your work, take care of things at home, or get along with other people?        Result Review  Data Reviewed:                   Assessment and Plan      Problem List Items Addressed This Visit    None     Visit Diagnoses     Fever, unspecified fever cause    -  Primary    Relevant Orders    Influenza Antigen, Rapid - Swab, Nasopharynx (Completed)    Rapid Strep A Screen - Swab, Throat (Completed)    COVID PRE-OP / PRE-PROCEDURE SCREENING ORDER (NO ISOLATION) - Swab, Nasal Cavity (Completed)    Sore throat        Relevant Orders    Influenza Antigen, Rapid - Swab, Nasopharynx (Completed)    Rapid Strep A Screen - Swab, Throat (Completed)    COVID PRE-OP / PRE-PROCEDURE SCREENING ORDER (NO ISOLATION) - Swab, Nasal Cavity (Completed)    Neck muscle spasm        Relevant Medications    cyclobenzaprine (FLEXERIL) 10 MG tablet    COVID-19            Patient has been sick since Thursday with body aches and sore throat.   Patient was COVID positive. Discussed quarantine precautions. Can check on the infusion on Monday if she is interested.   Patient would also like a refill on her muscle relaxer.         Follow Up   Return if symptoms worsen or fail to improve.  Patient was given instructions and counseling regarding her condition or for health maintenance advice. Please see  specific information pulled into the AVS if appropriate.

## 2022-05-09 ENCOUNTER — TELEPHONE (OUTPATIENT)
Dept: FAMILY MEDICINE CLINIC | Facility: CLINIC | Age: 77
End: 2022-05-09

## 2022-05-09 LAB — BACTERIA SPEC AEROBE CULT: NORMAL

## 2022-05-09 NOTE — TELEPHONE ENCOUNTER
Pt called this am and does state she wants Covid infusion if available. Contacted Freddy at [facility] infusion center and she scheduled pt for tomorrow 5/10/2022 at 12:45 pm. Pt aware of this date/time and is aware to call from ER when she arrives. Order faxed to infusion center.

## 2022-05-10 ENCOUNTER — HOSPITAL ENCOUNTER (OUTPATIENT)
Dept: INFUSION THERAPY | Facility: HOSPITAL | Age: 77
Discharge: HOME | End: 2022-05-10
Admitting: FAMILY MEDICINE

## 2022-05-10 VITALS
OXYGEN SATURATION: 98 % | RESPIRATION RATE: 17 BRPM | HEART RATE: 53 BPM | SYSTOLIC BLOOD PRESSURE: 107 MMHG | TEMPERATURE: 98 F | DIASTOLIC BLOOD PRESSURE: 56 MMHG

## 2022-05-10 PROBLEM — U07.1 COVID-19: Status: ACTIVE | Noted: 2022-05-10

## 2022-05-10 PROCEDURE — 25010000002 INJECTION, BEBTELOVIMAB, 175 MG: Performed by: FAMILY MEDICINE

## 2022-05-10 PROCEDURE — M0222 HC INJECTION BEBTELOVIMAB: HCPCS | Performed by: FAMILY MEDICINE

## 2022-05-10 RX ORDER — BEBTELOVIMAB 87.5 MG/ML
175 INJECTION, SOLUTION INTRAVENOUS ONCE
Status: COMPLETED | OUTPATIENT
Start: 2022-05-10 | End: 2022-05-10

## 2022-05-10 RX ORDER — METHYLPREDNISOLONE SODIUM SUCCINATE 125 MG/2ML
125 INJECTION, POWDER, LYOPHILIZED, FOR SOLUTION INTRAMUSCULAR; INTRAVENOUS ONCE AS NEEDED
Status: DISCONTINUED | OUTPATIENT
Start: 2022-05-10 | End: 2022-05-12 | Stop reason: HOSPADM

## 2022-05-10 RX ORDER — EPINEPHRINE 1 MG/ML
0.3 INJECTION, SOLUTION, CONCENTRATE INTRAVENOUS AS NEEDED
OUTPATIENT
Start: 2022-05-10

## 2022-05-10 RX ORDER — METHYLPREDNISOLONE SODIUM SUCCINATE 125 MG/2ML
125 INJECTION, POWDER, LYOPHILIZED, FOR SOLUTION INTRAMUSCULAR; INTRAVENOUS AS NEEDED
OUTPATIENT
Start: 2022-05-10

## 2022-05-10 RX ORDER — DIPHENHYDRAMINE HCL 50 MG
50 CAPSULE ORAL ONCE AS NEEDED
OUTPATIENT
Start: 2022-05-10

## 2022-05-10 RX ORDER — EPINEPHRINE 0.3 MG/.3ML
0.3 INJECTION SUBCUTANEOUS ONCE AS NEEDED
Status: DISCONTINUED | OUTPATIENT
Start: 2022-05-10 | End: 2022-05-12 | Stop reason: HOSPADM

## 2022-05-10 RX ORDER — DIPHENHYDRAMINE HYDROCHLORIDE 50 MG/ML
50 INJECTION INTRAMUSCULAR; INTRAVENOUS ONCE AS NEEDED
Status: DISCONTINUED | OUTPATIENT
Start: 2022-05-10 | End: 2022-05-12 | Stop reason: HOSPADM

## 2022-05-10 RX ORDER — DIPHENHYDRAMINE HCL 50 MG
50 CAPSULE ORAL ONCE AS NEEDED
Status: DISCONTINUED | OUTPATIENT
Start: 2022-05-10 | End: 2022-05-12 | Stop reason: HOSPADM

## 2022-05-10 RX ORDER — SODIUM CHLORIDE 9 MG/ML
30 INJECTION, SOLUTION INTRAVENOUS ONCE
OUTPATIENT
Start: 2022-05-10 | End: 2022-05-10

## 2022-05-10 RX ORDER — BEBTELOVIMAB 87.5 MG/ML
175 INJECTION, SOLUTION INTRAVENOUS ONCE
OUTPATIENT
Start: 2022-05-10

## 2022-05-10 RX ORDER — DIPHENHYDRAMINE HYDROCHLORIDE 50 MG/ML
50 INJECTION INTRAMUSCULAR; INTRAVENOUS ONCE AS NEEDED
OUTPATIENT
Start: 2022-05-10

## 2022-05-10 RX ORDER — SODIUM CHLORIDE 9 MG/ML
30 INJECTION, SOLUTION INTRAVENOUS ONCE
Status: COMPLETED | OUTPATIENT
Start: 2022-05-10 | End: 2022-05-10

## 2022-05-10 RX ADMIN — BEBTELOVIMAB 175 MG: 87.5 INJECTION, SOLUTION INTRAVENOUS at 13:25

## 2022-05-10 RX ADMIN — SODIUM CHLORIDE 30 ML: 9 INJECTION, SOLUTION INTRAVENOUS at 13:27

## 2022-05-10 NOTE — CODE DOCUMENTATION
1259 Here for Bebtelovimab infusion complains of cough, dizziness, and fatigue.-Mary ANDERSON RN    1431 Patient tolerated infusion without problems, denies signs or symptoms of a reaction. Patient is stable and discharged home.-Mary ANDERSON RN

## 2022-06-08 ENCOUNTER — TRANSCRIBE ORDERS (OUTPATIENT)
Dept: ADMINISTRATIVE | Facility: HOSPITAL | Age: 77
End: 2022-06-08

## 2022-06-08 DIAGNOSIS — Z78.0 MENOPAUSE: Primary | ICD-10-CM

## 2022-06-08 DIAGNOSIS — Z12.31 SCREENING MAMMOGRAM, ENCOUNTER FOR: Primary | ICD-10-CM

## 2022-06-08 DIAGNOSIS — M81.0 OSTEOPOROSIS, POST-MENOPAUSAL: ICD-10-CM

## 2022-07-11 ENCOUNTER — HOSPITAL ENCOUNTER (OUTPATIENT)
Dept: MAMMOGRAPHY | Facility: HOSPITAL | Age: 77
Discharge: HOME | End: 2022-07-11

## 2022-07-11 ENCOUNTER — HOSPITAL ENCOUNTER (OUTPATIENT)
Dept: BONE DENSITY | Facility: HOSPITAL | Age: 77
Discharge: HOME | End: 2022-07-11

## 2022-07-11 DIAGNOSIS — Z78.0 MENOPAUSE: ICD-10-CM

## 2022-07-11 DIAGNOSIS — M81.0 OSTEOPOROSIS, POST-MENOPAUSAL: ICD-10-CM

## 2022-07-11 DIAGNOSIS — Z12.31 SCREENING MAMMOGRAM, ENCOUNTER FOR: ICD-10-CM

## 2022-07-11 PROCEDURE — 77067 SCR MAMMO BI INCL CAD: CPT

## 2022-07-11 PROCEDURE — 77063 BREAST TOMOSYNTHESIS BI: CPT

## 2022-07-11 PROCEDURE — 77080 DXA BONE DENSITY AXIAL: CPT

## 2022-08-16 NOTE — Clinical Note
Patient Class: Observation [104]   REQUIRED: Diagnosis: Diverticulitis large intestine w/o perforation or abscess w/bleeding [376427]   Estimated Length of Stay: Estimated stay of less than 2 midnights   Future Attending Provider: Xi Cleaning [[id number]]   Admitting Provider: Xi Cleaning [[id number]]   Telemetry Bed Required?: Yes Pt returns from CT/XRAy, reports her \"pain in her side is the same however her restless legs is really her main problem and would like a ropinerol/requip\". MD updated. VSS. Call light in reach, pending orders.

## 2022-08-31 ENCOUNTER — OFFICE VISIT (OUTPATIENT)
Dept: OBSTETRICS AND GYNECOLOGY | Facility: CLINIC | Age: 77
End: 2022-08-31

## 2022-08-31 VITALS
SYSTOLIC BLOOD PRESSURE: 128 MMHG | HEIGHT: 64 IN | DIASTOLIC BLOOD PRESSURE: 64 MMHG | WEIGHT: 184 LBS | BODY MASS INDEX: 31.41 KG/M2

## 2022-08-31 DIAGNOSIS — N90.4 LICHEN SCLEROSUS OF FEMALE GENITALIA: Primary | ICD-10-CM

## 2022-08-31 DIAGNOSIS — Z78.9 NON-SMOKER: ICD-10-CM

## 2022-08-31 DIAGNOSIS — Z85.3 HISTORY OF BREAST CANCER: ICD-10-CM

## 2022-08-31 DIAGNOSIS — N89.8 VAGINAL DRYNESS: ICD-10-CM

## 2022-08-31 DIAGNOSIS — N89.8 VAGINAL IRRITATION: ICD-10-CM

## 2022-08-31 PROCEDURE — 99204 OFFICE O/P NEW MOD 45 MIN: CPT | Performed by: OBSTETRICS & GYNECOLOGY

## 2022-08-31 RX ORDER — CONJUGATED ESTROGENS 0.62 MG/G
CREAM VAGINAL DAILY
Qty: 30 G | Refills: 3 | Status: CANCELLED | OUTPATIENT
Start: 2022-08-31

## 2022-08-31 RX ORDER — LEVOTHYROXINE SODIUM 75 MCG
75 TABLET ORAL DAILY
COMMUNITY
Start: 2022-08-09

## 2022-08-31 RX ORDER — CLOBETASOL PROPIONATE 0.5 MG/G
1 CREAM TOPICAL 2 TIMES DAILY
Qty: 15 G | Refills: 1 | Status: SHIPPED | OUTPATIENT
Start: 2022-08-31 | End: 2022-11-30 | Stop reason: SDUPTHER

## 2022-08-31 RX ORDER — NITROFURANTOIN 25; 75 MG/1; MG/1
100 CAPSULE ORAL 2 TIMES DAILY
COMMUNITY
Start: 2022-08-26 | End: 2022-11-30

## 2022-08-31 NOTE — PROGRESS NOTES
"Chief Complaint  Vaginal Pain (Pt here c/o coital pain as well as vaginal dryness, has hx breast cancer in 1984, last mammogram and DEXA 07/11/2022)    Subjective        Laura Conner presents to [facility]  Patient presents as a new patient, referred by primary care  She has a long history of vaginal dryness, irritation, and pain with intercourse  She does have a history of breast cancer  She is otherwise without complaints      Objective   Vital Signs:  /64 (BP Location: Right arm, Patient Position: Sitting, Cuff Size: Adult)   Ht 162.6 cm (64\")   Wt 83.5 kg (184 lb)   BMI 31.58 kg/m²   Estimated body mass index is 31.58 kg/m² as calculated from the following:    Height as of this encounter: 162.6 cm (64\").    Weight as of this encounter: 83.5 kg (184 lb).          Physical Exam  Vitals and nursing note reviewed. Exam conducted with a chaperone present.   Constitutional:       Appearance: Normal appearance.   HENT:      Head: Normocephalic and atraumatic.   Abdominal:      General: Abdomen is flat. Bowel sounds are normal.      Palpations: Abdomen is soft.   Genitourinary:     Comments: External genitalia with an obvious pattern consistent with lichen sclerosis.  No abnormal masses or lesions.  Bimanual exam is unremarkable.  Musculoskeletal:         General: Normal range of motion.      Cervical back: Normal range of motion and neck supple.   Skin:     General: Skin is warm and dry.   Neurological:      General: No focal deficit present.      Mental Status: She is alert and oriented to person, place, and time. Mental status is at baseline.   Psychiatric:         Mood and Affect: Mood normal.         Behavior: Behavior normal.         Thought Content: Thought content normal.         Judgment: Judgment normal.        Result Review :                Assessment and Plan   Diagnoses and all orders for this visit:    1. Lichen sclerosus of female genitalia (Primary)    2. Vaginal " irritation    3. Vaginal dryness    4. History of breast cancer    5. Non-smoker    Other orders  -     clobetasol (Temovate) 0.05 % cream; Apply 1 application topically to the appropriate area as directed 2 (Two) Times a Day.  Dispense: 15 g; Refill: 1             Follow Up   Return in about 3 months (around 11/30/2022) for Med Check, with me.  Patient was given instructions and counseling regarding her condition or for health maintenance advice. Please see specific information pulled into the AVS if appropriate.   Trial of Temovate  Could consider Vagifem given lack of systemic absorption if she continues with other symptoms such as vaginal dryness    Jovanny Claros MD

## 2022-10-05 ENCOUNTER — TELEPHONE (OUTPATIENT)
Dept: PODIATRY | Facility: CLINIC | Age: 77
End: 2022-10-05

## 2022-10-05 NOTE — TELEPHONE ENCOUNTER
CALLED PATIENT TO INFORM HER THAT 1 YEAR FOLLOW UP APPOINTMENT WITH [facility] HAS BEEN MOVED UP DUE TO SPRING BREAK. NEW U/S APPOINTMENT IS 4/17/23 @ 10:30AM FOLLOWED UP BY 1:15PM APPOINTMENT WITH [facility]. PATIENT CONFIRMED.

## 2022-10-10 ENCOUNTER — OFFICE VISIT (OUTPATIENT)
Dept: GASTROENTEROLOGY | Age: 77
End: 2022-10-10
Payer: MEDICARE

## 2022-10-10 VITALS
WEIGHT: 190 LBS | OXYGEN SATURATION: 96 % | HEART RATE: 81 BPM | SYSTOLIC BLOOD PRESSURE: 120 MMHG | BODY MASS INDEX: 32.44 KG/M2 | DIASTOLIC BLOOD PRESSURE: 66 MMHG | HEIGHT: 64 IN

## 2022-10-10 DIAGNOSIS — K57.92 DIVERTICULITIS: Primary | ICD-10-CM

## 2022-10-10 DIAGNOSIS — K22.70 BARRETT'S ESOPHAGUS DETERMINED BY BIOPSY: ICD-10-CM

## 2022-10-10 DIAGNOSIS — K21.9 CHRONIC GERD: ICD-10-CM

## 2022-10-10 PROCEDURE — G8400 PT W/DXA NO RESULTS DOC: HCPCS | Performed by: NURSE PRACTITIONER

## 2022-10-10 PROCEDURE — G8484 FLU IMMUNIZE NO ADMIN: HCPCS | Performed by: NURSE PRACTITIONER

## 2022-10-10 PROCEDURE — 1123F ACP DISCUSS/DSCN MKR DOCD: CPT | Performed by: NURSE PRACTITIONER

## 2022-10-10 PROCEDURE — G8419 CALC BMI OUT NRM PARAM NOF/U: HCPCS | Performed by: NURSE PRACTITIONER

## 2022-10-10 PROCEDURE — G8427 DOCREV CUR MEDS BY ELIG CLIN: HCPCS | Performed by: NURSE PRACTITIONER

## 2022-10-10 PROCEDURE — 1036F TOBACCO NON-USER: CPT | Performed by: NURSE PRACTITIONER

## 2022-10-10 PROCEDURE — 1090F PRES/ABSN URINE INCON ASSESS: CPT | Performed by: NURSE PRACTITIONER

## 2022-10-10 PROCEDURE — 99214 OFFICE O/P EST MOD 30 MIN: CPT | Performed by: NURSE PRACTITIONER

## 2022-10-10 RX ORDER — METRONIDAZOLE 500 MG/1
500 TABLET ORAL 3 TIMES DAILY
Qty: 30 TABLET | Refills: 0 | Status: SHIPPED | OUTPATIENT
Start: 2022-10-10 | End: 2022-10-13

## 2022-10-10 RX ORDER — ONDANSETRON 4 MG/1
4 TABLET, FILM COATED ORAL EVERY 6 HOURS PRN
Qty: 20 TABLET | Refills: 0 | Status: SHIPPED | OUTPATIENT
Start: 2022-10-10 | End: 2022-10-13

## 2022-10-10 RX ORDER — ALLOPURINOL 100 MG/1
200 TABLET ORAL DAILY
COMMUNITY
Start: 2022-08-29

## 2022-10-10 ASSESSMENT — ENCOUNTER SYMPTOMS
ABDOMINAL PAIN: 1
VOMITING: 0
TROUBLE SWALLOWING: 0
CONSTIPATION: 0
DIARRHEA: 1
COUGH: 0
NAUSEA: 0
SHORTNESS OF BREATH: 0
ABDOMINAL DISTENTION: 0
ANAL BLEEDING: 0
RECTAL PAIN: 0
CHOKING: 0
BLOOD IN STOOL: 0

## 2022-10-12 ENCOUNTER — TELEPHONE (OUTPATIENT)
Dept: GASTROENTEROLOGY | Age: 77
End: 2022-10-12

## 2022-10-12 DIAGNOSIS — K57.92 DIVERTICULITIS: Primary | ICD-10-CM

## 2022-10-12 DIAGNOSIS — K57.92 DIVERTICULITIS: ICD-10-CM

## 2022-10-12 LAB
ANION GAP SERPL CALCULATED.3IONS-SCNC: 14 MMOL/L (ref 7–19)
BUN BLDV-MCNC: 19 MG/DL (ref 8–23)
CALCIUM SERPL-MCNC: 9.1 MG/DL (ref 8.8–10.2)
CHLORIDE BLD-SCNC: 97 MMOL/L (ref 98–111)
CO2: 24 MMOL/L (ref 22–29)
CREAT SERPL-MCNC: 1.3 MG/DL (ref 0.5–0.9)
GFR AFRICAN AMERICAN: 48
GFR NON-AFRICAN AMERICAN: 40
GLUCOSE BLD-MCNC: 147 MG/DL (ref 74–109)
HCT VFR BLD CALC: 38.9 % (ref 37–47)
HEMOGLOBIN: 12.6 G/DL (ref 12–16)
MCH RBC QN AUTO: 29.4 PG (ref 27–31)
MCHC RBC AUTO-ENTMCNC: 32.4 G/DL (ref 33–37)
MCV RBC AUTO: 90.9 FL (ref 81–99)
PDW BLD-RTO: 13.9 % (ref 11.5–14.5)
PLATELET # BLD: 347 K/UL (ref 130–400)
PMV BLD AUTO: 10.8 FL (ref 9.4–12.3)
POTASSIUM SERPL-SCNC: 3.8 MMOL/L (ref 3.5–5)
RBC # BLD: 4.28 M/UL (ref 4.2–5.4)
SODIUM BLD-SCNC: 135 MMOL/L (ref 136–145)
WBC # BLD: 8.4 K/UL (ref 4.8–10.8)

## 2022-10-12 NOTE — TELEPHONE ENCOUNTER
Lets check some labs.   I would like to check her kidney function prior to starting another antibiotic due to issue with Cipro in the past.

## 2022-10-12 NOTE — TELEPHONE ENCOUNTER
Assessment:      1. Diverticulitis    2. Chronic GERD    3. 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 Avoid roughage for 2 weeks, then begin high fiber diet. Last visir JG aprn 10-10-22 for diverticulitis. No FU scheduled. Patient called today from [phone] said she started the Flagyl 500 mg Tid and it is making her so nauseated and now she has very bad diarrhea and said she does not think she will be able to complete the full dosing, she is asking if there is another antibiotic she can get. Patient asking for a return call. I will send to Koko Ramos and LINDY ojeda to review and respond.  Nikita ojeda

## 2022-10-13 ENCOUNTER — APPOINTMENT (OUTPATIENT)
Dept: CT IMAGING | Age: 77
DRG: 516 | End: 2022-10-13
Payer: MEDICARE

## 2022-10-13 ENCOUNTER — HOSPITAL ENCOUNTER (INPATIENT)
Age: 77
LOS: 6 days | Discharge: HOME HEALTH | DRG: 516 | End: 2022-10-19
Attending: STUDENT IN AN ORGANIZED HEALTH CARE EDUCATION/TRAINING PROGRAM | Admitting: STUDENT IN AN ORGANIZED HEALTH CARE EDUCATION/TRAINING PROGRAM
Payer: MEDICARE

## 2022-10-13 ENCOUNTER — APPOINTMENT (OUTPATIENT)
Dept: GENERAL RADIOLOGY | Age: 77
DRG: 516 | End: 2022-10-13
Payer: MEDICARE

## 2022-10-13 DIAGNOSIS — K57.32 DIVERTICULITIS OF COLON: Primary | ICD-10-CM

## 2022-10-13 DIAGNOSIS — S32.010A CLOSED COMPRESSION FRACTURE OF BODY OF L1 VERTEBRA (HCC): ICD-10-CM

## 2022-10-13 DIAGNOSIS — S32.010A COMPRESSION FRACTURE OF L1 VERTEBRA, INITIAL ENCOUNTER (HCC): ICD-10-CM

## 2022-10-13 DIAGNOSIS — S32.019A CLOSED FRACTURE OF FIRST LUMBAR VERTEBRA, UNSPECIFIED FRACTURE MORPHOLOGY, INITIAL ENCOUNTER (HCC): ICD-10-CM

## 2022-10-13 LAB
ALBUMIN SERPL-MCNC: 4.1 G/DL (ref 3.5–5.2)
ALP BLD-CCNC: 72 U/L (ref 35–104)
ALT SERPL-CCNC: 13 U/L (ref 5–33)
ANION GAP SERPL CALCULATED.3IONS-SCNC: 15 MMOL/L (ref 7–19)
AST SERPL-CCNC: 24 U/L (ref 5–32)
BACTERIA: NEGATIVE /HPF
BASOPHILS ABSOLUTE: 0 K/UL (ref 0–0.2)
BASOPHILS RELATIVE PERCENT: 0.2 % (ref 0–1)
BILIRUB SERPL-MCNC: 0.3 MG/DL (ref 0.2–1.2)
BILIRUBIN URINE: NEGATIVE
BLOOD, URINE: NEGATIVE
BUN BLDV-MCNC: 21 MG/DL (ref 8–23)
CALCIUM SERPL-MCNC: 8.9 MG/DL (ref 8.8–10.2)
CHLORIDE BLD-SCNC: 99 MMOL/L (ref 98–111)
CLARITY: CLEAR
CO2: 21 MMOL/L (ref 22–29)
COLOR: ABNORMAL
CREAT SERPL-MCNC: 1.2 MG/DL (ref 0.5–0.9)
CRYSTALS, UA: ABNORMAL /HPF
EKG P AXIS: 83 DEGREES
EKG P-R INTERVAL: 192 MS
EKG Q-T INTERVAL: 416 MS
EKG QRS DURATION: 90 MS
EKG QTC CALCULATION (BAZETT): 413 MS
EKG T AXIS: 69 DEGREES
EOSINOPHILS ABSOLUTE: 0 K/UL (ref 0–0.6)
EOSINOPHILS RELATIVE PERCENT: 0.2 % (ref 0–5)
EPITHELIAL CELLS, UA: 7 /HPF (ref 0–5)
GFR AFRICAN AMERICAN: 53
GFR NON-AFRICAN AMERICAN: 43
GLUCOSE BLD-MCNC: 141 MG/DL (ref 74–109)
GLUCOSE URINE: NEGATIVE MG/DL
HCT VFR BLD CALC: 38.6 % (ref 37–47)
HEMOGLOBIN: 12.6 G/DL (ref 12–16)
HYALINE CASTS: 5 /HPF (ref 0–8)
IMMATURE GRANULOCYTES #: 0 K/UL
KETONES, URINE: ABNORMAL MG/DL
LEUKOCYTE ESTERASE, URINE: ABNORMAL
LIPASE: 96 U/L (ref 13–60)
LYMPHOCYTES ABSOLUTE: 1 K/UL (ref 1.1–4.5)
LYMPHOCYTES RELATIVE PERCENT: 11.4 % (ref 20–40)
MCH RBC QN AUTO: 28.8 PG (ref 27–31)
MCHC RBC AUTO-ENTMCNC: 32.6 G/DL (ref 33–37)
MCV RBC AUTO: 88.3 FL (ref 81–99)
MONOCYTES ABSOLUTE: 0.4 K/UL (ref 0–0.9)
MONOCYTES RELATIVE PERCENT: 4.6 % (ref 0–10)
NEUTROPHILS ABSOLUTE: 7.2 K/UL (ref 1.5–7.5)
NEUTROPHILS RELATIVE PERCENT: 83.3 % (ref 50–65)
NITRITE, URINE: NEGATIVE
PDW BLD-RTO: 13.8 % (ref 11.5–14.5)
PH UA: 5 (ref 5–8)
PLATELET # BLD: 305 K/UL (ref 130–400)
PMV BLD AUTO: 10.3 FL (ref 9.4–12.3)
POTASSIUM SERPL-SCNC: 3.8 MMOL/L (ref 3.5–5)
PROTEIN UA: 30 MG/DL
RBC # BLD: 4.37 M/UL (ref 4.2–5.4)
RBC UA: 1 /HPF (ref 0–4)
SARS-COV-2, NAAT: NOT DETECTED
SODIUM BLD-SCNC: 135 MMOL/L (ref 136–145)
SPECIFIC GRAVITY UA: 1.02 (ref 1–1.03)
TOTAL CK: 130 U/L (ref 26–192)
TOTAL PROTEIN: 7.2 G/DL (ref 6.6–8.7)
TROPONIN: <0.01 NG/ML (ref 0–0.03)
UROBILINOGEN, URINE: 0.2 E.U./DL
WBC # BLD: 8.7 K/UL (ref 4.8–10.8)
WBC UA: 5 /HPF (ref 0–5)

## 2022-10-13 PROCEDURE — 96365 THER/PROPH/DIAG IV INF INIT: CPT

## 2022-10-13 PROCEDURE — 99285 EMERGENCY DEPT VISIT HI MDM: CPT

## 2022-10-13 PROCEDURE — 6370000000 HC RX 637 (ALT 250 FOR IP): Performed by: PHYSICIAN ASSISTANT

## 2022-10-13 PROCEDURE — 6370000000 HC RX 637 (ALT 250 FOR IP): Performed by: NURSE PRACTITIONER

## 2022-10-13 PROCEDURE — 74176 CT ABD & PELVIS W/O CONTRAST: CPT | Performed by: RADIOLOGY

## 2022-10-13 PROCEDURE — 83690 ASSAY OF LIPASE: CPT

## 2022-10-13 PROCEDURE — 81001 URINALYSIS AUTO W/SCOPE: CPT

## 2022-10-13 PROCEDURE — 2580000003 HC RX 258: Performed by: PHYSICIAN ASSISTANT

## 2022-10-13 PROCEDURE — 6360000002 HC RX W HCPCS: Performed by: NURSE PRACTITIONER

## 2022-10-13 PROCEDURE — 85025 COMPLETE CBC W/AUTO DIFF WBC: CPT

## 2022-10-13 PROCEDURE — 71045 X-RAY EXAM CHEST 1 VIEW: CPT

## 2022-10-13 PROCEDURE — 6360000002 HC RX W HCPCS: Performed by: PHYSICIAN ASSISTANT

## 2022-10-13 PROCEDURE — 74176 CT ABD & PELVIS W/O CONTRAST: CPT

## 2022-10-13 PROCEDURE — 36415 COLL VENOUS BLD VENIPUNCTURE: CPT

## 2022-10-13 PROCEDURE — 71045 X-RAY EXAM CHEST 1 VIEW: CPT | Performed by: RADIOLOGY

## 2022-10-13 PROCEDURE — 2580000003 HC RX 258: Performed by: STUDENT IN AN ORGANIZED HEALTH CARE EDUCATION/TRAINING PROGRAM

## 2022-10-13 PROCEDURE — 6360000002 HC RX W HCPCS: Performed by: STUDENT IN AN ORGANIZED HEALTH CARE EDUCATION/TRAINING PROGRAM

## 2022-10-13 PROCEDURE — 87635 SARS-COV-2 COVID-19 AMP PRB: CPT

## 2022-10-13 PROCEDURE — G0378 HOSPITAL OBSERVATION PER HR: HCPCS

## 2022-10-13 PROCEDURE — 82550 ASSAY OF CK (CPK): CPT

## 2022-10-13 PROCEDURE — 84484 ASSAY OF TROPONIN QUANT: CPT

## 2022-10-13 PROCEDURE — 96375 TX/PRO/DX INJ NEW DRUG ADDON: CPT

## 2022-10-13 PROCEDURE — 93005 ELECTROCARDIOGRAM TRACING: CPT | Performed by: PHYSICIAN ASSISTANT

## 2022-10-13 PROCEDURE — 80053 COMPREHEN METABOLIC PANEL: CPT

## 2022-10-13 RX ORDER — PANTOPRAZOLE SODIUM 40 MG/1
40 TABLET, DELAYED RELEASE ORAL
Status: DISCONTINUED | OUTPATIENT
Start: 2022-10-14 | End: 2022-10-19 | Stop reason: HOSPADM

## 2022-10-13 RX ORDER — SODIUM CHLORIDE 0.9 % (FLUSH) 0.9 %
5-40 SYRINGE (ML) INJECTION EVERY 12 HOURS SCHEDULED
Status: DISCONTINUED | OUTPATIENT
Start: 2022-10-13 | End: 2022-10-13 | Stop reason: SDUPTHER

## 2022-10-13 RX ORDER — ATORVASTATIN CALCIUM 20 MG/1
20 TABLET, FILM COATED ORAL DAILY
Status: DISCONTINUED | OUTPATIENT
Start: 2022-10-13 | End: 2022-10-19 | Stop reason: HOSPADM

## 2022-10-13 RX ORDER — METHOCARBAMOL 500 MG/1
500 TABLET, FILM COATED ORAL ONCE
Status: COMPLETED | OUTPATIENT
Start: 2022-10-13 | End: 2022-10-13

## 2022-10-13 RX ORDER — SODIUM CHLORIDE, SODIUM LACTATE, POTASSIUM CHLORIDE, CALCIUM CHLORIDE 600; 310; 30; 20 MG/100ML; MG/100ML; MG/100ML; MG/100ML
INJECTION, SOLUTION INTRAVENOUS CONTINUOUS
Status: DISCONTINUED | OUTPATIENT
Start: 2022-10-13 | End: 2022-10-17

## 2022-10-13 RX ORDER — ACETAMINOPHEN 325 MG/1
650 TABLET ORAL EVERY 4 HOURS PRN
Status: DISCONTINUED | OUTPATIENT
Start: 2022-10-13 | End: 2022-10-13 | Stop reason: SDUPTHER

## 2022-10-13 RX ORDER — ONDANSETRON 2 MG/ML
4 INJECTION INTRAMUSCULAR; INTRAVENOUS EVERY 6 HOURS PRN
Status: DISCONTINUED | OUTPATIENT
Start: 2022-10-13 | End: 2022-10-19 | Stop reason: HOSPADM

## 2022-10-13 RX ORDER — LEVOTHYROXINE SODIUM 0.07 MG/1
75 TABLET ORAL DAILY
Status: DISCONTINUED | OUTPATIENT
Start: 2022-10-14 | End: 2022-10-19 | Stop reason: HOSPADM

## 2022-10-13 RX ORDER — ALLOPURINOL 100 MG/1
200 TABLET ORAL DAILY
Status: DISCONTINUED | OUTPATIENT
Start: 2022-10-13 | End: 2022-10-19 | Stop reason: HOSPADM

## 2022-10-13 RX ORDER — ONDANSETRON 4 MG/1
4 TABLET, ORALLY DISINTEGRATING ORAL EVERY 8 HOURS PRN
Status: DISCONTINUED | OUTPATIENT
Start: 2022-10-13 | End: 2022-10-19 | Stop reason: HOSPADM

## 2022-10-13 RX ORDER — SODIUM CHLORIDE 9 MG/ML
INJECTION, SOLUTION INTRAVENOUS PRN
Status: DISCONTINUED | OUTPATIENT
Start: 2022-10-13 | End: 2022-10-13 | Stop reason: SDUPTHER

## 2022-10-13 RX ORDER — SODIUM CHLORIDE 0.9 % (FLUSH) 0.9 %
5-40 SYRINGE (ML) INJECTION PRN
Status: DISCONTINUED | OUTPATIENT
Start: 2022-10-13 | End: 2022-10-13 | Stop reason: SDUPTHER

## 2022-10-13 RX ORDER — ENOXAPARIN SODIUM 100 MG/ML
40 INJECTION SUBCUTANEOUS EVERY 24 HOURS
Status: DISCONTINUED | OUTPATIENT
Start: 2022-10-13 | End: 2022-10-16

## 2022-10-13 RX ORDER — SODIUM CHLORIDE 0.9 % (FLUSH) 0.9 %
5-40 SYRINGE (ML) INJECTION EVERY 12 HOURS SCHEDULED
Status: DISCONTINUED | OUTPATIENT
Start: 2022-10-13 | End: 2022-10-19 | Stop reason: HOSPADM

## 2022-10-13 RX ORDER — POLYETHYLENE GLYCOL 3350 17 G/17G
17 POWDER, FOR SOLUTION ORAL DAILY PRN
Status: DISCONTINUED | OUTPATIENT
Start: 2022-10-13 | End: 2022-10-19 | Stop reason: HOSPADM

## 2022-10-13 RX ORDER — ACETAMINOPHEN 325 MG/1
650 TABLET ORAL EVERY 6 HOURS PRN
Status: DISCONTINUED | OUTPATIENT
Start: 2022-10-13 | End: 2022-10-19 | Stop reason: HOSPADM

## 2022-10-13 RX ORDER — ACETAMINOPHEN 650 MG/1
650 SUPPOSITORY RECTAL EVERY 6 HOURS PRN
Status: DISCONTINUED | OUTPATIENT
Start: 2022-10-13 | End: 2022-10-19 | Stop reason: HOSPADM

## 2022-10-13 RX ORDER — LIDOCAINE 4 G/G
1 PATCH TOPICAL DAILY
Status: DISCONTINUED | OUTPATIENT
Start: 2022-10-13 | End: 2022-10-19 | Stop reason: HOSPADM

## 2022-10-13 RX ORDER — HYDROCODONE BITARTRATE AND ACETAMINOPHEN 5; 325 MG/1; MG/1
1 TABLET ORAL EVERY 4 HOURS PRN
Status: DISCONTINUED | OUTPATIENT
Start: 2022-10-13 | End: 2022-10-17

## 2022-10-13 RX ORDER — SODIUM CHLORIDE 0.9 % (FLUSH) 0.9 %
5-40 SYRINGE (ML) INJECTION PRN
Status: DISCONTINUED | OUTPATIENT
Start: 2022-10-13 | End: 2022-10-19 | Stop reason: HOSPADM

## 2022-10-13 RX ORDER — HYDROMORPHONE HYDROCHLORIDE 1 MG/ML
0.5 INJECTION, SOLUTION INTRAMUSCULAR; INTRAVENOUS; SUBCUTANEOUS
Status: DISCONTINUED | OUTPATIENT
Start: 2022-10-13 | End: 2022-10-17

## 2022-10-13 RX ORDER — SODIUM CHLORIDE 9 MG/ML
INJECTION, SOLUTION INTRAVENOUS PRN
Status: DISCONTINUED | OUTPATIENT
Start: 2022-10-13 | End: 2022-10-19 | Stop reason: HOSPADM

## 2022-10-13 RX ORDER — MORPHINE SULFATE 4 MG/ML
4 INJECTION, SOLUTION INTRAMUSCULAR; INTRAVENOUS ONCE
Status: COMPLETED | OUTPATIENT
Start: 2022-10-13 | End: 2022-10-13

## 2022-10-13 RX ADMIN — HYDROCODONE BITARTRATE AND ACETAMINOPHEN 1 TABLET: 5; 325 TABLET ORAL at 20:24

## 2022-10-13 RX ADMIN — HYDROMORPHONE HYDROCHLORIDE 0.5 MG: 1 INJECTION, SOLUTION INTRAMUSCULAR; INTRAVENOUS; SUBCUTANEOUS at 15:43

## 2022-10-13 RX ADMIN — PIPERACILLIN AND TAZOBACTAM 3375 MG: 3; .375 INJECTION, POWDER, LYOPHILIZED, FOR SOLUTION INTRAVENOUS at 20:27

## 2022-10-13 RX ADMIN — PIPERACILLIN AND TAZOBACTAM 3375 MG: 3; .375 INJECTION, POWDER, FOR SOLUTION INTRAVENOUS at 13:21

## 2022-10-13 RX ADMIN — MORPHINE SULFATE 4 MG: 4 INJECTION, SOLUTION INTRAMUSCULAR; INTRAVENOUS at 10:07

## 2022-10-13 RX ADMIN — METHOCARBAMOL 500 MG: 500 TABLET ORAL at 09:45

## 2022-10-13 RX ADMIN — SODIUM CHLORIDE, PRESERVATIVE FREE 10 ML: 5 INJECTION INTRAVENOUS at 23:21

## 2022-10-13 RX ADMIN — ENOXAPARIN SODIUM 40 MG: 100 INJECTION SUBCUTANEOUS at 15:48

## 2022-10-13 RX ADMIN — SODIUM CHLORIDE, SODIUM LACTATE, POTASSIUM CHLORIDE, AND CALCIUM CHLORIDE: 600; 310; 30; 20 INJECTION, SOLUTION INTRAVENOUS at 15:31

## 2022-10-13 ASSESSMENT — ENCOUNTER SYMPTOMS
ABDOMINAL PAIN: 1
BLOOD IN STOOL: 0
VOMITING: 0
CONSTIPATION: 0
ABDOMINAL DISTENTION: 1
NAUSEA: 1
DIARRHEA: 1
COUGH: 0
BACK PAIN: 1
SHORTNESS OF BREATH: 0

## 2022-10-13 ASSESSMENT — PAIN - FUNCTIONAL ASSESSMENT: PAIN_FUNCTIONAL_ASSESSMENT: 0-10

## 2022-10-13 ASSESSMENT — PAIN SCALES - GENERAL
PAINLEVEL_OUTOF10: 9
PAINLEVEL_OUTOF10: 10
PAINLEVEL_OUTOF10: 8
PAINLEVEL_OUTOF10: 10
PAINLEVEL_OUTOF10: 10

## 2022-10-13 ASSESSMENT — PAIN DESCRIPTION - LOCATION
LOCATION: BACK

## 2022-10-13 ASSESSMENT — PAIN DESCRIPTION - DESCRIPTORS
DESCRIPTORS: ACHING
DESCRIPTORS: ACHING

## 2022-10-13 ASSESSMENT — PAIN DESCRIPTION - ORIENTATION
ORIENTATION: LOWER
ORIENTATION: RIGHT;LEFT;MID

## 2022-10-13 NOTE — CARE COORDINATION
Patient Contact Information:    [address] [place] [phone] (home)   Telephone Information:   Mobile [phone]     Above information verified? [x]   Yes  []   No      Emergency Contacts:    Extended Emergency Contact Information  Primary Emergency Contact: Pradeep Caballero  Address: [address] [place] [address] Phone: [phone]  Mobile Phone: [phone]  Relation: Spouse  Low vision? Yes  Hearing or visual needs: Glasses  Other needs: None  Preferred language: English   needed? No      Have you been vaccinated for COVID-19 (SARS-CoV-2)? [x]   Yes  []   No                   If so, when?     Which :         [x]   [employer]  []   Moderna  []   Terrie Rue  []   Other:         Pharmacy:    [facility] [address] [place] [address] [phone] Lior Jessenia [phone]  [address] [facility] [address]  Phone: [phone] Fax: [phone]          Patient Deficits:    []   Yes   [x]   No    If yes:    []   Confusion/Memory  []   Visual  []   Motor/Sensory         []   Right arm         []   Right leg         []   Left arm         []   Left leg  []   Language/Speech         []   Aphasia         []   Dysarthria         []   Swallow         [place] Coma Scale  Eye Opening: Spontaneous  Best Verbal Response: Oriented  Best Motor Response: Obeys commands  Matt Coma Scale Score: 15    Patient Deficit Notes:          10/13/22 1139   Service Assessment   Patient Orientation Alert and Oriented   Cognition Alert   History Provided By Patient   Primary Caregiver Self   Support Systems Spouse/Significant Other;Friends/Neighbors   Patient's Healthcare Decision Maker is: Legal Next of Kin   PCP Verified by CM Yes   Last Visit to PCP Within last 3 months   Prior Functional Level Independent in ADLs/IADLs   Current Functional Level Assistance with the following:;Bathing;Dressing; Toileting;Feeding;Cooking;Housework; Shopping;Mobility   Can patient return to prior living arrangement Yes   Ability to make needs known: Good   Family able to assist with home care needs: Yes   Financial Resources [facility] Resources   (Denied Needs)   CM/SW Referral   (Denied Needs)   Social/Functional History   Lives With Spouse   Type of [address] One level   Home Access Level entry   Bathroom Shower/Tub Walk-in shower   Bathroom Toilet Standard   Bathroom Equipment   (No Bathroom DME)   [address] Help From Family;Friend(s)   ADL Assistance Independent   Homemaking Assistance Independent   Homemaking Responsibilities Yes   Ambulation Assistance Independent   Transfer Assistance Independent   Active  Yes   Mode of Transportation Car   Occupation Retired   Discharge Planning   Type of [address] Prior To Admission None   Potential Assistance Needed N/A   DME Ordered?  No   Potential Assistance Purchasing Medications No   Type of Home Care Services None   Patient expects to be discharged to: [facility] Discharge   Transition of Care Consult (CM Consult)   (Denied Needs)   Services At/After Discharge None   Mode of Transport at Discharge Self

## 2022-10-13 NOTE — ED NOTES
Pt readjusted in bed. Pt able to move and change position with little assistance.      Rohan Mejia RN  10/13/22 1010

## 2022-10-13 NOTE — PROGRESS NOTES
4 Eyes Skin Assessment    Jabari Oconnor is being assessed upon: Admission    I agree that Kristopher Allen RN, along with Maria Fernanda RN have performed a thorough Head to Toe Skin Assessment on the patient. ALL assessment sites listed below have been assessed. Areas assessed by both nurses:     [x]   Head, Face, and Ears   [x]   Shoulders, Back, and Chest  [x]   Arms, Elbows, and Hands   [x]   Coccyx, Sacrum, and Ischium  [x]   Legs, Feet, and Heels    Does the Patient have Skin Breakdown?  No    Cameron Prevention initiated: No  Wound Care Orders initiated: No    [facility] nurse consulted for Pressure Injury (Stage 3,4, Unstageable, DTI, NWPT, and Complex wounds) and New or Established Ostomies: No        Primary Nurse eSignature: Nima Oconnor RN on 10/13/2022 at 3:19 PM      Co-Signer eSignature: Electronically signed by Alan Pace RN on 10/13/22 at 4:25 PM CDT

## 2022-10-13 NOTE — ED NOTES
Pt oxygen saturation dropped to 85%. Pt assessed and was found to be sleeping after pain medication administration. Pt placed on 2L NC and oxygen saturation now at 95%.  Shahana MAJANO made aware     Cornell Sandifer, RN  10/13/22 4767

## 2022-10-13 NOTE — ED NOTES
Pt requesting pain medication. Shahana MAJANO at bedside and informs pt of plan due to pt morphine allergy. Pt states that she doesn't think that she is truly allergic to morphine and wishes to try the medication. Shahana MAJANO verbalized risk and pt still expressed desire to attempt. Verbal order for 4mg morphine given by Shahana MAJANO.      Cinda Campuzano RN  10/13/22 6081

## 2022-10-13 NOTE — ED PROVIDER NOTES
[facility] EMERGENCY DEPT  eMERGENCY dEPARTMENT eNCOUnter      Pt Name: Jabari Oconnor  MRN: [id number]  [place] 1945  Date of evaluation: 10/13/2022  Provider: Ronald Currie       Chief Complaint   Patient presents with    Back Pain     Pt c/o low back pain after lying in the floor all night. Dizziness     Pt states she got up in the middle of the night to go to the restroom. States she got very dizzy and laid in the floor to prevent passing out or falling. Denies LOC. HISTORY OF PRESENT ILLNESS   (Location/Symptom, Timing/Onset,Context/Setting, Quality, Duration, Modifying Factors, Severity)  Note limiting factors. Jabari Oconnor is a 65 y.o. female with history including hypertension, IBS, diverticulosis, arthritis, hypothyroidism, hyperlipidemia, and GERD who presents to the emergency department with complaint of back pain. The patient states that she went to the doctor on Monday and was started on antibiotic for suspected diverticulitis. She states she was having diffuse abdominal pain at that time. She states after being on the antibiotic for about 24 to 48 hours, she started noticing significant diarrhea. She denies any blood in the stool. She notes nausea without vomiting. She states that last night on her way to the bathroom she was feeling dizzy. She states she sat down during an episode of near syncope. She states she remained on the floor for the rest of the night. She denies that she fell or sat hard onto the ground. She denies any associated loss of consciousness. She states that this morning when her  helped her get up, she had severe back pain. She denies any bowel or bladder dysfunction, saddle anesthesia, or radiculopathy symptoms. She feels her diverticulitis symptoms are also worsening. NursingNotes were reviewed.     REVIEW OF SYSTEMS    (2-9 systems for level 4, 10 or more for level 5)     Review of Systems   Constitutional:  Negative for chills and fever. HENT:  Negative for congestion. Respiratory:  Negative for cough and shortness of breath. Cardiovascular:  Negative for chest pain. Gastrointestinal:  Positive for abdominal distention, abdominal pain, diarrhea and nausea. Negative for blood in stool, constipation and vomiting. Genitourinary:  Negative for dysuria, flank pain, frequency, hematuria, vaginal bleeding and vaginal discharge. Musculoskeletal:  Positive for back pain. Negative for gait problem. Neurological:  Negative for dizziness and headaches. All other systems reviewed and are negative.          PAST MEDICALHISTORY     Past Medical History:   Diagnosis Date    Acute renal failure ([facility] [address] 07/29/2016    discharged 8/1/16; \"due to strong antibiotic and being dehydrated\"    Arthritis     Breast cancer ([facility] [address]     surgery only    Cancer ([facility] [address]     Breast cancer    Colon polyps     Diverticulitis     GERD (gastroesophageal reflux disease)     Hyperlipidemia     Hypertension     Joint pain     Thyroid condition          SURGICAL HISTORY       Past Surgical History:   Procedure Laterality Date    ABDOMEN SURGERY      BLADDER REPAIR      BLEPHAROPLASTY      BREAST ENHANCEMENT SURGERY      BUNIONECTOMY      CATARACT REMOVAL Bilateral     COLONOSCOPY  03/10/2009    Dr Sher Reynoso, [place], lipoma in the proximal ascending colon, 5 yr recall    COLONOSCOPY  04/29/2014    Dr Michelle Riggins x 2, [place], 5 yr recall    COLONOSCOPY N/A 05/21/2019    Dr Linda Church-Questionable lipoma, diverticular disease-Tubular AP (-) dysplasia, 5 yr recall    HYSTERECTOMY (CERVIX STATUS UNKNOWN)      KNEE SURGERY Left 08/17/2016    MASTECTOMY Bilateral 1984    OVARY REMOVAL      PARTIAL HYSTERECTOMY (CERVIX NOT REMOVED)      PATELLA SURGERY      THYROIDECTOMY, PARTIAL      TONSILLECTOMY AND ADENOIDECTOMY      TOTAL KNEE ARTHROPLASTY Left 08/17/2016    KNEE TOTAL ARTHROPLASTY performed by Adia Dave MD at [facility] ENDOSCOPY N/A 08/27/2020    Dr Sincere Church-w/[id number]-[id number]-Fmnqugbiz obstructing ring in GEJ, severe esophagitis, gastritis, (+) Marie's, indeterminate for dysplasia--1st dx, Repeat in 3 months    UPPER GASTROINTESTINAL ENDOSCOPY N/A 11/12/2020    Dr Sincere Church-w/kse-13X-Udbtwyufbomq ring at the GEJ-Well healed esophagitis w/Schatzkis ring (+) Marie's, 1 yr recall    UPPER GASTROINTESTINAL ENDOSCOPY N/A 11/29/2021    Dr Sincere Church-Gastritis, no Marie's, no h pylori, 3 yr recall         CURRENT MEDICATIONS     Previous Medications    ALLOPURINOL (ZYLOPRIM) 100 MG TABLET    Take 200 mg by mouth daily    LEVOTHYROXINE (SYNTHROID) 50 MCG TABLET    Take of the 100 mcg Synthroid - use two 50s    METRONIDAZOLE (FLAGYL) 500 MG TABLET    Take 1 tablet by mouth 3 times daily for 10 days    OLMESARTAN-HYDROCHLOROTHIAZIDE (BENICAR HCT) 40-12.5 MG PER TABLET    Take 1 tablet by mouth daily    OMEPRAZOLE (PRILOSEC) 40 MG DELAYED RELEASE CAPSULE    Take 1 capsule by mouth every morning (before breakfast)    ONDANSETRON (ZOFRAN) 4 MG TABLET    Take 1 tablet by mouth every 6 hours as needed for Nausea    SIMVASTATIN (ZOCOR) 20 MG TABLET    Take 20 mg by mouth nightly        ALLERGIES     Ciprofloxacin and Morphine    FAMILY HISTORY       Family History   Problem Relation Age of Onset    Prostate Cancer Father     Breast Cancer Sister     Lung Cancer Brother     Colon Cancer Paternal Aunt     Colon Polyps Paternal Aunt     Stomach Cancer Other     Esophageal Cancer Neg Hx     Liver Cancer Neg Hx     Liver Disease Neg Hx     Rectal Cancer Neg Hx           SOCIAL HISTORY       Social History     Socioeconomic History    Marital status:      Spouse name: None    Number of children: None    Years of education: None    Highest education level: None   Tobacco Use    Smoking status: Never    Smokeless tobacco: Never   Vaping Use    Vaping Use: Never used   Substance and Sexual Activity    Alcohol use: No    Drug use: No       SCREENINGS Matt Coma Scale  Eye Opening: Spontaneous  Best Verbal Response: Oriented  Best Motor Response: Obeys commands  Matt Coma Scale Score: 15        PHYSICAL EXAM    (up to 7 for level 4, 8 or more for level 5)     ED Triage Vitals [10/13/22 0851]   BP Temp Temp Source Heart Rate Resp SpO2 Height Weight   138/70 98.2 °F (36.8 °C) Oral 56 18 92 % 5' 4\" (1.626 m) 185 lb (83.9 kg)       Physical Exam  Vitals reviewed. Constitutional:       General: She is not in acute distress. Appearance: Normal appearance. She is obese. She is ill-appearing. She is not toxic-appearing or diaphoretic. Comments: Appears uncomfortable   HENT:      Head: Normocephalic and atraumatic. Cardiovascular:      Rate and Rhythm: Normal rate and regular rhythm. Pulses: Normal pulses. Pulmonary:      Effort: Pulmonary effort is normal. No respiratory distress. Chest:      Chest wall: No tenderness. Abdominal:      General: There is no distension. Palpations: Abdomen is soft. Tenderness: There is abdominal tenderness (LLQ). There is no right CVA tenderness or left CVA tenderness. Musculoskeletal:      Cervical back: Normal range of motion and neck supple. Thoracic back: No swelling, deformity, tenderness or bony tenderness. Normal range of motion. Lumbar back: Tenderness and bony tenderness present. No swelling, deformity or signs of trauma. Normal range of motion. Comments: Midline vertebral tenderness in lumbar spine. No associated tenderness in thoracic spine. Skin:     General: Skin is warm and dry. Neurological:      General: No focal deficit present. Mental Status: She is alert and oriented to person, place, and time. Sensory: No sensory deficit. Motor: No weakness.        DIAGNOSTIC RESULTS     EKG: All EKG's areinterpreted by the Emergency Department Physician who either signs or Co-signs this chart in the absence of a cardiologist.    EKG interpreted by attending, sinus rhythm at a rate of 58, no STEMI or acute ischemia, , Qtc 421, nonspecific abnormal T waves    RADIOLOGY:  Non-plain film images such as CT, Ultrasound and MRI are read by the radiologist. Plain radiographic images are visualized and preliminarily interpreted bythe emergency physician with the below findings:    CT ABDOMEN PELVIS WO CONTRAST Additional Contrast? None   Final Result   1. Colonic diverticular formations with mild pericolonic fat stranding at the sigmoid level suggestive of diverticulitis. 2. Mild diffuse wall thickening of the distal duodenum and proximal jejunum with minimal fat stranding consider infectious/inflammatory process. 3. Bilobed mid body left renal (pelvic and cortical) cystic structure 4.3 x 7.3 x 4.6 cm, incompletely characterized. Could correlate with dedicated renal ultrasound and/or CT   4. Compression fracture deformity of the L1 vertebral body (approximately 40% of reduced height) of indeterminate age. Recommendation: Follow up as clinically indicated. All CT scans at this facility utilize dose modulation, iterative reconstruction, and/or weight based dosing when appropriate to reduce radiation dose to as low as reasonably achievable. Amended by Meagan Ruiz MD at 13-Oct-2022 12:14:04 PM   Electronically Signed by Meagan Ruiz MD at 13-Oct-2022 12:04:12 PM               XR CHEST PORTABLE   Final Result   No focal pneumonic consolidation, no pleural effusion or tayler CHF. Recommendation: Follow up as clinically indicated.     Electronically Signed by Meagan Ruiz MD at 13-Oct-2022 10:40:39 AM                       LABS:  Labs Reviewed   COMPREHENSIVE METABOLIC PANEL - Abnormal; Notable for the following components:       Result Value    Sodium 135 (*)     CO2 21 (*)     Glucose 141 (*)     Creatinine 1.2 (*)     GFR Non- 43 (*)     GFR  53 (*)     All other components within normal limits   CBC WITH AUTO DIFFERENTIAL - Abnormal; Notable for the following components:    MCHC 32.6 (*)     Neutrophils % 83.3 (*)     Lymphocytes % 11.4 (*)     Lymphocytes Absolute 1.0 (*)     All other components within normal limits   LIPASE - Abnormal; Notable for the following components:    Lipase 96 (*)     All other components within normal limits   URINALYSIS WITH REFLEX TO CULTURE - Abnormal; Notable for the following components:    Color, UA DARK YELLOW (*)     Ketones, Urine TRACE (*)     Protein, UA 30 (*)     Leukocyte Esterase, Urine SMALL (*)     All other components within normal limits   MICROSCOPIC URINALYSIS - Abnormal; Notable for the following components:    Bacteria, UA NEGATIVE (*)     Crystals, UA NEG (*)     All other components within normal limits   COVID-19, RAPID   GASTROINTESTINAL PANEL, MOLECULAR   TROPONIN   CK       All other labs were within normal range or not returned as of this dictation. EMERGENCY DEPARTMENT COURSE and DIFFERENTIAL DIAGNOSIS/MDM:   Vitals:    Vitals:    10/13/22 0946 10/13/22 1125 10/13/22 1126 10/13/22 1146   BP: 132/68   (!) 113/58   Pulse: 62   58   Resp: 17   17   Temp:       TempSrc:       SpO2: 92% (!) 85% 95% 95%   Weight:       Height:           MDM     Amount and/or Complexity of Data Reviewed  Decide to obtain previous medical records or to obtain history from someone other than the patient: yes    Patient is a 68-year-old female presents the ER with complaint of back pain as well as left lower quadrant abdominal pain. On physical exam she does have tenderness of along the midline as well as left lower quadrant abdominal pain. Her vital signs are stable. She was given medication for pain control in the ER which did somewhat improve her pain. She had no significant leukocytosis or anemia on her CBC. Her CMP was not concerning for WILLA or significant electrolyte disturbance. Her LFTs are normal.  CK was within normal limits. Lipase is slightly elevated.   Urinalysis does not show significant infection and is a poor sample with a high number of epithelial cells. COVID-negative. Due to her pain, I did also obtain cardiac work-up. Her EKG showed sinus rhythm without STEMI or acute ischemia. She had negative troponin. As has been going on over 6 hours and she has no chest pain, further trending of troponins are not warranted at this time. Awaiting stool sample but due to recent abx use with severe diarrhea, will obtain stool culture to rule out C-diff/infectious cause. CT of the abdomen and pelvis does confirm diverticulitis of the left lower quadrant without any associated abscess formation or bowel perforation. As she has failed out treatment patient, she will come in for antibiotics. Started on a dose of Zosyn in the ER. Findings are also concerning on CT for a L1 compression fracture. She is neurovascularly intact at this time. She has no known history of a compression fracture. Spoke with Dr. Casey Bustos who accepted the patient's care for inpatient placement. Patient is also agreeable to this admission. CONSULTS:  Dr Casey Bustos, Accepting physician     FINAL IMPRESSION      1. Diverticulitis of colon    2.  Closed fracture of first lumbar vertebra, unspecified fracture morphology, initial encounter [facility])          DISPOSITION/PLAN   DISPOSITION Admitted 10/13/2022 02:26:11 PM      (Please note that portions of this note were completed with a voice recognition program.  Efforts were made to edit thedictations but occasionally words are mis-transcribed.)    GRETEL Tran (electronically signed)     Rosario Tran  10/13/22 4808

## 2022-10-14 LAB
ANION GAP SERPL CALCULATED.3IONS-SCNC: 10 MMOL/L (ref 7–19)
BASOPHILS ABSOLUTE: 0 K/UL (ref 0–0.2)
BASOPHILS RELATIVE PERCENT: 0.6 % (ref 0–1)
BUN BLDV-MCNC: 16 MG/DL (ref 8–23)
CALCIUM SERPL-MCNC: 8.8 MG/DL (ref 8.8–10.2)
CHLORIDE BLD-SCNC: 97 MMOL/L (ref 98–111)
CO2: 27 MMOL/L (ref 22–29)
CREAT SERPL-MCNC: 1.1 MG/DL (ref 0.5–0.9)
EOSINOPHILS ABSOLUTE: 0.3 K/UL (ref 0–0.6)
EOSINOPHILS RELATIVE PERCENT: 4.1 % (ref 0–5)
GFR AFRICAN AMERICAN: 58
GFR NON-AFRICAN AMERICAN: 48
GLUCOSE BLD-MCNC: 97 MG/DL (ref 74–109)
HCT VFR BLD CALC: 35.6 % (ref 37–47)
HEMOGLOBIN: 11.4 G/DL (ref 12–16)
IMMATURE GRANULOCYTES #: 0 K/UL
LYMPHOCYTES ABSOLUTE: 1.4 K/UL (ref 1.1–4.5)
LYMPHOCYTES RELATIVE PERCENT: 19.6 % (ref 20–40)
MCH RBC QN AUTO: 28.8 PG (ref 27–31)
MCHC RBC AUTO-ENTMCNC: 32 G/DL (ref 33–37)
MCV RBC AUTO: 89.9 FL (ref 81–99)
MONOCYTES ABSOLUTE: 0.6 K/UL (ref 0–0.9)
MONOCYTES RELATIVE PERCENT: 8.5 % (ref 0–10)
NEUTROPHILS ABSOLUTE: 4.7 K/UL (ref 1.5–7.5)
NEUTROPHILS RELATIVE PERCENT: 66.9 % (ref 50–65)
PDW BLD-RTO: 13.8 % (ref 11.5–14.5)
PLATELET # BLD: 280 K/UL (ref 130–400)
PMV BLD AUTO: 10 FL (ref 9.4–12.3)
POTASSIUM REFLEX MAGNESIUM: 3.8 MMOL/L (ref 3.5–5)
RBC # BLD: 3.96 M/UL (ref 4.2–5.4)
SODIUM BLD-SCNC: 134 MMOL/L (ref 136–145)
WBC # BLD: 7 K/UL (ref 4.8–10.8)

## 2022-10-14 PROCEDURE — 6360000002 HC RX W HCPCS: Performed by: STUDENT IN AN ORGANIZED HEALTH CARE EDUCATION/TRAINING PROGRAM

## 2022-10-14 PROCEDURE — 80048 BASIC METABOLIC PNL TOTAL CA: CPT

## 2022-10-14 PROCEDURE — 6370000000 HC RX 637 (ALT 250 FOR IP): Performed by: NURSE PRACTITIONER

## 2022-10-14 PROCEDURE — 6370000000 HC RX 637 (ALT 250 FOR IP): Performed by: NEUROLOGICAL SURGERY

## 2022-10-14 PROCEDURE — 97535 SELF CARE MNGMENT TRAINING: CPT

## 2022-10-14 PROCEDURE — 97162 PT EVAL MOD COMPLEX 30 MIN: CPT

## 2022-10-14 PROCEDURE — 97165 OT EVAL LOW COMPLEX 30 MIN: CPT

## 2022-10-14 PROCEDURE — 36415 COLL VENOUS BLD VENIPUNCTURE: CPT

## 2022-10-14 PROCEDURE — 85025 COMPLETE CBC W/AUTO DIFF WBC: CPT

## 2022-10-14 PROCEDURE — 6370000000 HC RX 637 (ALT 250 FOR IP): Performed by: STUDENT IN AN ORGANIZED HEALTH CARE EDUCATION/TRAINING PROGRAM

## 2022-10-14 PROCEDURE — 97530 THERAPEUTIC ACTIVITIES: CPT

## 2022-10-14 PROCEDURE — 1210000000 HC MED SURG R&B

## 2022-10-14 PROCEDURE — G0378 HOSPITAL OBSERVATION PER HR: HCPCS

## 2022-10-14 PROCEDURE — 51798 US URINE CAPACITY MEASURE: CPT

## 2022-10-14 PROCEDURE — 2580000003 HC RX 258: Performed by: STUDENT IN AN ORGANIZED HEALTH CARE EDUCATION/TRAINING PROGRAM

## 2022-10-14 PROCEDURE — 99222 1ST HOSP IP/OBS MODERATE 55: CPT | Performed by: NEUROLOGICAL SURGERY

## 2022-10-14 RX ORDER — TIZANIDINE 4 MG/1
4 TABLET ORAL EVERY 6 HOURS PRN
Status: DISCONTINUED | OUTPATIENT
Start: 2022-10-14 | End: 2022-10-19 | Stop reason: HOSPADM

## 2022-10-14 RX ADMIN — HYDROCODONE BITARTRATE AND ACETAMINOPHEN 1 TABLET: 5; 325 TABLET ORAL at 15:35

## 2022-10-14 RX ADMIN — ENOXAPARIN SODIUM 40 MG: 100 INJECTION SUBCUTANEOUS at 15:35

## 2022-10-14 RX ADMIN — Medication 1 TABLET: at 12:01

## 2022-10-14 RX ADMIN — SODIUM CHLORIDE, SODIUM LACTATE, POTASSIUM CHLORIDE, AND CALCIUM CHLORIDE: 600; 310; 30; 20 INJECTION, SOLUTION INTRAVENOUS at 04:32

## 2022-10-14 RX ADMIN — ATORVASTATIN CALCIUM 20 MG: 20 TABLET, FILM COATED ORAL at 10:15

## 2022-10-14 RX ADMIN — HYDROCODONE BITARTRATE AND ACETAMINOPHEN 1 TABLET: 5; 325 TABLET ORAL at 10:15

## 2022-10-14 RX ADMIN — TIZANIDINE 4 MG: 4 TABLET ORAL at 18:13

## 2022-10-14 RX ADMIN — LEVOTHYROXINE SODIUM 75 MCG: 75 TABLET ORAL at 05:05

## 2022-10-14 RX ADMIN — PIPERACILLIN AND TAZOBACTAM 3375 MG: 3; .375 INJECTION, POWDER, LYOPHILIZED, FOR SOLUTION INTRAVENOUS at 12:00

## 2022-10-14 RX ADMIN — HYDROCODONE BITARTRATE AND ACETAMINOPHEN 1 TABLET: 5; 325 TABLET ORAL at 21:29

## 2022-10-14 RX ADMIN — PANTOPRAZOLE SODIUM 40 MG: 40 TABLET, DELAYED RELEASE ORAL at 05:04

## 2022-10-14 RX ADMIN — PIPERACILLIN AND TAZOBACTAM 3375 MG: 3; .375 INJECTION, POWDER, LYOPHILIZED, FOR SOLUTION INTRAVENOUS at 04:32

## 2022-10-14 RX ADMIN — HYDROCODONE BITARTRATE AND ACETAMINOPHEN 1 TABLET: 5; 325 TABLET ORAL at 04:29

## 2022-10-14 RX ADMIN — ONDANSETRON 4 MG: 4 TABLET, ORALLY DISINTEGRATING ORAL at 13:15

## 2022-10-14 RX ADMIN — HYDROCODONE BITARTRATE AND ACETAMINOPHEN 1 TABLET: 5; 325 TABLET ORAL at 00:15

## 2022-10-14 RX ADMIN — TIZANIDINE 4 MG: 4 TABLET ORAL at 12:00

## 2022-10-14 RX ADMIN — ALLOPURINOL 200 MG: 100 TABLET ORAL at 10:15

## 2022-10-14 RX ADMIN — SODIUM CHLORIDE, PRESERVATIVE FREE 5 ML: 5 INJECTION INTRAVENOUS at 10:15

## 2022-10-14 RX ADMIN — PIPERACILLIN AND TAZOBACTAM 3375 MG: 3; .375 INJECTION, POWDER, LYOPHILIZED, FOR SOLUTION INTRAVENOUS at 18:33

## 2022-10-14 ASSESSMENT — PAIN DESCRIPTION - LOCATION
LOCATION: BACK

## 2022-10-14 ASSESSMENT — PAIN SCALES - GENERAL
PAINLEVEL_OUTOF10: 7
PAINLEVEL_OUTOF10: 10
PAINLEVEL_OUTOF10: 9
PAINLEVEL_OUTOF10: 9
PAINLEVEL_OUTOF10: 6
PAINLEVEL_OUTOF10: 7
PAINLEVEL_OUTOF10: 4

## 2022-10-14 ASSESSMENT — PAIN DESCRIPTION - ORIENTATION
ORIENTATION: RIGHT;LEFT;LOWER
ORIENTATION: LOWER
ORIENTATION: LOWER
ORIENTATION: RIGHT;LEFT;LOWER
ORIENTATION: LOWER
ORIENTATION: LOWER

## 2022-10-14 ASSESSMENT — ENCOUNTER SYMPTOMS
DIARRHEA: 1
RESPIRATORY NEGATIVE: 1
EYES NEGATIVE: 1
BACK PAIN: 1
ABDOMINAL PAIN: 1
ALLERGIC/IMMUNOLOGIC NEGATIVE: 1

## 2022-10-14 ASSESSMENT — PAIN DESCRIPTION - DESCRIPTORS
DESCRIPTORS: ACHING
DESCRIPTORS: SPASM
DESCRIPTORS: ACHING
DESCRIPTORS: ACHING;SORE;THROBBING
DESCRIPTORS: BURNING;DULL
DESCRIPTORS: BURNING;DULL
DESCRIPTORS: ACHING

## 2022-10-14 ASSESSMENT — PAIN - FUNCTIONAL ASSESSMENT: PAIN_FUNCTIONAL_ASSESSMENT: ACTIVITIES ARE NOT PREVENTED

## 2022-10-14 NOTE — CONSULTS
[facility] Neurosurgery Consultation        REASON FOR CONSULTATION:  Back pain, L1 compression fracture      HISTORY OF PRESENT ILLNESS:      The patient is a 65 y.o. female who presented to the [facility] ED on 10/13/2022 complaining of weakness and back pain. She has been suffering from a flare-up of diverticulitis recently with significant diarrhea and abdominal pain. She states she stood up in her bathroom and felt like she was going to pass out and sat down on her backside. She does not feel that she fell or had a significant impact but she did experience sudden severe back pain afterwards. She was evaluated in the ED and a CT scan of her abdomen/pelvis was obtained and this revealed findings concerning for diverticulits as well as small bowel inflammation and also demonstrated an L1 compression fracture. She has been admitted to the medical service and I have been asked to provide neurosurgical consultation regarding the compression fracture. On questioning, she continues to endorse severe back pain with movement. She denies any lower extremity radicular pain, focal weakness or sensory loss. She denies loss of bowel or bladder control. Of note, she underwent a DEXA scan in 7/2022 that revealed osteopenia with a T-score of -1.2.        MEDICAL HISTORY:             Past Medical History:   Diagnosis Date    Acute renal failure ([address] 07/29/2016    discharged 8/1/16; \"due to strong antibiotic and being dehydrated\"    Arthritis     Breast cancer ([address]     surgery only    Cancer ([address]     Breast cancer    Colon polyps     Diverticulitis     GERD (gastroesophageal reflux disease)     Hyperlipidemia     Hypertension     Joint pain     Thyroid condition        Past Surgical History:   Procedure Laterality Date    ABDOMEN SURGERY      BLADDER REPAIR      BLEPHAROPLASTY      BREAST ENHANCEMENT SURGERY      BUNIONECTOMY      CATARACT REMOVAL Bilateral     COLONOSCOPY  03/10/2009    Dr Raysa Chatterjee, [place], lipoma in the proximal ascending colon, 5 yr recall    COLONOSCOPY  04/29/2014    Dr Morocho El x 2, [place], 5 yr recall    COLONOSCOPY N/A 05/21/2019    Dr Esme Church-Questionable lipoma, diverticular disease-Tubular AP (-) dysplasia, 5 yr recall    HYSTERECTOMY (CERVIX STATUS UNKNOWN)      KNEE SURGERY Left 08/17/2016    MASTECTOMY Bilateral 1984    OVARY REMOVAL      PARTIAL HYSTERECTOMY (CERVIX NOT REMOVED)      PATELLA SURGERY      THYROIDECTOMY, PARTIAL      TONSILLECTOMY AND ADENOIDECTOMY      TOTAL KNEE ARTHROPLASTY Left 08/17/2016    KNEE TOTAL ARTHROPLASTY performed by Harley Sorenson MD at [address] [place] [address] N/A 08/27/2020    Dr Esme Church-w/[id number] obstructing ring in GEJ, severe esophagitis, gastritis, (+) Marie's, indeterminate for dysplasia--1st dx, Repeat in 3 months    UPPER GASTROINTESTINAL ENDOSCOPY N/A 11/12/2020    Dr Esme Church-w/[id number]-[place] ring at the GEJ-Well healed esophagitis w/Schatzkis ring (+) Marie's, 1 yr recall    UPPER GASTROINTESTINAL ENDOSCOPY N/A 11/29/2021    Dr Esme Church-Gastritis, no Marie's, no h pylori, 3 yr recall         Current Facility-Administered Medications:     calcium-cholecalciferol 500-200 MG-UNIT per tablet 1 tablet, 1 tablet, Oral, Daily, Flor Willams MD    tiZANidine (ZANAFLEX) tablet 4 mg, 4 mg, Oral, Q6H PRN, Flor Willams MD    lidocaine 4 % external patch 1 patch, 1 patch, TransDERmal, Daily, Js Gonzalez MD, 1 patch at 10/14/22 1015    enoxaparin (LOVENOX) injection 40 mg, 40 mg, SubCUTAneous, Q24H, sJ Gonzalez MD, 40 mg at 10/13/22 1548    ondansetron (ZOFRAN-ODT) disintegrating tablet 4 mg, 4 mg, Oral, Q8H PRN **OR** ondansetron (ZOFRAN) injection 4 mg, 4 mg, IntraVENous, Q6H PRN, Js Gonzalez MD    allopurinol (ZYLOPRIM) tablet 200 mg, 200 mg, Oral, Daily, Js Gonzalez MD, 200 mg at 10/14/22 1015    levothyroxine (SYNTHROID) tablet 75 mcg, 75 mcg, Oral, Daily, Js Gonzalez MD, 75 mcg at 10/14/22 0505    pantoprazole (PROTONIX) tablet 40 mg, 40 mg, Oral, QAM AC, Jeanna Candelaria MD, 40 mg at 10/14/22 0504    atorvastatin (LIPITOR) tablet 20 mg, 20 mg, Oral, Daily, Jeanna Candelaria MD, 20 mg at 10/14/22 1015    sodium chloride flush 0.9 % injection 5-40 mL, 5-40 mL, IntraVENous, 2 times per day, Jeanna Candelaria MD, 10 mL at 10/13/22 2321    sodium chloride flush 0.9 % injection 5-40 mL, 5-40 mL, IntraVENous, PRN, Jeanna Candelaria MD    0.9 % sodium chloride infusion, , IntraVENous, PRN, Jeanna Candelaria MD    polyethylene glycol (GLYCOLAX) packet 17 g, 17 g, Oral, Daily PRN, Jeanna Candelaria MD    acetaminophen (TYLENOL) tablet 650 mg, 650 mg, Oral, Q6H PRN **OR** acetaminophen (TYLENOL) suppository 650 mg, 650 mg, Rectal, Q6H PRN, Jeanna Candelaria MD    lactated ringers infusion, , IntraVENous, Continuous, Jeanna Candelaria MD, Last Rate: 100 mL/hr at 10/14/22 0432, New Bag at 10/14/22 0432    piperacillin-tazobactam (ZOSYN) 3,375 mg in dextrose 5 % 50 mL IVPB ([id number]), 3,375 mg, IntraVENous, Q8H, Jeanna Candelaria MD, Last Rate: 12.5 mL/hr at 10/14/22 0432, 3,375 mg at 10/14/22 0432    HYDROmorphone HCl PF (DILAUDID) injection 0.5 mg, 0.5 mg, IntraVENous, Q3H PRN, JEANNA Salmon CNP, 0.5 mg at 10/13/22 1543    HYDROcodone-acetaminophen (NORCO) 5-325 MG per tablet 1 tablet, 1 tablet, Oral, Q4H PRN, JEANNA Salmon CNP, 1 tablet at 10/14/22 1015    Allergies:  Ciprofloxacin and Morphine    Social History:   Social History     Tobacco Use   Smoking Status Never   Smokeless Tobacco Never     Social History     Substance and Sexual Activity   Alcohol Use No         Family History:   Family History   Problem Relation Age of Onset    Prostate Cancer Father     Breast Cancer Sister     Lung Cancer Brother     Colon Cancer Paternal Aunt     Colon Polyps Paternal Aunt     Stomach Cancer Other     Esophageal Cancer Neg Hx     Liver Cancer Neg Hx     Liver Disease Neg Hx Rectal Cancer Neg Hx        REVIEW OF SYSTEMS:  Review of Systems   Constitutional:  Positive for chills and fatigue. HENT: Negative. Eyes: Negative. Respiratory: Negative. Cardiovascular: Negative. Gastrointestinal:  Positive for abdominal pain and diarrhea. Endocrine: Negative. Genitourinary: Negative. Musculoskeletal:  Positive for back pain, gait problem and myalgias. Skin: Negative. Allergic/Immunologic: Negative. Hematological: Negative. Psychiatric/Behavioral: Negative. PHYSICAL EXAM:    Vitals:    10/14/22 1015   BP:    Pulse:    Resp: 16   Temp:    SpO2:        Constitutional: Appears well-developed and well-nourished. Eyes - conjunctiva normal.  Pupils react to light  Ear, nose,throat -Normal pinna and tragus, No scars, or lesions over external nose or ears, no obvious atrophy of tongue  Neck- symmetric, trachea midline, no jugular vein distension, no thyromegaly  Respiration- chest wall symmetric, normal effort without use of accessory muscles  Musculoskeletal - no significant wasting of muscles noted, no bony deformities, symmetric bulk  Abdomen -- Soft, non-distended, diffusely tender to palpation  Extremities- no clubbing, cyanosis or edema  Skin - warm, dry, and intact. No rash,erythema, or pallor.   Psychiatric - mood, affect, and behavior appear normal.       NEUROLOGIC EXAM:    MENTAL STATUS: Alert, oriented, thought content appropriate    CRANIAL NERVES: PERRL, EOMI, symmetric facies, tongue midline      MOTOR:     Right Upper Extremity:    Deltoid: 5/5  Biceps: 5/5  Triceps: 5/5  : 5/5    Left Upper Extremity:    Deltoid: 5/5  Biceps: 5/5  Triceps: 5/5  : 5/5    Right Lower Extremity: (effort dependent due to back pain)    Hip Flexion: 5/5  Knee Extension: 5/5  Ankle Plantarflexion: 5/5  Ankle Dorsiflexion: 5/5      Left Lower Extremity: (effort dependent due to back pain)    Hip Flexion: 5/5  Knee Extension: 5/5  Ankle Plantarflexion: 5/5  Ankle Dorsiflexion: 5/5      SOMATOSENSORY:     Right Upper Extremity: normal light touch sensation  Left Upper Extremity: normal light touch sensation  Right Lower Extremity: normal light touch sensation  Left Lower Extremity: normal light touch sensation      REFLEXES:    Biceps: 2+  Patella: 2+      Alva's: Negative  Clonus: Negative        DATA:    Height: 5' 4\" (1.626 m), Weight: 185 lb (83.9 kg), BP: 126/64, Pain 0-10: Pain Level: 10;       Lab Results   Component Value Date    WBC 7.0 10/14/2022    HGB 11.4 (L) 10/14/2022    HCT 35.6 (L) 10/14/2022    MCV 89.9 10/14/2022     10/14/2022     Lab Results   Component Value Date     (L) 10/14/2022    K 3.8 10/14/2022    CL 97 (L) 10/14/2022    CO2 27 10/14/2022    BUN 16 10/14/2022    CREATININE 1.1 (H) 10/14/2022    GLUCOSE 97 10/14/2022    CALCIUM 8.8 10/14/2022    PROT 7.2 10/13/2022    LABALBU 4.1 10/13/2022    BILITOT 0.3 10/13/2022    ALKPHOS 72 10/13/2022    AST 24 10/13/2022    ALT 13 10/13/2022    LABGLOM 48 (A) 10/14/2022    GFRAA 58 (L) 10/14/2022    GLOB 3.9 07/29/2016     Lab Results   Component Value Date    INR 1.09 08/01/2016    PROTIME 14.1 08/01/2016       No results found. IMAGING:    My interpretation of imaging studies:  CT abdomen/pelvis from 10/13/2022 reviewed with attention to the spinal column. Compared to prior study from 2017. There is a wedge compression deformity of L1 with ~40% height loss. Posterior vertebral body height is maintained, as are the posterior elements. There is no bony retropulsion. There is a mild increase in kyphosis at the thoracolumbar junction. The L1 fracture was not present on her prior study from 2017. ASSESSMENT AND PLAN:  This is a 65 y.o. female who appears to have sustained an L1 compression fracture in the setting of a near-syncopal episode and controlled fall. She is also suffering from a flare up of diverticulitis. She has severe back pain but is neurologically intact. Her fracture does not appear to be unstable. I discussed treatment options with the patient ranging from non-operative management in an LSO brace to surgical intervention with kyphoplasty. She wishes to avoid surgery if possible and this seems reasonable. I have placed an order for an LSO brace and this should be worn whenever she is out of bed. Once she is fit for her brace she may mobilize with PT/OT. I will obtain post-mobilization upright x-rays to ensure stability of her fracture. I will also begin calcium/vitamin D supplementation given her history of osteopenia. I will defer treatment of her other issues to the medical team.  I will continue to follow along in consultation.               Charmaine Gonzalez MD

## 2022-10-14 NOTE — CARE COORDINATION
IMM Letter given to patient/spouse. Reviewed, discussed, answered questions, and signed by patient's spouse. Signed copy placed in patient's chart.      10/14/22 2824   IMM Letter   IMM Letter given to Patient/Family/Significant other/Guardian/POA/by: given to patient/spouse at bedside by Mary Chung RN BSN CM   IMM Letter date given: 10/14/22   IMM Letter time given: 0

## 2022-10-14 NOTE — PROGRESS NOTES
Call placed to [facility] @[phone]. Voicemail was left for LSO brace.

## 2022-10-14 NOTE — PLAN OF CARE
Problem: Discharge Planning  Goal: Discharge to home or other facility with appropriate resources  Outcome: Progressing  Flowsheets (Taken 10/14/2022 1015)  Discharge to home or other facility with appropriate resources: Identify barriers to discharge with patient and caregiver     Problem: Pain  Goal: Verbalizes/displays adequate comfort level or baseline comfort level  Outcome: Progressing     Problem: Safety - Adult  Goal: Free from fall injury  Outcome: Progressing

## 2022-10-14 NOTE — PROGRESS NOTES
Physical Therapy  Facility/Department: [facility] SURG SERVICES  Physical Therapy Initial Assessment    Name: Penny Roth  : 1945  MRN: [id number]  Date of Service: 10/14/2022    Discharge Recommendations:  Continue to assess pending progress, Patient would benefit from continued therapy after discharge   PT Equipment Recommendations  Other: ASSESSING NEEDS      Patient Diagnosis(es): The primary encounter diagnosis was Diverticulitis of colon. A diagnosis of Closed fracture of first lumbar vertebra, unspecified fracture morphology, initial encounter [facility]) was also pertinent to this visit. Past Medical History:  has a past medical history of Acute renal failure ([facility] Arthritis, Breast cancer ([facility] [address] [facility] Cancer ([facility] [address] [facility] Colon polyps, Diverticulitis, GERD (gastroesophageal reflux disease), Hyperlipidemia, Hypertension, Joint pain, and Thyroid condition. Past Surgical History:  has a past surgical history that includes Colonoscopy (03/10/2009); Thyroidectomy, partial; Tonsillectomy and adenoidectomy; Patella surgery; Bunionectomy; bladder repair; Blepharoplasty; Abdomen surgery; Ovary removal; Breast enhancement surgery; Total knee arthroplasty (Left, 2016); knee surgery (Left, 2016); Colonoscopy (2014); Colonoscopy (N/A, 2019); Cataract removal (Bilateral); Mastectomy (Bilateral, ); Partial hysterectomy; Hysterectomy; Upper gastrointestinal endoscopy (N/A, 2020); Upper gastrointestinal endoscopy (N/A, 2020); and Upper gastrointestinal endoscopy (N/A, 2021). Assessment   Body Structures, Functions, Activity Limitations Requiring Skilled Therapeutic Intervention: Decreased functional mobility ; Decreased strength;Decreased endurance;Decreased posture; Increased pain  Assessment: pt WOULD BENEFIT FROM SKILLED PT IN THIS SETTING TO PROGRESS HER MOBILITY POST FALL WITH L1 FX  Therapy Prognosis: Good  Decision Making: Medium Complexity  Requires PT Follow-Up: Yes  Activity Tolerance  Activity Tolerance: Patient tolerated treatment well     Plan   Physcial Therapy Plan  General Plan: 5-7 times per week  Therapy Duration: 2 Weeks  Current Treatment Recommendations: Strengthening, Functional mobility training, Transfer training, Gait training, Pain management, Return to work related activity, Safety education & training, Positioning, Therapeutic activities  Additional Comments: LSO, PROGRESS AS TOLERATED. Safety Devices  Type of Devices: Gait belt, Bed alarm in place, Call light within reach, Left in bed     Restrictions  Restrictions/Precautions  Restrictions/Precautions: Fall Risk, Contact Precautions  Required Braces or Orthoses?: Yes  Required Braces or Orthoses  Spinal Other: LSO  Position Activity Restriction  Spinal Precautions: No Bending, No Lifting, No Twisting     Subjective   General  Patient assessed for rehabilitation services?: Yes  Family / Caregiver Present: Yes ()  Diagnosis: DIVERTICULITIS, FALL AT HOME(PASSED OUT). L1 COMP FX  Follows Commands: Within Functional Limits  General Comment  Comments: LSO IN ROOM, HAS BEEN FITTED TO HER BY CORINNE LOCKHART. Subjective  Subjective: pt REPORTS NO DIZZINESS OF S/S OF  SYNCOPE DURING PT EVAL. Pt WANTS TO ATTEMPT TO TF TO BSC.  REPORTS PAIN AS MODERATE AND INTERMITTENT WITH ACTIVITY         Social/Functional History  Social/Functional History  Lives With: Spouse  Type of Home: House  Home Layout: One level  Home Access: Level entry  Bathroom Shower/Tub: Walk-in shower  Bathroom Toilet: Standard  Bathroom Equipment:  (No Bathroom DME)  Bathroom Accessibility: Accessible  Home Equipment: cami Edwards  Receives Help From: Family, Friend(s)  ADL Assistance: Independent  Homemaking Assistance: Independent  Homemaking Responsibilities: Yes  Ambulation Assistance: Independent  Transfer Assistance: Independent  Active : Yes  Mode of Transportation: Car  Occupation: Retired  Vision/Hearing       Cognition         Objective Heart Rate: 58  Heart Rate Source: Monitor  BP: 126/64  BP Location: Left upper arm  Patient Position: Supine  MAP (Calculated): 84.67  Resp: 17  SpO2: 92 %  O2 Device: None (Room air)              AROM RLE (degrees)  RLE AROM: WFL  AROM LLE (degrees)  LLE AROM : WFL  Strength Other  Other: ABLE TO STAND AND BEAR WEIGHT THRU LE'S. Bed mobility  Supine to Sit: Contact guard assistance;Minimal assistance  Sit to Supine: Minimal assistance  Bed Mobility Comments: pt MOVES VERY SLOWLY AND PAINFULLY BUT REQUIRED MIN/CGA  Transfers  Sit to Stand: Contact guard assistance;Minimal Assistance  Stand to Sit: Contact guard assistance  Ambulation  Surface: Level tile  Device: Rolling Walker  Assistance: Contact guard assistance;Minimal assistance  Quality of Gait: ANTALGIC. pt WOULD HAVE SUDDEN, SHARP LBP AND EXPERIENCE MILD KNEE BUCKLING BUT RECOVERED AND ABLE TO CONTINUE TAKING STEPS  Gait Deviations: Decreased step length; Slow Candida;Decreased step height  Distance: 3 FT TO BSC AND THEN BTB     Balance  Posture: Fair                Goals  Short Term Goals  Time Frame for Short Term Goals: 2 WKS  Short Term Goal 1: TF'S WITH RW, CGA  Short Term Goal 2:  FT WITH RW, CGA       Education  Patient Education  Education Given To: Patient  Education Provided: Role of Therapy;Plan of Care;Transfer Training  Education Method: Verbal  Barriers to Learning: None  Education Outcome: Verbalized understanding      Therapy Time   Individual Concurrent Group Co-treatment   Time In           Time Out           Minutes                   Madeleine Garcia PT   Electronically signed by Madeleine Garcia PT on 10/14/2022 at 3:30 PM

## 2022-10-14 NOTE — PROGRESS NOTES
Occupational Therapy Initial Assessment  Date: 10/14/2022   Patient Name: Joaquín Baird  MRN: [id number]     : 1945    Date of Service: 10/14/2022    Discharge Recommendations:  Patient would benefit from continued therapy after discharge       Assessment   Assessment: Evaluation completed and tx initiated. The patient would benefit from further therapy to upgrade functional status. Treatment Diagnosis: Diverticulitis, fall  REQUIRES OT FOLLOW-UP: Yes  Activity Tolerance  Activity Tolerance: Patient Tolerated treatment well  Activity Tolerance Comments: Wanted to get OOB to [facility]           Patient Diagnosis(es): The primary encounter diagnosis was Diverticulitis of colon. A diagnosis of Closed fracture of first lumbar vertebra, unspecified fracture morphology, initial encounter [facility]) was also pertinent to this visit. has a past medical history of Acute renal failure [address] [address] Arthritis, Breast cancer [address] [address] Cancer [address] [address] Colon polyps, Diverticulitis, GERD (gastroesophageal reflux disease), Hyperlipidemia, Hypertension, Joint pain, and Thyroid condition. has a past surgical history that includes Colonoscopy (03/10/2009); Thyroidectomy, partial; Tonsillectomy and adenoidectomy; Patella surgery; Bunionectomy; bladder repair; Blepharoplasty; Abdomen surgery; Ovary removal; Breast enhancement surgery; Total knee arthroplasty (Left, 2016); knee surgery (Left, 2016); Colonoscopy (2014); Colonoscopy (N/A, 2019); Cataract removal (Bilateral); Mastectomy (Bilateral, ); Partial hysterectomy; Hysterectomy; Upper gastrointestinal endoscopy (N/A, 2020); Upper gastrointestinal endoscopy (N/A, 2020); and Upper gastrointestinal endoscopy (N/A, 2021).     Treatment Diagnosis: Diverticulitis, fall      Restrictions  Restrictions/Precautions  Restrictions/Precautions: Fall Risk, Contact Precautions  Required Braces or Orthoses?: Yes  Required Braces or Orthoses  Spinal Other: LSO  Position Activity Restriction  Spinal Precautions: No Bending, No Lifting, No Twisting    Subjective   General  Chart Reviewed: Yes  Patient assessed for rehabilitation services?: Yes  Family / Caregiver Present: Yes  Pre Treatment Pain Screening  Pain at present: 3  Scale Used: Faces  Intervention List: Patient able to continue with treatment  Comments / Details: 7/10 back pain with movement    Social/Functional History  Social/Functional History  Lives With: Spouse  Type of Home: House  Home Layout: One level  Home Access: Level entry  Bathroom Shower/Tub: Walk-in shower  Bathroom Toilet: Standard  Bathroom Equipment:  (No Bathroom DME)  Bathroom Accessibility: Accessible  Home Equipment: cami Villagomez  Receives Help From: Family, Friend(s)  ADL Assistance: Independent  Homemaking Assistance: Independent  Homemaking Responsibilities: Yes  Ambulation Assistance: Independent  Transfer Assistance: Independent  Active : Yes  Mode of Transportation: Car  Occupation: Retired       Objective              Toilet Transfers  Toilet - Technique: Stand step  Equipment Used: Standard bedside commode  Toilet Transfer: Minimal assistance  ADL  Feeding: Independent  Grooming: Independent;Setup  UE Bathing: Minimal assistance  LE Bathing: Minimal assistance; Moderate assistance  UE Dressing: Supervision;Minimal assistance  LE Dressing: Moderate assistance  Toileting:  Moderate assistance        Bed mobility  Supine to Sit: Contact guard assistance;Minimal assistance  Sit to Supine: Minimal assistance  Transfers  Stand Step Transfers: Minimal assistance     Cognition  Overall Cognitive Status: WNL                 LUE AROM (degrees)  LUE General AROM: shoulder flexion 0-100, distally WNL  RUE AROM (degrees)  RUE General AROM: shoulder flexion 0-100, distally WNL                    Plan   Occupational Therapy Plan  Times Per Week: 3-5    Goals  Short Term Goals  Time Frame for Short Term Goals: 1-2 weeks  Short Term Goal 1: Min A for dressing  Short Term Goal 2: Min A for toileting  Short Term Goal 3: CGA for light ambulatory ADL  Short Term Goal 4: CGA for transfers  Short Term Goal 5: Independent with therapeutic activity recommendations  Long Term Goals  Long Term Goal 1: Upgrade as tolerated     Tx initiated:  movement strategies, brace management, toileting.   (30 mins)          Elma Molina OT/L  Electronically signed by Elam Molina OT/LAKESHA on 10/14/2022 at 3:25 PM.

## 2022-10-14 NOTE — H&P
History and Physical      CHIEF COMPLAINT: Fall    Reason for Admission: Fall, diverticulitis    History Obtained From: Patient, staff    HISTORY OF PRESENT ILLNESS:      The patient is a 65 y.o. female with significant past medical history of hypertension, hyperlipidemia, and diverticulosis who presents with approximately 1 week of abdominal pain and diarrhea progressing to involve systemic symptoms such as dizziness. Wednesday night, she had a near syncopal episode when she felt incredibly weak and dizzy and \"sat down\" in the bathroom for some time to regain her orientation. At that time she decided to present to emergency department where work-up showed likely diverticulitis. She had been treated for diverticulitis as an outpatient with Flagyl but appears to this was somewhat effective given her progression of symptoms.     Past Medical History:    Past Medical History:   Diagnosis Date    Acute renal failure ([address] 07/29/2016    discharged 8/1/16; \"due to strong antibiotic and being dehydrated\"    Arthritis     Breast cancer ([address]     surgery only    Cancer ([address]     Breast cancer    Colon polyps     Diverticulitis     GERD (gastroesophageal reflux disease)     Hyperlipidemia     Hypertension     Joint pain     Thyroid condition        Past Surgical History:    Past Surgical History:   Procedure Laterality Date    ABDOMEN SURGERY      BLADDER REPAIR      BLEPHAROPLASTY      BREAST ENHANCEMENT SURGERY      BUNIONECTOMY      CATARACT REMOVAL Bilateral     COLONOSCOPY  03/10/2009    Dr Mike Alfaro, [place], lipoma in the proximal ascending colon, 5 yr recall    COLONOSCOPY  04/29/2014    Dr Marito Rg x 2, [place], 5 yr recall    COLONOSCOPY N/A 05/21/2019    Dr Silas Church-Questionable lipoma, diverticular disease-Tubular AP (-) dysplasia, 5 yr recall    HYSTERECTOMY (CERVIX STATUS UNKNOWN)      KNEE SURGERY Left 08/17/2016    MASTECTOMY Bilateral 1984    OVARY REMOVAL      PARTIAL HYSTERECTOMY (CERVIX NOT REMOVED)      PATELLA SURGERY      THYROIDECTOMY, PARTIAL      TONSILLECTOMY AND ADENOIDECTOMY      TOTAL KNEE ARTHROPLASTY Left 08/17/2016    KNEE TOTAL ARTHROPLASTY performed by Erika Bailey MD at [facility] N/A 08/27/2020    Dr Farhana Church-w/jfw-71M-Hnogyvgam obstructing ring in [facility], severe esophagitis, gastritis, (+) Marie's, indeterminate for dysplasia--1st dx, Repeat in 3 months    UPPER GASTROINTESTINAL ENDOSCOPY N/A 11/12/2020    Dr Farhana Church-w/vmw-30M-Qiwehczlpvrw ring at the [facility]-Well healed esophagitis w/Schatzkis ring (+) Marie's, 1 yr recall    UPPER GASTROINTESTINAL ENDOSCOPY N/A 11/29/2021    Dr Farhana Church-Gastritis, no Marie's, no h pylori, 3 yr recall       Medications Prior to Admission:    Medications Prior to Admission: allopurinol (ZYLOPRIM) 100 MG tablet, Take 200 mg by mouth daily  omeprazole (PRILOSEC) 40 MG delayed release capsule, Take 1 capsule by mouth every morning (before breakfast)  olmesartan-hydroCHLOROthiazide (BENICAR HCT) 40-12.5 MG per tablet, Take 1 tablet by mouth daily  levothyroxine (SYNTHROID) 50 MCG tablet, Take of the 100 mcg Synthroid - use two 50s (Patient taking differently: Take 75 mcg by mouth Daily)  simvastatin (ZOCOR) 20 MG tablet, Take 20 mg by mouth nightly     Allergies:  Ciprofloxacin and Morphine    Social History:   Social History     Socioeconomic History    Marital status:      Spouse name: Not on file    Number of children: Not on file    Years of education: Not on file    Highest education level: Not on file   Occupational History    Not on file   Tobacco Use    Smoking status: Never    Smokeless tobacco: Never   Vaping Use    Vaping Use: Never used   Substance and Sexual Activity    Alcohol use: No    Drug use: No    Sexual activity: Not on file   Other Topics Concern    Not on file   Social History Narrative    Not on file     Social Determinants of Health     Financial Resource Strain: Not on file   Food Insecurity: Not on file   Transportation Needs: Not on file   Physical Activity: Not on file   Stress: Not on file   Social Connections: Not on file   Intimate Partner Violence: Not on file   Housing Stability: Not on file       Family History:   Family History   Problem Relation Age of Onset    Prostate Cancer Father     Breast Cancer Sister     Lung Cancer Brother     Colon Cancer Paternal Aunt     Colon Polyps Paternal Aunt     Stomach Cancer Other     Esophageal Cancer Neg Hx     Liver Cancer Neg Hx     Liver Disease Neg Hx     Rectal Cancer Neg Hx        REVIEW OF SYSTEMS:    CONSTITUTIONAL:  Negative for anorexia, chills, fevers, night sweats and weight loss, patient has been relatively medically stable until illness outlined in HPI  EYES:  negative for eye dryness, icterus and redness, no significant changes in vision  HEENT:   negative for dental problems, epistaxis or sinus congestion , no history of facial trauma or difficulty swallowing  RESPIRATORY:  negative for chest tightness, cough, dyspnea on exertion, pneumonia or worse sputum production  CARDIOVASCULAR: No history of chest pain, dyspnea, exertional chest pressure/discomfort, irregular heart beat, or PND  GASTROINTESTINAL:  negative for abdominal pain, nausea or vomiting, no history of hematemesis, jaundice, melena or rectal bleeding  MUSCULOSKELETAL:  negative for muscle weakness, myalgias and neck pain, no significant arthralgias  NEUROLOGICAL:   negative for dizziness, headaches, seizures, speech problems, tremors and vertigo, mentation has been at baseline  INTEGUMENT: negative for pruritus, rash, skin color change and skin lesion(s)       Vital Signs   [unfilled]          Physical Exam:  Constitutional: The patient is oriented to person, place, and time. They appear well-developed. Able to answer questions appropriately  HEENT: Grossly normal sitting up in bed  Head: Normocephalic and atraumatic.    Eyes: Pupils are equal, round, and reactive to light. Extraocular muscles appear to be intact  Neck: Neck supple without masses or carotid bruit. Cardiovascular: Regular rhythm and normal heart sounds. No extra or lift rub or murmur appreciated  Pulmonary/Chest: Effort normal and breath sounds normal. CTAB, no appreciable rales or wheezes  Abdominal: Soft. Bowel sounds are normal. There is  no distension or tenderness appreciated. There is no rebound and no guarding. Musculoskeletal: Normal range of motion. There is  no edema or tenderness. No significant joint swelling  Neurological: Pt is alert and oriented to person, place, and time. They have normal reflexes. CN 2-12 appear grossly intact  Skin: Skin is warm and dry without significant rashes     Results Review:   I reviewed the patient's new imaging results and agree with the interpretation. ASSESSMENT AND PLAN:      1. Principal Problem:    Diverticulitis  Resolved Problems:    * No resolved hospital problems. *    Assessment: 50-year-old female with past medical history of hypertension, hyperlipidemia who presents for diverticulitis with systemic symptoms. Plan:    Acute diverticulitis  Continue Zosyn, continue IV fluids. GI PCR pending to identify if there is an alternative infectious etiology for diarrhea. Compression fracture  Neurosurgery consulted, patient very concerned about fracture and what treatment plan would be.     Yariel Richardson MD  8:30 AM 10/14/2022

## 2022-10-15 LAB
ANION GAP SERPL CALCULATED.3IONS-SCNC: 12 MMOL/L (ref 7–19)
BASOPHILS ABSOLUTE: 0 K/UL (ref 0–0.2)
BASOPHILS RELATIVE PERCENT: 0.5 % (ref 0–1)
BUN BLDV-MCNC: 14 MG/DL (ref 8–23)
CALCIUM SERPL-MCNC: 8.6 MG/DL (ref 8.8–10.2)
CHLORIDE BLD-SCNC: 92 MMOL/L (ref 98–111)
CO2: 27 MMOL/L (ref 22–29)
CREAT SERPL-MCNC: 1 MG/DL (ref 0.5–0.9)
EOSINOPHILS ABSOLUTE: 0.4 K/UL (ref 0–0.6)
EOSINOPHILS RELATIVE PERCENT: 5.4 % (ref 0–5)
GFR AFRICAN AMERICAN: >59
GFR NON-AFRICAN AMERICAN: 54
GLUCOSE BLD-MCNC: 109 MG/DL (ref 74–109)
HCT VFR BLD CALC: 31.8 % (ref 37–47)
HEMOGLOBIN: 10.5 G/DL (ref 12–16)
IMMATURE GRANULOCYTES #: 0 K/UL
LYMPHOCYTES ABSOLUTE: 2 K/UL (ref 1.1–4.5)
LYMPHOCYTES RELATIVE PERCENT: 25.8 % (ref 20–40)
MCH RBC QN AUTO: 29.2 PG (ref 27–31)
MCHC RBC AUTO-ENTMCNC: 33 G/DL (ref 33–37)
MCV RBC AUTO: 88.6 FL (ref 81–99)
MONOCYTES ABSOLUTE: 0.8 K/UL (ref 0–0.9)
MONOCYTES RELATIVE PERCENT: 9.8 % (ref 0–10)
NEUTROPHILS ABSOLUTE: 4.4 K/UL (ref 1.5–7.5)
NEUTROPHILS RELATIVE PERCENT: 58 % (ref 50–65)
PDW BLD-RTO: 13.5 % (ref 11.5–14.5)
PLATELET # BLD: 269 K/UL (ref 130–400)
PMV BLD AUTO: 10.3 FL (ref 9.4–12.3)
POTASSIUM REFLEX MAGNESIUM: 3.8 MMOL/L (ref 3.5–5)
RBC # BLD: 3.59 M/UL (ref 4.2–5.4)
SODIUM BLD-SCNC: 131 MMOL/L (ref 136–145)
WBC # BLD: 7.6 K/UL (ref 4.8–10.8)

## 2022-10-15 PROCEDURE — 6370000000 HC RX 637 (ALT 250 FOR IP): Performed by: NEUROLOGICAL SURGERY

## 2022-10-15 PROCEDURE — 6370000000 HC RX 637 (ALT 250 FOR IP): Performed by: NURSE PRACTITIONER

## 2022-10-15 PROCEDURE — 6370000000 HC RX 637 (ALT 250 FOR IP): Performed by: STUDENT IN AN ORGANIZED HEALTH CARE EDUCATION/TRAINING PROGRAM

## 2022-10-15 PROCEDURE — 6360000002 HC RX W HCPCS: Performed by: STUDENT IN AN ORGANIZED HEALTH CARE EDUCATION/TRAINING PROGRAM

## 2022-10-15 PROCEDURE — 97116 GAIT TRAINING THERAPY: CPT

## 2022-10-15 PROCEDURE — 80048 BASIC METABOLIC PNL TOTAL CA: CPT

## 2022-10-15 PROCEDURE — 99232 SBSQ HOSP IP/OBS MODERATE 35: CPT | Performed by: NEUROLOGICAL SURGERY

## 2022-10-15 PROCEDURE — 97530 THERAPEUTIC ACTIVITIES: CPT

## 2022-10-15 PROCEDURE — 85025 COMPLETE CBC W/AUTO DIFF WBC: CPT

## 2022-10-15 PROCEDURE — 2580000003 HC RX 258: Performed by: STUDENT IN AN ORGANIZED HEALTH CARE EDUCATION/TRAINING PROGRAM

## 2022-10-15 PROCEDURE — 1210000000 HC MED SURG R&B

## 2022-10-15 PROCEDURE — 36415 COLL VENOUS BLD VENIPUNCTURE: CPT

## 2022-10-15 RX ADMIN — LEVOTHYROXINE SODIUM 75 MCG: 75 TABLET ORAL at 05:48

## 2022-10-15 RX ADMIN — ATORVASTATIN CALCIUM 20 MG: 20 TABLET, FILM COATED ORAL at 07:50

## 2022-10-15 RX ADMIN — HYDROCODONE BITARTRATE AND ACETAMINOPHEN 1 TABLET: 5; 325 TABLET ORAL at 21:39

## 2022-10-15 RX ADMIN — PANTOPRAZOLE SODIUM 40 MG: 40 TABLET, DELAYED RELEASE ORAL at 05:48

## 2022-10-15 RX ADMIN — PIPERACILLIN AND TAZOBACTAM 3375 MG: 3; .375 INJECTION, POWDER, LYOPHILIZED, FOR SOLUTION INTRAVENOUS at 18:06

## 2022-10-15 RX ADMIN — TIZANIDINE 4 MG: 4 TABLET ORAL at 02:45

## 2022-10-15 RX ADMIN — PIPERACILLIN AND TAZOBACTAM 3375 MG: 3; .375 INJECTION, POWDER, LYOPHILIZED, FOR SOLUTION INTRAVENOUS at 10:27

## 2022-10-15 RX ADMIN — HYDROCODONE BITARTRATE AND ACETAMINOPHEN 1 TABLET: 5; 325 TABLET ORAL at 14:15

## 2022-10-15 RX ADMIN — TIZANIDINE 4 MG: 4 TABLET ORAL at 18:03

## 2022-10-15 RX ADMIN — HYDROCODONE BITARTRATE AND ACETAMINOPHEN 1 TABLET: 5; 325 TABLET ORAL at 18:03

## 2022-10-15 RX ADMIN — Medication 1 TABLET: at 07:50

## 2022-10-15 RX ADMIN — ENOXAPARIN SODIUM 40 MG: 100 INJECTION SUBCUTANEOUS at 14:15

## 2022-10-15 RX ADMIN — ALLOPURINOL 200 MG: 100 TABLET ORAL at 07:50

## 2022-10-15 RX ADMIN — PIPERACILLIN AND TAZOBACTAM 3375 MG: 3; .375 INJECTION, POWDER, LYOPHILIZED, FOR SOLUTION INTRAVENOUS at 04:08

## 2022-10-15 RX ADMIN — HYDROCODONE BITARTRATE AND ACETAMINOPHEN 1 TABLET: 5; 325 TABLET ORAL at 06:45

## 2022-10-15 RX ADMIN — HYDROCODONE BITARTRATE AND ACETAMINOPHEN 1 TABLET: 5; 325 TABLET ORAL at 10:22

## 2022-10-15 RX ADMIN — SODIUM CHLORIDE, PRESERVATIVE FREE 10 ML: 5 INJECTION INTRAVENOUS at 07:51

## 2022-10-15 RX ADMIN — HYDROCODONE BITARTRATE AND ACETAMINOPHEN 1 TABLET: 5; 325 TABLET ORAL at 02:44

## 2022-10-15 RX ADMIN — SODIUM CHLORIDE, SODIUM LACTATE, POTASSIUM CHLORIDE, AND CALCIUM CHLORIDE: 600; 310; 30; 20 INJECTION, SOLUTION INTRAVENOUS at 07:48

## 2022-10-15 ASSESSMENT — ENCOUNTER SYMPTOMS
BACK PAIN: 1
COLOR CHANGE: 0
SHORTNESS OF BREATH: 0
NAUSEA: 1
DIARRHEA: 1
VOMITING: 0
WHEEZING: 0
ABDOMINAL PAIN: 1
COUGH: 0

## 2022-10-15 ASSESSMENT — PAIN DESCRIPTION - LOCATION
LOCATION: BACK

## 2022-10-15 ASSESSMENT — PAIN DESCRIPTION - DESCRIPTORS
DESCRIPTORS: THROBBING;ACHING
DESCRIPTORS: ACHING
DESCRIPTORS: THROBBING;DULL
DESCRIPTORS: ACHING
DESCRIPTORS: ACHING
DESCRIPTORS: ACHING;SHARP;DULL
DESCRIPTORS: ACHING

## 2022-10-15 ASSESSMENT — PAIN SCALES - GENERAL
PAINLEVEL_OUTOF10: 5
PAINLEVEL_OUTOF10: 10
PAINLEVEL_OUTOF10: 5
PAINLEVEL_OUTOF10: 8
PAINLEVEL_OUTOF10: 10
PAINLEVEL_OUTOF10: 10
PAINLEVEL_OUTOF10: 5

## 2022-10-15 ASSESSMENT — PAIN DESCRIPTION - ORIENTATION
ORIENTATION: LOWER

## 2022-10-15 NOTE — PROGRESS NOTES
Physical Therapy  Naya Kearns  [id number]     10/15/22 6762   Subjective   Subjective Agrees to work with therapy. Patient states she is having a lot of pain but wants to move around and get off of her back. Bed Mobility   Supine to Sit Contact guard assistance  (patient transfers well when given extra time to move at her pace)   Sit to Supine Contact guard assistance  (patient moves well when given extra time to move at her pace)   Transfers   Sit to Stand Contact guard assistance   Stand to Sit Contact guard assistance   Ambulation   Surface Level tile   Device Rolling Walker   Assistance Contact guard assistance   Quality of Gait antalgic, guarded secondary to pain   Distance 20'   Other Activities   Comment Patient did well with therapy. Patient transferred bed to [facility] but unable to urinate at this time. Patient ambulated in room, short distance, slow pace. Patient requested to return to bed, positioned for comfort on her right side with pillows for support and comfort. All needs in reach and spouse present. Short Term Goals   Time Frame for Short Term Goals 2 WKS   Short Term Goal 1 TF'S WITH RW, CGA   Short Term Goal 2  FT WITH RW, CGA   Activity Tolerance   Activity Tolerance Patient tolerated treatment well   PT Plan of Care   Saturday X   Safety Devices   Type of Devices Call light within reach; Left in bed   Electronically signed by Deanna Halsted, PTA on 10/15/2022 at 2:09 PM

## 2022-10-15 NOTE — PROGRESS NOTES
NEUROSURGERY PROGRESS NOTE      Chief Complaint:   Chief Complaint   Patient presents with    Back Pain     Pt c/o low back pain after lying in the floor all night. Dizziness     Pt states she got up in the middle of the night to go to the restroom. States she got very dizzy and laid in the floor to prevent passing out or falling. Denies LOC. Interval Update:    No overnight events. Patient fit for LSO brace yesterday and she was able to get [facility] to [facility] with [facility]. She continue to endorse significant back pain. No radicular pain, lower extremity weakness or sensory loss. HPI:     The patient is a 65 y.o. female who presented to the [facility] ED on 10/13/2022 complaining of weakness and back pain. She has been suffering from a flare-up of diverticulitis recently with significant diarrhea and abdominal pain. She states she stood up in her bathroom and felt like she was going to pass out and sat down on her backside. She does not feel that she fell or had a significant impact but she did experience sudden severe back pain afterwards. She was evaluated in the ED and a CT scan of her abdomen/pelvis was obtained and this revealed findings concerning for diverticulits as well as small bowel inflammation and also demonstrated an L1 compression fracture. She has been admitted to the medical service and I have been asked to provide neurosurgical consultation regarding the compression fracture. On questioning, she continues to endorse severe back pain with movement. She denies any lower extremity radicular pain, focal weakness or sensory loss. She denies loss of bowel or bladder control. Of note, she underwent a DEXA scan in 7/2022 that revealed osteopenia with a T-score of -1.2.        Objective:    /66   Pulse (!) 46   Temp 97.5 °F (36.4 °C) (Temporal)   Resp 17   Ht 5' 4\" (1.626 m)   Wt 185 lb (83.9 kg)   SpO2 94%   BMI 31.76 kg/m²       Intake/Output Summary (Last 24 hours) at 10/15/2022 1123  Last data filed at 10/15/2022 0943  Gross per 24 hour   Intake 1180 ml   Output 800 ml   Net 380 ml           Physical Exam:    General: alert, cooperative, no distress  Cardiorespiratory: unlabored breathing  Abdomen: soft and nondistended      Neurologic Exam:    Mental Status: Alert, oriented, thought content appropriate  Cranial Nerves: PERRL, EOMI, symmetric facies, tongue midline  Motor: Motor exam is symmetrical 5 out of 5 all extremities bilaterally  Somatosensory: normal light touch sensation            Pertinent Labs:  Lab Results   Component Value Date    WBC 7.6 10/15/2022    HGB 10.5 (L) 10/15/2022    HCT 31.8 (L) 10/15/2022    MCV 88.6 10/15/2022     10/15/2022     Lab Results   Component Value Date/Time     10/15/2022 03:57 AM    K 3.8 10/15/2022 03:57 AM    CL 92 10/15/2022 03:57 AM    CO2 27 10/15/2022 03:57 AM    BUN 14 10/15/2022 03:57 AM    CREATININE 1.0 10/15/2022 03:57 AM    GLUCOSE 109 10/15/2022 03:57 AM    CALCIUM 8.6 10/15/2022 03:57 AM              Assessment and Plan: This is a 65 y.o. female who has sustained an L1 compression fracture after a near-syncopal episode and controlled fall. She is also suffering from a flare up of diverticulitis. She has severe back pain but is neurologically intact. Her fracture does not appear to be unstable.       -Mobilize with PT/OT in LSO brace  -Obtain post-mobilization upright x-rays  -Pain control  -If pain cannot be controlled or she is unable to mobilize, she would be a candidate for kyphoplasty  -Will continue to follow along in consultation    Electronically signed by Madhu Jones MD on 10/15/2022 at 11:23 AM

## 2022-10-15 NOTE — PROGRESS NOTES
Daily Progress Note  Agustin Brand  MRN: [id number] LOS: 2    Admit Date: 10/13/2022   10/15/2022 2:36 PM    Subjective:          Chief Complaint:  Chief Complaint   Patient presents with    Back Pain     Pt c/o low back pain after lying in the floor all night. Dizziness     Pt states she got up in the middle of the night to go to the restroom. States she got very dizzy and laid in the floor to prevent passing out or falling. Denies LOC. Interval History:    Reviewed overnight events and nursing notes. Status:  improved  Pain:  some relief        Review of Systems   Constitutional:  Positive for fatigue. Negative for activity change and fever. Respiratory:  Negative for cough, shortness of breath and wheezing. Gastrointestinal:  Positive for abdominal pain, diarrhea and nausea. Negative for vomiting. Musculoskeletal:  Positive for back pain. Skin:  Negative for color change. Neurological:  Positive for dizziness and weakness. DIET:  ADULT DIET;  Full Liquid    Medications:      sodium chloride      lactated ringers 100 mL/hr at 10/15/22 9913      calcium-cholecalciferol  1 tablet Oral Daily    lidocaine  1 patch TransDERmal Daily    enoxaparin  40 mg SubCUTAneous Q24H    allopurinol  200 mg Oral Daily    levothyroxine  75 mcg Oral Daily    pantoprazole  40 mg Oral QAM AC    atorvastatin  20 mg Oral Daily    sodium chloride flush  5-40 mL IntraVENous 2 times per day    piperacillin-tazobactam  3,375 mg IntraVENous Q8H       Data:     Code Status: Full Code    Family History   Problem Relation Age of Onset    Prostate Cancer Father     Breast Cancer Sister     Lung Cancer Brother     Colon Cancer Paternal Aunt     Colon Polyps Paternal Aunt     Stomach Cancer Other     Esophageal Cancer Neg Hx     Liver Cancer Neg Hx     Liver Disease Neg Hx     Rectal Cancer Neg Hx      Social History     Socioeconomic History    Marital status:      Spouse name: Not on file    Number of children: Diverticulitis  Resolved Problems:    * No resolved hospital problems. *      PMH:  Past Medical History:   Diagnosis Date    Acute renal failure ([facility] [address] 07/29/2016    discharged 8/1/16; \"due to strong antibiotic and being dehydrated\"    Arthritis     Breast cancer ([facility]     surgery only    Cancer ([facility]     Breast cancer    Colon polyps     Diverticulitis     GERD (gastroesophageal reflux disease)     Hyperlipidemia     Hypertension     Joint pain     Thyroid condition        Assessment: 65-year-old female with past medical history of hypertension, hyperlipidemia who presents for diverticulitis with systemic symptoms. Plan:    Acute diverticulitis  Improvement. Continue Zosyn, continue IV fluids. GI PCR negative. Compression fracture  Neurosurgery consulted and following. No surgery recommended at this time. Reviewed treatment plans with the patient and/or family. 30 minutes spent in face to face interaction and coordination of care.      Electronically signed by Chele Moore MD on 10/15/2022 at 2:36 PM

## 2022-10-16 ENCOUNTER — APPOINTMENT (OUTPATIENT)
Dept: GENERAL RADIOLOGY | Age: 77
DRG: 516 | End: 2022-10-16
Payer: MEDICARE

## 2022-10-16 LAB
ANION GAP SERPL CALCULATED.3IONS-SCNC: 11 MMOL/L (ref 7–19)
BASOPHILS ABSOLUTE: 0.1 K/UL (ref 0–0.2)
BASOPHILS RELATIVE PERCENT: 0.7 % (ref 0–1)
BUN BLDV-MCNC: 9 MG/DL (ref 8–23)
CALCIUM SERPL-MCNC: 9 MG/DL (ref 8.8–10.2)
CHLORIDE BLD-SCNC: 96 MMOL/L (ref 98–111)
CO2: 29 MMOL/L (ref 22–29)
CREAT SERPL-MCNC: 0.8 MG/DL (ref 0.5–0.9)
EOSINOPHILS ABSOLUTE: 0.3 K/UL (ref 0–0.6)
EOSINOPHILS RELATIVE PERCENT: 4.2 % (ref 0–5)
GFR AFRICAN AMERICAN: >59
GFR NON-AFRICAN AMERICAN: >60
GLUCOSE BLD-MCNC: 104 MG/DL (ref 74–109)
HCT VFR BLD CALC: 34.2 % (ref 37–47)
HEMOGLOBIN: 11.3 G/DL (ref 12–16)
IMMATURE GRANULOCYTES #: 0.1 K/UL
LYMPHOCYTES ABSOLUTE: 1.8 K/UL (ref 1.1–4.5)
LYMPHOCYTES RELATIVE PERCENT: 23.7 % (ref 20–40)
MCH RBC QN AUTO: 28.8 PG (ref 27–31)
MCHC RBC AUTO-ENTMCNC: 33 G/DL (ref 33–37)
MCV RBC AUTO: 87 FL (ref 81–99)
MONOCYTES ABSOLUTE: 0.6 K/UL (ref 0–0.9)
MONOCYTES RELATIVE PERCENT: 8.3 % (ref 0–10)
NEUTROPHILS ABSOLUTE: 4.7 K/UL (ref 1.5–7.5)
NEUTROPHILS RELATIVE PERCENT: 61.8 % (ref 50–65)
PDW BLD-RTO: 13.3 % (ref 11.5–14.5)
PLATELET # BLD: 319 K/UL (ref 130–400)
PMV BLD AUTO: 9.9 FL (ref 9.4–12.3)
POTASSIUM REFLEX MAGNESIUM: 4.2 MMOL/L (ref 3.5–5)
RBC # BLD: 3.93 M/UL (ref 4.2–5.4)
SODIUM BLD-SCNC: 136 MMOL/L (ref 136–145)
WBC # BLD: 7.6 K/UL (ref 4.8–10.8)

## 2022-10-16 PROCEDURE — 6360000002 HC RX W HCPCS: Performed by: NURSE PRACTITIONER

## 2022-10-16 PROCEDURE — 97116 GAIT TRAINING THERAPY: CPT

## 2022-10-16 PROCEDURE — 99231 SBSQ HOSP IP/OBS SF/LOW 25: CPT | Performed by: NEUROLOGICAL SURGERY

## 2022-10-16 PROCEDURE — 6370000000 HC RX 637 (ALT 250 FOR IP): Performed by: NEUROLOGICAL SURGERY

## 2022-10-16 PROCEDURE — 36415 COLL VENOUS BLD VENIPUNCTURE: CPT

## 2022-10-16 PROCEDURE — 72100 X-RAY EXAM L-S SPINE 2/3 VWS: CPT

## 2022-10-16 PROCEDURE — 97530 THERAPEUTIC ACTIVITIES: CPT

## 2022-10-16 PROCEDURE — 6370000000 HC RX 637 (ALT 250 FOR IP): Performed by: STUDENT IN AN ORGANIZED HEALTH CARE EDUCATION/TRAINING PROGRAM

## 2022-10-16 PROCEDURE — 6370000000 HC RX 637 (ALT 250 FOR IP): Performed by: NURSE PRACTITIONER

## 2022-10-16 PROCEDURE — 80048 BASIC METABOLIC PNL TOTAL CA: CPT

## 2022-10-16 PROCEDURE — 2580000003 HC RX 258: Performed by: STUDENT IN AN ORGANIZED HEALTH CARE EDUCATION/TRAINING PROGRAM

## 2022-10-16 PROCEDURE — 6360000002 HC RX W HCPCS: Performed by: STUDENT IN AN ORGANIZED HEALTH CARE EDUCATION/TRAINING PROGRAM

## 2022-10-16 PROCEDURE — 72110 X-RAY EXAM L-2 SPINE 4/>VWS: CPT | Performed by: RADIOLOGY

## 2022-10-16 PROCEDURE — 85025 COMPLETE CBC W/AUTO DIFF WBC: CPT

## 2022-10-16 PROCEDURE — 1210000000 HC MED SURG R&B

## 2022-10-16 RX ADMIN — SODIUM CHLORIDE, PRESERVATIVE FREE 10 ML: 5 INJECTION INTRAVENOUS at 01:14

## 2022-10-16 RX ADMIN — SODIUM CHLORIDE, PRESERVATIVE FREE 10 ML: 5 INJECTION INTRAVENOUS at 09:00

## 2022-10-16 RX ADMIN — PIPERACILLIN AND TAZOBACTAM 3375 MG: 3; .375 INJECTION, POWDER, LYOPHILIZED, FOR SOLUTION INTRAVENOUS at 05:46

## 2022-10-16 RX ADMIN — PIPERACILLIN AND TAZOBACTAM 3375 MG: 3; .375 INJECTION, POWDER, LYOPHILIZED, FOR SOLUTION INTRAVENOUS at 11:29

## 2022-10-16 RX ADMIN — HYDROCODONE BITARTRATE AND ACETAMINOPHEN 1 TABLET: 5; 325 TABLET ORAL at 18:58

## 2022-10-16 RX ADMIN — TIZANIDINE 4 MG: 4 TABLET ORAL at 01:16

## 2022-10-16 RX ADMIN — Medication 1 TABLET: at 08:59

## 2022-10-16 RX ADMIN — PANTOPRAZOLE SODIUM 40 MG: 40 TABLET, DELAYED RELEASE ORAL at 05:47

## 2022-10-16 RX ADMIN — HYDROMORPHONE HYDROCHLORIDE 0.5 MG: 1 INJECTION, SOLUTION INTRAMUSCULAR; INTRAVENOUS; SUBCUTANEOUS at 20:23

## 2022-10-16 RX ADMIN — PIPERACILLIN AND TAZOBACTAM 3375 MG: 3; .375 INJECTION, POWDER, LYOPHILIZED, FOR SOLUTION INTRAVENOUS at 18:59

## 2022-10-16 RX ADMIN — HYDROCODONE BITARTRATE AND ACETAMINOPHEN 1 TABLET: 5; 325 TABLET ORAL at 05:47

## 2022-10-16 RX ADMIN — ALLOPURINOL 200 MG: 100 TABLET ORAL at 08:59

## 2022-10-16 RX ADMIN — HYDROCODONE BITARTRATE AND ACETAMINOPHEN 1 TABLET: 5; 325 TABLET ORAL at 14:52

## 2022-10-16 RX ADMIN — HYDROCODONE BITARTRATE AND ACETAMINOPHEN 1 TABLET: 5; 325 TABLET ORAL at 10:21

## 2022-10-16 RX ADMIN — ENOXAPARIN SODIUM 40 MG: 100 INJECTION SUBCUTANEOUS at 14:52

## 2022-10-16 RX ADMIN — ATORVASTATIN CALCIUM 20 MG: 20 TABLET, FILM COATED ORAL at 08:59

## 2022-10-16 RX ADMIN — HYDROCODONE BITARTRATE AND ACETAMINOPHEN 1 TABLET: 5; 325 TABLET ORAL at 01:15

## 2022-10-16 RX ADMIN — TIZANIDINE 4 MG: 4 TABLET ORAL at 10:22

## 2022-10-16 RX ADMIN — LEVOTHYROXINE SODIUM 75 MCG: 75 TABLET ORAL at 05:47

## 2022-10-16 RX ADMIN — SODIUM CHLORIDE, SODIUM LACTATE, POTASSIUM CHLORIDE, AND CALCIUM CHLORIDE: 600; 310; 30; 20 INJECTION, SOLUTION INTRAVENOUS at 10:10

## 2022-10-16 ASSESSMENT — PAIN DESCRIPTION - LOCATION
LOCATION: ABDOMEN
LOCATION: BACK
LOCATION: ABDOMEN
LOCATION: BACK

## 2022-10-16 ASSESSMENT — ENCOUNTER SYMPTOMS
ABDOMINAL PAIN: 1
VOMITING: 0
DIARRHEA: 1
WHEEZING: 0
COUGH: 0
SHORTNESS OF BREATH: 0
COLOR CHANGE: 0
NAUSEA: 1
BACK PAIN: 1

## 2022-10-16 ASSESSMENT — PAIN SCALES - GENERAL
PAINLEVEL_OUTOF10: 7
PAINLEVEL_OUTOF10: 10
PAINLEVEL_OUTOF10: 5
PAINLEVEL_OUTOF10: 7
PAINLEVEL_OUTOF10: 3
PAINLEVEL_OUTOF10: 7
PAINLEVEL_OUTOF10: 5
PAINLEVEL_OUTOF10: 10

## 2022-10-16 ASSESSMENT — PAIN DESCRIPTION - DESCRIPTORS
DESCRIPTORS: ACHING;BURNING;THROBBING
DESCRIPTORS: ACHING
DESCRIPTORS: ACHING
DESCRIPTORS: CRAMPING
DESCRIPTORS: BURNING;THROBBING;SORE
DESCRIPTORS: CRAMPING
DESCRIPTORS: ACHING

## 2022-10-16 ASSESSMENT — PAIN DESCRIPTION - ORIENTATION
ORIENTATION: MID
ORIENTATION: MID
ORIENTATION: LOWER

## 2022-10-16 ASSESSMENT — PAIN DESCRIPTION - PAIN TYPE: TYPE: ACUTE PAIN

## 2022-10-16 ASSESSMENT — PAIN DESCRIPTION - ONSET: ONSET: ON-GOING

## 2022-10-16 ASSESSMENT — PAIN DESCRIPTION - FREQUENCY: FREQUENCY: INTERMITTENT

## 2022-10-16 ASSESSMENT — PAIN DESCRIPTION - DIRECTION: RADIATING_TOWARDS: NO

## 2022-10-16 ASSESSMENT — PAIN - FUNCTIONAL ASSESSMENT
PAIN_FUNCTIONAL_ASSESSMENT: ACTIVITIES ARE NOT PREVENTED
PAIN_FUNCTIONAL_ASSESSMENT: ACTIVITIES ARE NOT PREVENTED

## 2022-10-16 NOTE — PROGRESS NOTES
Physical Therapy  Marina Santiago  [id number]     10/16/22 1026   Subjective   Subjective Agrees to work with therapy. Bed Mobility   Supine to Sit Stand by assistance   Sit to Supine Contact guard assistance  (assist with LE's)   Transfers   Sit to Stand Stand by assistance   Stand to Sit Stand by assistance   Ambulation   Surface Level tile   Device Rolling Walker   Assistance Contact guard assistance   Quality of Gait slow, guarded, steady, no LOB noted, IV   Distance 40'   Other Activities   Comment Patient did well with therapy. Patient tolerated increased gait distance this treatment. Patient declined sitting in the recliner secondary to sitting causing increased pain. Patient positioned on her side with pillows for support and comfort with all needs in reach. Short Term Goals   Time Frame for Short Term Goals 2 WKS   Short Term Goal 1 TF'S WITH RW, CGA   Short Term Goal 2  FT WITH RW, CGA   Activity Tolerance   Activity Tolerance Patient tolerated treatment well   PT Plan of Care   Sunday X   Safety Devices   Type of Devices Call light within reach; Left in bed   Electronically signed by Kelsea Costa, ALYSSA on 10/16/2022 at 10:34 AM

## 2022-10-16 NOTE — PROGRESS NOTES
Daily Progress Note  Tamir Young  MRN: [id number] LOS: 3    Admit Date: 10/13/2022   10/16/2022 2:04 PM    Subjective:          Chief Complaint:  Chief Complaint   Patient presents with    Back Pain     Pt c/o low back pain after lying in the floor all night. Dizziness     Pt states she got up in the middle of the night to go to the restroom. States she got very dizzy and laid in the floor to prevent passing out or falling. Denies LOC. Interval History:    Reviewed overnight events and nursing notes. Status:  improved, but back pain is actually slightly worse today  Pain:   back pain is worse        Review of Systems   Constitutional:  Positive for fatigue. Negative for activity change and fever. Respiratory:  Negative for cough, shortness of breath and wheezing. Gastrointestinal:  Positive for abdominal pain, diarrhea and nausea. Negative for vomiting. Musculoskeletal:  Positive for back pain. Skin:  Negative for color change. Neurological:  Positive for weakness. Negative for dizziness. DIET:  ADULT DIET;  Full Liquid    Medications:      sodium chloride      lactated ringers 100 mL/hr at 10/16/22 1010      calcium-cholecalciferol  1 tablet Oral Daily    lidocaine  1 patch TransDERmal Daily    enoxaparin  40 mg SubCUTAneous Q24H    allopurinol  200 mg Oral Daily    levothyroxine  75 mcg Oral Daily    pantoprazole  40 mg Oral QAM AC    atorvastatin  20 mg Oral Daily    sodium chloride flush  5-40 mL IntraVENous 2 times per day    piperacillin-tazobactam  3,375 mg IntraVENous Q8H       Data:     Code Status: Full Code    Family History   Problem Relation Age of Onset    Prostate Cancer Father     Breast Cancer Sister     Lung Cancer Brother     Colon Cancer Paternal Aunt     Colon Polyps Paternal Aunt     Stomach Cancer Other     Esophageal Cancer Neg Hx     Liver Cancer Neg Hx     Liver Disease Neg Hx     Rectal Cancer Neg Hx      Social History     Socioeconomic History    Marital

## 2022-10-16 NOTE — PROGRESS NOTES
NEUROSURGERY PROGRESS NOTE      Chief Complaint:   Chief Complaint   Patient presents with    Back Pain     Pt c/o low back pain after lying in the floor all night. Dizziness     Pt states she got up in the middle of the night to go to the restroom. States she got very dizzy and laid in the floor to prevent passing out or falling. Denies LOC. Interval Update:    No overnight events. Patient has done some limited mobilization with PT/OT. She continues to endorse severe back pain. HPI:     The patient is a 65 y.o. female who presented to the [facility] ED on 10/13/2022 complaining of weakness and back pain. She has been suffering from a flare-up of diverticulitis recently with significant diarrhea and abdominal pain. She states she stood up in her bathroom and felt like she was going to pass out and sat down on her backside. She does not feel that she fell or had a significant impact but she did experience sudden severe back pain afterwards. She was evaluated in the ED and a CT scan of her abdomen/pelvis was obtained and this revealed findings concerning for diverticulits as well as small bowel inflammation and also demonstrated an L1 compression fracture. She has been admitted to the medical service and I have been asked to provide neurosurgical consultation regarding the compression fracture. On questioning, she continues to endorse severe back pain with movement. She denies any lower extremity radicular pain, focal weakness or sensory loss. She denies loss of bowel or bladder control. Of note, she underwent a DEXA scan in 7/2022 that revealed osteopenia with a T-score of -1.2.        Objective:    BP (!) 148/66   Pulse 62   Temp 97.3 °F (36.3 °C) (Temporal)   Resp 17   Ht 5' 4\" (1.626 m)   Wt 185 lb (83.9 kg)   SpO2 90%   BMI 31.76 kg/m²       Intake/Output Summary (Last 24 hours) at 10/16/2022 1113  Last data filed at 10/16/2022 0954  Gross per 24 hour   Intake 640 ml   Output 1000 ml   Net -360 ml             Physical Exam:    General: alert, cooperative, no distress  Cardiorespiratory: unlabored breathing  Abdomen: soft and nondistended      Neurologic Exam:    Mental Status: Alert, oriented, thought content appropriate  Cranial Nerves: PERRL, EOMI, symmetric facies, tongue midline  Motor: Motor exam is symmetrical 5 out of 5 all extremities bilaterally  Somatosensory: normal light touch sensation            Pertinent Labs:  Lab Results   Component Value Date    WBC 7.6 10/16/2022    HGB 11.3 (L) 10/16/2022    HCT 34.2 (L) 10/16/2022    MCV 87.0 10/16/2022     10/16/2022     Lab Results   Component Value Date/Time     10/16/2022 03:28 AM    K 4.2 10/16/2022 03:28 AM    CL 96 10/16/2022 03:28 AM    CO2 29 10/16/2022 03:28 AM    BUN 9 10/16/2022 03:28 AM    CREATININE 0.8 10/16/2022 03:28 AM    GLUCOSE 104 10/16/2022 03:28 AM    CALCIUM 9.0 10/16/2022 03:28 AM              Assessment and Plan: This is a 65 y.o. female who has sustained an L1 compression fracture after a near-syncopal episode and controlled fall. She is also suffering from a flare up of diverticulitis. She has severe back pain but is neurologically intact. Her fracture does not appear to be unstable.       -Continue to mobilize with PT/OT in LSO brace  -Obtain upright x-rays today  -Pain control  -If pain cannot be controlled or she is unable to mobilize, she would be a candidate for kyphoplasty  -Will continue to follow along in consultation    Electronically signed by Elbert Cheney MD on 10/16/2022 at 11:13 AM

## 2022-10-17 ENCOUNTER — ANESTHESIA (OUTPATIENT)
Dept: OPERATING ROOM | Age: 77
DRG: 516 | End: 2022-10-17
Payer: MEDICARE

## 2022-10-17 ENCOUNTER — APPOINTMENT (OUTPATIENT)
Dept: GENERAL RADIOLOGY | Age: 77
DRG: 516 | End: 2022-10-17
Payer: MEDICARE

## 2022-10-17 ENCOUNTER — ANESTHESIA EVENT (OUTPATIENT)
Dept: OPERATING ROOM | Age: 77
DRG: 516 | End: 2022-10-17
Payer: MEDICARE

## 2022-10-17 PROBLEM — S32.010A CLOSED COMPRESSION FRACTURE OF BODY OF L1 VERTEBRA (HCC): Status: ACTIVE | Noted: 2022-10-13

## 2022-10-17 PROCEDURE — 0QU03JZ SUPPLEMENT LUMBAR VERTEBRA WITH SYNTHETIC SUBSTITUTE, PERCUTANEOUS APPROACH: ICD-10-PCS | Performed by: NEUROLOGICAL SURGERY

## 2022-10-17 PROCEDURE — 2580000003 HC RX 258: Performed by: STUDENT IN AN ORGANIZED HEALTH CARE EDUCATION/TRAINING PROGRAM

## 2022-10-17 PROCEDURE — 2580000003 HC RX 258: Performed by: NEUROLOGICAL SURGERY

## 2022-10-17 PROCEDURE — 6360000002 HC RX W HCPCS: Performed by: STUDENT IN AN ORGANIZED HEALTH CARE EDUCATION/TRAINING PROGRAM

## 2022-10-17 PROCEDURE — 97530 THERAPEUTIC ACTIVITIES: CPT

## 2022-10-17 PROCEDURE — 3209999900 FLUORO FOR SURGICAL PROCEDURES

## 2022-10-17 PROCEDURE — 6360000002 HC RX W HCPCS: Performed by: NEUROLOGICAL SURGERY

## 2022-10-17 PROCEDURE — 2580000003 HC RX 258: Performed by: NURSE ANESTHETIST, CERTIFIED REGISTERED

## 2022-10-17 PROCEDURE — 97116 GAIT TRAINING THERAPY: CPT

## 2022-10-17 PROCEDURE — 72100 X-RAY EXAM L-S SPINE 2/3 VWS: CPT

## 2022-10-17 PROCEDURE — 6360000002 HC RX W HCPCS: Performed by: NURSE ANESTHETIST, CERTIFIED REGISTERED

## 2022-10-17 PROCEDURE — 3600000014 HC SURGERY LEVEL 4 ADDTL 15MIN: Performed by: NEUROLOGICAL SURGERY

## 2022-10-17 PROCEDURE — 97535 SELF CARE MNGMENT TRAINING: CPT

## 2022-10-17 PROCEDURE — 3700000001 HC ADD 15 MINUTES (ANESTHESIA): Performed by: NEUROLOGICAL SURGERY

## 2022-10-17 PROCEDURE — 6360000002 HC RX W HCPCS: Performed by: ANESTHESIOLOGY

## 2022-10-17 PROCEDURE — 3600000004 HC SURGERY LEVEL 4 BASE: Performed by: NEUROLOGICAL SURGERY

## 2022-10-17 PROCEDURE — 6370000000 HC RX 637 (ALT 250 FOR IP): Performed by: NURSE PRACTITIONER

## 2022-10-17 PROCEDURE — 0QS03ZZ REPOSITION LUMBAR VERTEBRA, PERCUTANEOUS APPROACH: ICD-10-PCS | Performed by: NEUROLOGICAL SURGERY

## 2022-10-17 PROCEDURE — 6370000000 HC RX 637 (ALT 250 FOR IP): Performed by: STUDENT IN AN ORGANIZED HEALTH CARE EDUCATION/TRAINING PROGRAM

## 2022-10-17 PROCEDURE — C1713 ANCHOR/SCREW BN/BN,TIS/BN: HCPCS | Performed by: NEUROLOGICAL SURGERY

## 2022-10-17 PROCEDURE — 2500000003 HC RX 250 WO HCPCS: Performed by: NEUROLOGICAL SURGERY

## 2022-10-17 PROCEDURE — 3700000000 HC ANESTHESIA ATTENDED CARE: Performed by: NEUROLOGICAL SURGERY

## 2022-10-17 PROCEDURE — 6360000002 HC RX W HCPCS: Performed by: NURSE PRACTITIONER

## 2022-10-17 PROCEDURE — 6370000000 HC RX 637 (ALT 250 FOR IP): Performed by: NEUROLOGICAL SURGERY

## 2022-10-17 PROCEDURE — 2580000003 HC RX 258: Performed by: ANESTHESIOLOGY

## 2022-10-17 PROCEDURE — 2709999900 HC NON-CHARGEABLE SUPPLY: Performed by: NEUROLOGICAL SURGERY

## 2022-10-17 PROCEDURE — 1210000000 HC MED SURG R&B

## 2022-10-17 PROCEDURE — 2720000010 HC SURG SUPPLY STERILE: Performed by: NEUROLOGICAL SURGERY

## 2022-10-17 PROCEDURE — 7100000001 HC PACU RECOVERY - ADDTL 15 MIN: Performed by: NEUROLOGICAL SURGERY

## 2022-10-17 PROCEDURE — 7100000000 HC PACU RECOVERY - FIRST 15 MIN: Performed by: NEUROLOGICAL SURGERY

## 2022-10-17 PROCEDURE — 22514 PERQ VERTEBRAL AUGMENTATION: CPT | Performed by: NEUROLOGICAL SURGERY

## 2022-10-17 PROCEDURE — 2500000003 HC RX 250 WO HCPCS: Performed by: NURSE ANESTHETIST, CERTIFIED REGISTERED

## 2022-10-17 PROCEDURE — 99233 SBSQ HOSP IP/OBS HIGH 50: CPT | Performed by: NEUROLOGICAL SURGERY

## 2022-10-17 DEVICE — BONE CEMENT C01B HV-R WITH MIXER  US
Type: IMPLANTABLE DEVICE | Site: BACK | Status: FUNCTIONAL
Brand: KYPHON® HV-R® BONE CEMENT AND KYPHON® MIXER PACK

## 2022-10-17 RX ORDER — SODIUM CHLORIDE, SODIUM LACTATE, POTASSIUM CHLORIDE, CALCIUM CHLORIDE 600; 310; 30; 20 MG/100ML; MG/100ML; MG/100ML; MG/100ML
INJECTION, SOLUTION INTRAVENOUS CONTINUOUS PRN
Status: DISCONTINUED | OUTPATIENT
Start: 2022-10-17 | End: 2022-10-17 | Stop reason: SDUPTHER

## 2022-10-17 RX ORDER — LIDOCAINE HYDROCHLORIDE 20 MG/ML
INJECTION, SOLUTION INFILTRATION; PERINEURAL PRN
Status: DISCONTINUED | OUTPATIENT
Start: 2022-10-17 | End: 2022-10-17 | Stop reason: ALTCHOICE

## 2022-10-17 RX ORDER — SODIUM CHLORIDE, SODIUM LACTATE, POTASSIUM CHLORIDE, CALCIUM CHLORIDE 600; 310; 30; 20 MG/100ML; MG/100ML; MG/100ML; MG/100ML
INJECTION, SOLUTION INTRAVENOUS CONTINUOUS
Status: DISCONTINUED | OUTPATIENT
Start: 2022-10-17 | End: 2022-10-17

## 2022-10-17 RX ORDER — HYDROMORPHONE HYDROCHLORIDE 1 MG/ML
0.5 INJECTION, SOLUTION INTRAMUSCULAR; INTRAVENOUS; SUBCUTANEOUS EVERY 5 MIN PRN
Status: COMPLETED | OUTPATIENT
Start: 2022-10-17 | End: 2022-10-17

## 2022-10-17 RX ORDER — MIDAZOLAM HYDROCHLORIDE 2 MG/2ML
2 INJECTION, SOLUTION INTRAMUSCULAR; INTRAVENOUS
Status: DISCONTINUED | OUTPATIENT
Start: 2022-10-17 | End: 2022-10-17 | Stop reason: HOSPADM

## 2022-10-17 RX ORDER — HYDROMORPHONE HYDROCHLORIDE 1 MG/ML
0.25 INJECTION, SOLUTION INTRAMUSCULAR; INTRAVENOUS; SUBCUTANEOUS
Status: DISCONTINUED | OUTPATIENT
Start: 2022-10-17 | End: 2022-10-19 | Stop reason: HOSPADM

## 2022-10-17 RX ORDER — ONDANSETRON 2 MG/ML
INJECTION INTRAMUSCULAR; INTRAVENOUS PRN
Status: DISCONTINUED | OUTPATIENT
Start: 2022-10-17 | End: 2022-10-17 | Stop reason: SDUPTHER

## 2022-10-17 RX ORDER — HYDROMORPHONE HYDROCHLORIDE 1 MG/ML
0.5 INJECTION, SOLUTION INTRAMUSCULAR; INTRAVENOUS; SUBCUTANEOUS
Status: DISCONTINUED | OUTPATIENT
Start: 2022-10-17 | End: 2022-10-19 | Stop reason: HOSPADM

## 2022-10-17 RX ORDER — DIPHENHYDRAMINE HYDROCHLORIDE 50 MG/ML
12.5 INJECTION INTRAMUSCULAR; INTRAVENOUS
Status: DISCONTINUED | OUTPATIENT
Start: 2022-10-17 | End: 2022-10-17 | Stop reason: HOSPADM

## 2022-10-17 RX ORDER — LIDOCAINE HYDROCHLORIDE 10 MG/ML
1 INJECTION, SOLUTION EPIDURAL; INFILTRATION; INTRACAUDAL; PERINEURAL
Status: DISCONTINUED | OUTPATIENT
Start: 2022-10-17 | End: 2022-10-17 | Stop reason: HOSPADM

## 2022-10-17 RX ORDER — PROPOFOL 10 MG/ML
INJECTION, EMULSION INTRAVENOUS PRN
Status: DISCONTINUED | OUTPATIENT
Start: 2022-10-17 | End: 2022-10-17 | Stop reason: SDUPTHER

## 2022-10-17 RX ORDER — FAMOTIDINE 20 MG/1
20 TABLET, FILM COATED ORAL ONCE
Status: DISCONTINUED | OUTPATIENT
Start: 2022-10-17 | End: 2022-10-17 | Stop reason: HOSPADM

## 2022-10-17 RX ORDER — METOCLOPRAMIDE HYDROCHLORIDE 5 MG/ML
10 INJECTION INTRAMUSCULAR; INTRAVENOUS
Status: DISCONTINUED | OUTPATIENT
Start: 2022-10-17 | End: 2022-10-17 | Stop reason: HOSPADM

## 2022-10-17 RX ORDER — MEPERIDINE HYDROCHLORIDE 25 MG/ML
12.5 INJECTION INTRAMUSCULAR; INTRAVENOUS; SUBCUTANEOUS EVERY 5 MIN PRN
Status: DISCONTINUED | OUTPATIENT
Start: 2022-10-17 | End: 2022-10-17 | Stop reason: HOSPADM

## 2022-10-17 RX ORDER — FENTANYL CITRATE 50 UG/ML
INJECTION, SOLUTION INTRAMUSCULAR; INTRAVENOUS PRN
Status: DISCONTINUED | OUTPATIENT
Start: 2022-10-17 | End: 2022-10-17 | Stop reason: SDUPTHER

## 2022-10-17 RX ORDER — HYDROCODONE BITARTRATE AND ACETAMINOPHEN 5; 325 MG/1; MG/1
1 TABLET ORAL EVERY 4 HOURS PRN
Status: DISCONTINUED | OUTPATIENT
Start: 2022-10-17 | End: 2022-10-19 | Stop reason: HOSPADM

## 2022-10-17 RX ORDER — EPHEDRINE SULFATE 50 MG/ML
INJECTION, SOLUTION INTRAVENOUS PRN
Status: DISCONTINUED | OUTPATIENT
Start: 2022-10-17 | End: 2022-10-17 | Stop reason: SDUPTHER

## 2022-10-17 RX ORDER — HYDROMORPHONE HYDROCHLORIDE 1 MG/ML
0.25 INJECTION, SOLUTION INTRAMUSCULAR; INTRAVENOUS; SUBCUTANEOUS EVERY 5 MIN PRN
Status: COMPLETED | OUTPATIENT
Start: 2022-10-17 | End: 2022-10-17

## 2022-10-17 RX ORDER — DEXAMETHASONE SODIUM PHOSPHATE 10 MG/ML
INJECTION, SOLUTION INTRAMUSCULAR; INTRAVENOUS PRN
Status: DISCONTINUED | OUTPATIENT
Start: 2022-10-17 | End: 2022-10-17 | Stop reason: SDUPTHER

## 2022-10-17 RX ORDER — LIDOCAINE HYDROCHLORIDE 10 MG/ML
INJECTION, SOLUTION INFILTRATION; PERINEURAL PRN
Status: DISCONTINUED | OUTPATIENT
Start: 2022-10-17 | End: 2022-10-17 | Stop reason: SDUPTHER

## 2022-10-17 RX ORDER — BUPIVACAINE HYDROCHLORIDE AND EPINEPHRINE 5; 5 MG/ML; UG/ML
INJECTION, SOLUTION PERINEURAL PRN
Status: DISCONTINUED | OUTPATIENT
Start: 2022-10-17 | End: 2022-10-17 | Stop reason: ALTCHOICE

## 2022-10-17 RX ORDER — SCOLOPAMINE TRANSDERMAL SYSTEM 1 MG/1
1 PATCH, EXTENDED RELEASE TRANSDERMAL
Status: DISCONTINUED | OUTPATIENT
Start: 2022-10-17 | End: 2022-10-17 | Stop reason: HOSPADM

## 2022-10-17 RX ORDER — ROCURONIUM BROMIDE 10 MG/ML
INJECTION, SOLUTION INTRAVENOUS PRN
Status: DISCONTINUED | OUTPATIENT
Start: 2022-10-17 | End: 2022-10-17 | Stop reason: SDUPTHER

## 2022-10-17 RX ORDER — HYDROCODONE BITARTRATE AND ACETAMINOPHEN 5; 325 MG/1; MG/1
2 TABLET ORAL EVERY 4 HOURS PRN
Status: DISCONTINUED | OUTPATIENT
Start: 2022-10-17 | End: 2022-10-19 | Stop reason: HOSPADM

## 2022-10-17 RX ADMIN — SODIUM CHLORIDE, SODIUM LACTATE, POTASSIUM CHLORIDE, AND CALCIUM CHLORIDE: 600; 310; 30; 20 INJECTION, SOLUTION INTRAVENOUS at 17:13

## 2022-10-17 RX ADMIN — LIDOCAINE HYDROCHLORIDE 50 MG: 10 INJECTION, SOLUTION INFILTRATION; PERINEURAL at 16:07

## 2022-10-17 RX ADMIN — HYDROCODONE BITARTRATE AND ACETAMINOPHEN 2 TABLET: 5; 325 TABLET ORAL at 21:47

## 2022-10-17 RX ADMIN — EPHEDRINE SULFATE 10 MG: 50 INJECTION INTRAMUSCULAR; INTRAVENOUS; SUBCUTANEOUS at 16:29

## 2022-10-17 RX ADMIN — SUGAMMADEX 200 MG: 100 INJECTION, SOLUTION INTRAVENOUS at 17:12

## 2022-10-17 RX ADMIN — HYDROMORPHONE HYDROCHLORIDE 0.5 MG: 1 INJECTION, SOLUTION INTRAMUSCULAR; INTRAVENOUS; SUBCUTANEOUS at 18:00

## 2022-10-17 RX ADMIN — CEFAZOLIN 2000 MG: 2 INJECTION, POWDER, FOR SOLUTION INTRAMUSCULAR; INTRAVENOUS at 16:18

## 2022-10-17 RX ADMIN — HYDROCODONE BITARTRATE AND ACETAMINOPHEN 1 TABLET: 5; 325 TABLET ORAL at 05:17

## 2022-10-17 RX ADMIN — HYDROMORPHONE HYDROCHLORIDE 0.5 MG: 1 INJECTION, SOLUTION INTRAMUSCULAR; INTRAVENOUS; SUBCUTANEOUS at 17:41

## 2022-10-17 RX ADMIN — DEXAMETHASONE SODIUM PHOSPHATE 4 MG: 10 INJECTION, SOLUTION INTRAMUSCULAR; INTRAVENOUS at 16:33

## 2022-10-17 RX ADMIN — HYDROMORPHONE HYDROCHLORIDE 0.5 MG: 1 INJECTION, SOLUTION INTRAMUSCULAR; INTRAVENOUS; SUBCUTANEOUS at 13:55

## 2022-10-17 RX ADMIN — HYDROCODONE BITARTRATE AND ACETAMINOPHEN 1 TABLET: 5; 325 TABLET ORAL at 00:56

## 2022-10-17 RX ADMIN — HYDROCODONE BITARTRATE AND ACETAMINOPHEN 1 TABLET: 5; 325 TABLET ORAL at 10:49

## 2022-10-17 RX ADMIN — HYDROMORPHONE HYDROCHLORIDE 0.5 MG: 1 INJECTION, SOLUTION INTRAMUSCULAR; INTRAVENOUS; SUBCUTANEOUS at 18:08

## 2022-10-17 RX ADMIN — SODIUM CHLORIDE, SODIUM LACTATE, POTASSIUM CHLORIDE, AND CALCIUM CHLORIDE: 600; 310; 30; 20 INJECTION, SOLUTION INTRAVENOUS at 07:54

## 2022-10-17 RX ADMIN — LEVOTHYROXINE SODIUM 75 MCG: 75 TABLET ORAL at 05:17

## 2022-10-17 RX ADMIN — HYDROMORPHONE HYDROCHLORIDE 0.5 MG: 1 INJECTION, SOLUTION INTRAMUSCULAR; INTRAVENOUS; SUBCUTANEOUS at 19:34

## 2022-10-17 RX ADMIN — PIPERACILLIN AND TAZOBACTAM 3375 MG: 3; .375 INJECTION, POWDER, LYOPHILIZED, FOR SOLUTION INTRAVENOUS at 19:34

## 2022-10-17 RX ADMIN — PIPERACILLIN AND TAZOBACTAM 3375 MG: 3; .375 INJECTION, POWDER, LYOPHILIZED, FOR SOLUTION INTRAVENOUS at 03:00

## 2022-10-17 RX ADMIN — PANTOPRAZOLE SODIUM 40 MG: 40 TABLET, DELAYED RELEASE ORAL at 05:17

## 2022-10-17 RX ADMIN — PROPOFOL 150 MG: 10 INJECTION, EMULSION INTRAVENOUS at 16:08

## 2022-10-17 RX ADMIN — HYDROMORPHONE HYDROCHLORIDE 0.5 MG: 1 INJECTION, SOLUTION INTRAMUSCULAR; INTRAVENOUS; SUBCUTANEOUS at 08:50

## 2022-10-17 RX ADMIN — FENTANYL CITRATE 100 MCG: 50 INJECTION, SOLUTION INTRAMUSCULAR; INTRAVENOUS at 16:07

## 2022-10-17 RX ADMIN — SODIUM CHLORIDE, SODIUM LACTATE, POTASSIUM CHLORIDE, AND CALCIUM CHLORIDE: 600; 310; 30; 20 INJECTION, SOLUTION INTRAVENOUS at 16:03

## 2022-10-17 RX ADMIN — HYDROMORPHONE HYDROCHLORIDE 0.5 MG: 1 INJECTION, SOLUTION INTRAMUSCULAR; INTRAVENOUS; SUBCUTANEOUS at 17:48

## 2022-10-17 RX ADMIN — ROCURONIUM BROMIDE 50 MG: 10 INJECTION, SOLUTION INTRAVENOUS at 16:09

## 2022-10-17 RX ADMIN — ONDANSETRON 4 MG: 2 INJECTION INTRAMUSCULAR; INTRAVENOUS at 17:06

## 2022-10-17 RX ADMIN — PIPERACILLIN AND TAZOBACTAM 3375 MG: 3; .375 INJECTION, POWDER, LYOPHILIZED, FOR SOLUTION INTRAVENOUS at 10:52

## 2022-10-17 RX ADMIN — SODIUM CHLORIDE, SODIUM LACTATE, POTASSIUM CHLORIDE, AND CALCIUM CHLORIDE: 600; 310; 30; 20 INJECTION, SOLUTION INTRAVENOUS at 15:57

## 2022-10-17 RX ADMIN — TIZANIDINE 4 MG: 4 TABLET ORAL at 10:49

## 2022-10-17 ASSESSMENT — PAIN SCALES - GENERAL
PAINLEVEL_OUTOF10: 10
PAINLEVEL_OUTOF10: 3
PAINLEVEL_OUTOF10: 10
PAINLEVEL_OUTOF10: 2
PAINLEVEL_OUTOF10: 4
PAINLEVEL_OUTOF10: 5
PAINLEVEL_OUTOF10: 10
PAINLEVEL_OUTOF10: 3
PAINLEVEL_OUTOF10: 8
PAINLEVEL_OUTOF10: 10
PAINLEVEL_OUTOF10: 8

## 2022-10-17 ASSESSMENT — PAIN - FUNCTIONAL ASSESSMENT
PAIN_FUNCTIONAL_ASSESSMENT: PREVENTS OR INTERFERES SOME ACTIVE ACTIVITIES AND ADLS
PAIN_FUNCTIONAL_ASSESSMENT: PREVENTS OR INTERFERES WITH MANY ACTIVE NOT PASSIVE ACTIVITIES

## 2022-10-17 ASSESSMENT — PAIN DESCRIPTION - LOCATION
LOCATION: BACK

## 2022-10-17 ASSESSMENT — PAIN DESCRIPTION - ORIENTATION
ORIENTATION: MID
ORIENTATION: LOWER
ORIENTATION: MID

## 2022-10-17 ASSESSMENT — PAIN DESCRIPTION - ONSET
ONSET: ON-GOING

## 2022-10-17 ASSESSMENT — PAIN DESCRIPTION - DESCRIPTORS
DESCRIPTORS: SHARP
DESCRIPTORS: ACHING
DESCRIPTORS: ACHING
DESCRIPTORS: SHARP
DESCRIPTORS: ACHING
DESCRIPTORS: SHARP
DESCRIPTORS: ACHING;DISCOMFORT

## 2022-10-17 ASSESSMENT — ENCOUNTER SYMPTOMS
DIARRHEA: 0
WHEEZING: 0
NAUSEA: 0
SHORTNESS OF BREATH: 0
CHEST TIGHTNESS: 0
COUGH: 0
ABDOMINAL PAIN: 1
CONSTIPATION: 0
SHORTNESS OF BREATH: 0

## 2022-10-17 ASSESSMENT — PAIN DESCRIPTION - FREQUENCY
FREQUENCY: INTERMITTENT

## 2022-10-17 ASSESSMENT — PAIN DESCRIPTION - DIRECTION
RADIATING_TOWARDS: NO
RADIATING_TOWARDS: NO

## 2022-10-17 ASSESSMENT — PAIN DESCRIPTION - PAIN TYPE
TYPE: ACUTE PAIN
TYPE: SURGICAL PAIN

## 2022-10-17 ASSESSMENT — LIFESTYLE VARIABLES: SMOKING_STATUS: 0

## 2022-10-17 NOTE — PROGRESS NOTES
Occupational Therapy     10/17/22 1500   Subjective   Subjective Pt in bed upon arrival for therapy. Pt reluctant to participate due to back pain but wants to try to use the bathroom. Pain Assessment   Pain Assessment 0-10   Pain Level 5   Pain Location Back   Pain Orientation Mid   Pain Descriptors Aching;Discomfort   Functional Pain Assessment Prevents or interferes some active activities and ADLs   Pain Type Acute pain   Pain Frequency Intermittent   Pain Onset On-going   Non-Pharmaceutical Pain Intervention(s) Ambulation/Increased Activity;Repositioned; Rest   Response to Pain Intervention Patient satisfied   Cognition   Overall Cognitive Status WNL   Orientation   Overall Orientation Status WNL   Bed Mobility Training   Bed Mobility Training Yes   Overall Level of Assistance Minimum assistance;Contact-guard assistance   Interventions Verbal cues; Tactile cues;Manual cues   Rolling Stand-by assistance   Supine to Sit Minimum assistance;Contact-guard assistance   Sit to Supine Minimum assistance;Contact-guard assistance   Scooting Contact-guard assistance   Transfer Training   Transfer Training Yes   Overall Level of Assistance Contact-guard assistance   Interventions Verbal cues; Tactile cues   Sit to Stand Contact-guard assistance   Stand to Sit Contact-guard assistance   Toilet Transfer Contact-guard assistance   Balance   Sitting Intact   Standing With support  (RW)   ADL   Feeding Independent   Grooming Independent;Setup   Grooming Skilled Clinical Factors CGA standing at sink. UE Bathing Minimal assistance   LE Bathing Moderate assistance   UE Dressing Supervision;Minimal assistance   LE Dressing Moderate assistance   Toileting Minimal assistance;Contact guard assistance   Additional Comments Pt has difficulty bending forward and may benefit from AE to perform LB bathing/ dressing. Pt able to don LSO with MIN-SBA. Assessment   Assessment Tx focused on bed mobility with focus on back precautions. Pt instructed on functional mobility at RW level to/ from the bathroom. Pt instructed on toileting task and h/g task in standing at RW level.    Activity Tolerance Patient tolerated treatment well   Discharge Recommendations Patient would benefit from continued therapy after discharge   Occupational Therapy Plan   Times Per Week 3-5   Times Per Day Once a day   OT Plan of Care   Monday X   Electronically signed by MARTÍNEZ Greer on 10/17/2022 at 3:39 PM

## 2022-10-17 NOTE — ANESTHESIA PRE PROCEDURE
mcg at 10/17/22 0517    pantoprazole (PROTONIX) tablet 40 mg  40 mg Oral QAM AC Kriss Dixon MD   40 mg at 10/17/22 0517    atorvastatin (LIPITOR) tablet 20 mg  20 mg Oral Daily Kriss Dixon MD   20 mg at 10/16/22 0859    sodium chloride flush 0.9 % injection 5-40 mL  5-40 mL IntraVENous 2 times per day Kriss Dixon MD   10 mL at 10/16/22 0900    sodium chloride flush 0.9 % injection 5-40 mL  5-40 mL IntraVENous PRN Kriss Dixon MD        0.9 % sodium chloride infusion   IntraVENous PRN Kriss Dixon MD        polyethylene glycol [facility]) packet 17 g  17 g Oral Daily PRN Kriss Dixon MD        acetaminophen (TYLENOL) tablet 650 mg  650 mg Oral Q6H PRN Kriss Dixon MD        Or    acetaminophen (TYLENOL) suppository 650 mg  650 mg Rectal Q6H PRN Kriss Dixon MD        piperacillin-tazobactam (ZOSYN) 3,375 mg in dextrose 5 % 50 mL IVPB ([id number])  3,375 mg IntraVENous Kaela Sandoval MD 12.5 mL/hr at 10/17/22 1052 3,375 mg at 10/17/22 1052    HYDROmorphone HCl PF (DILAUDID) injection 0.5 mg  0.5 mg IntraVENous Q3H PRN Michaell Sink, APRN - CNP   0.5 mg at 10/17/22 1355    HYDROcodone-acetaminophen (NORCO) 5-325 MG per tablet 1 tablet  1 tablet Oral Q4H PRN Michaell Sink, APRN - CNP   1 tablet at 10/17/22 1049       Allergies:     Allergies   Allergen Reactions    Ciprofloxacin Other (See Comments)     Kidney failure    Morphine      Very Restless       Problem List:    Patient Active Problem List   Diagnosis Code    Irritable bowel syndrome with diarrhea K58.0    Diverticulosis K57.90    History of adenomatous polyp of colon Z86.010    Primary osteoarthritis of left knee M17.12    Essential hypertension I10    Hypothyroidism E03.9    Hyperlipidemia E78.5    Gastroesophageal reflux disease K21.9    Diverticulitis K57.92    Insomnia G47.00    History of colonic diverticulitis Z87.19    Lipoma of colon D17.5    Closed compression fracture of body of L1 vertebra ([facility] S32.010A       Past Medical History:        Diagnosis Date    Acute renal failure ([facility] 07/29/2016    discharged 8/1/16; \"due to strong antibiotic and being dehydrated\"    Arthritis     Breast cancer ([facility] [address]     surgery only    [facility]     Breast cancer    Colon polyps     Diverticulitis     GERD (gastroesophageal reflux disease)     Hyperlipidemia     Hypertension     Joint pain     Thyroid condition        Past Surgical History:        Procedure Laterality Date    ABDOMEN SURGERY      BLADDER REPAIR      BLEPHAROPLASTY      BREAST ENHANCEMENT SURGERY      BUNIONECTOMY      CATARACT REMOVAL Bilateral     COLONOSCOPY  03/10/2009    Dr Mike Alfaro, [place], lipoma in the proximal ascending colon, 5 yr recall    COLONOSCOPY  04/29/2014    Dr Marito Rg x 2, [place], 5 yr recall    COLONOSCOPY N/A 05/21/2019    Dr Silas Church-Questionable lipoma, diverticular disease-Tubular AP (-) dysplasia, 5 yr recall    HYSTERECTOMY (CERVIX STATUS UNKNOWN)      KNEE SURGERY Left 08/17/2016    MASTECTOMY Bilateral 1984    OVARY REMOVAL      PARTIAL HYSTERECTOMY (CERVIX NOT REMOVED)      PATELLA SURGERY      THYROIDECTOMY, PARTIAL      TONSILLECTOMY AND ADENOIDECTOMY      TOTAL KNEE ARTHROPLASTY Left 08/17/2016    KNEE TOTAL ARTHROPLASTY performed by Edvin Lopez MD at [address] [place] [address] N/A 08/27/2020    Dr Silas Church-w/zzn-85Z-Nhdsylgme obstructing ring in GEJ, severe esophagitis, gastritis, (+) Marie's, indeterminate for dysplasia--1st dx, Repeat in 3 months    UPPER GASTROINTESTINAL ENDOSCOPY N/A 11/12/2020    Dr Silas Church-w/wxc-25H-Zxandbjoipxa ring at the GEJ-Well healed esophagitis w/Schatzkis ring (+) Marie's, 1 yr recall    UPPER GASTROINTESTINAL ENDOSCOPY N/A 11/29/2021    Dr Silas Church-Gastritis, no Marie's, no h pylori, 3 yr recall       Social History:    Social History     Tobacco Use    Smoking status: Never    Smokeless tobacco: Never   Substance Use Topics    Alcohol use: No                                Counseling given: Not Answered      Vital Signs (Current):   Vitals:    10/17/22 0430 10/17/22 0517 10/17/22 0547 10/17/22 0752   BP: (!) 159/61   (!) 172/65   Pulse: (!) 49   (!) 46   Resp: 16 16 16 18   Temp: 97.3 °F (36.3 °C)   97.2 °F (36.2 °C)   TempSrc: Temporal   Temporal   SpO2: 91%   93%   Weight:       Height:                                                  BP Readings from Last 3 Encounters:   10/17/22 (!) 172/65   10/10/22 120/66   11/29/21 (!) 149/79       NPO Status:                                                                                 BMI:   Wt Readings from Last 3 Encounters:   10/13/22 185 lb (83.9 kg)   10/10/22 190 lb (86.2 kg)   11/29/21 180 lb (81.6 kg)     Body mass index is 31.76 kg/m². CBC:   Lab Results   Component Value Date/Time    WBC 7.6 10/16/2022 03:28 AM    RBC 3.93 10/16/2022 03:28 AM    HGB 11.3 10/16/2022 03:28 AM    HCT 34.2 10/16/2022 03:28 AM    MCV 87.0 10/16/2022 03:28 AM    RDW 13.3 10/16/2022 03:28 AM     10/16/2022 03:28 AM       CMP:   Lab Results   Component Value Date/Time     10/16/2022 03:28 AM    K 4.2 10/16/2022 03:28 AM    CL 96 10/16/2022 03:28 AM    CO2 29 10/16/2022 03:28 AM    BUN 9 10/16/2022 03:28 AM    CREATININE 0.8 10/16/2022 03:28 AM    GFRAA >59 10/16/2022 03:28 AM    LABGLOM >60 10/16/2022 03:28 AM    GLUCOSE 104 10/16/2022 03:28 AM    PROT 7.2 10/13/2022 09:00 AM    CALCIUM 9.0 10/16/2022 03:28 AM    BILITOT 0.3 10/13/2022 09:00 AM    ALKPHOS 72 10/13/2022 09:00 AM    AST 24 10/13/2022 09:00 AM    ALT 13 10/13/2022 09:00 AM       POC Tests: No results for input(s): POCGLU, POCNA, POCK, POCCL, POCBUN, POCHEMO, POCHCT in the last 72 hours.     Coags:   Lab Results   Component Value Date/Time    PROTIME 14.1 08/01/2016 11:53 AM    INR 1.09 08/01/2016 11:53 AM    APTT 33.3 08/01/2016 11:53 AM       HCG (If Applicable): No results found for: PREGTESTUR, PREGSERUM, HCG, HCGQUANT     ABGs: No results found for: PHART, PO2ART, ZXR4JYU, NKH7IDH, BEART, G0RLEVCQ     Type & Screen (If Applicable):  No results found for: LABABO, LABRH    Drug/Infectious Status (If Applicable):  No results found for: HIV, HEPCAB    COVID-19 Screening (If Applicable):   Lab Results   Component Value Date/Time    COVID19 Not Detected 10/13/2022 09:20 AM    COVID19 Not Detected 11/08/2020 12:04 PM           Anesthesia Evaluation  Patient summary reviewed and Nursing notes reviewed no history of anesthetic complications:   Airway: Mallampati: II  TM distance: >3 FB   Neck ROM: full  Mouth opening: > = 3 FB   Dental: normal exam         Pulmonary:Negative Pulmonary ROS and normal exam  breath sounds clear to auscultation      (-) shortness of breath and not a current smoker          Patient did not smoke on day of surgery. Cardiovascular:    (+) hypertension:, hyperlipidemia    (-) CAD,  angina and  CHF    NYHA Classification: I  ECG reviewed  Rhythm: regular  Rate: normal           Beta Blocker:  Not on Beta Blocker         Neuro/Psych:   Negative Neuro/Psych ROS     (-) seizures, CVA and depression/anxiety            GI/Hepatic/Renal: Neg GI/Hepatic/Renal ROS  (+) GERD:,      (-) hiatal hernia       Endo/Other: Negative Endo/Other ROS   (+) hypothyroidism, blood dyscrasia: anemia, arthritis: OA., . Pt had PAT visit. Abdominal:       Abdomen: soft. Vascular: Other Findings:           Anesthesia Plan      general     ASA 3     (Iv zofran within 30 min of closing )  Induction: intravenous. BIS  MIPS: Postoperative opioids intended and Prophylactic antiemetics administered. Anesthetic plan and risks discussed with patient. Use of blood products discussed with patient whom. Plan discussed with CRNA.     Attending anesthesiologist reviewed and agrees with Pre Eval content                Barbara Maldonado MD   10/17/2022

## 2022-10-17 NOTE — OP NOTE
NEUROSURGERY OPERATIVE REPORT       DATE OF PROCEDURE: 10/17/2022     ATTENDING SURGEON:  Beverly Schulz Cap, MD, PhD     PREOPERATIVE DIAGNOSIS:  L1 compression fracture. POSTOPERATIVE DIAGNOSIS:  L1 compression fracture. PROCEDURE PERFORMED:  1. L1 bipedicular kyphoplasty. 2.  Intraoperative fluoroscopy with interpretation. ESTIMATED BLOOD LOSS:  <10 mL     INDICATIONS:  L1 compression fracture with intractable back pain. PROCEDURE IN DETAIL:  The patient was identified in the preoperative area, consent for surgery was confirmed, and the site for surgery was identified. The patient was  transferred to the operating room by the Anesthesia team where general endotracheal anesthesia was induced without difficulty. They were then flipped into the prone position on a Oliver table and all pressure points were carefully padded and the arms were placed on armboards. Once in position, AP and lateral fluoroscopy was used to identify the L1 level and the fracture was readily apparent. Once we had identified the fracture level and we marked entry points for the pedicles bilaterally on the skin, the back was prepped and draped in the usual aseptic fashion. A team pause was held according to the preformatted script, all were in agreement, and the decision was made to proceed with surgery. Local anesthetic was injected over our planned incision and stabs were made on both sides with a #15 blade. Beginning on the patient's right side, a Jamshidi needle from the [place] set was advanced into the pedicle and into the vertebral body of the fracture under intermittent fluoroscopic guidance. Once we had the needle in place, we decided to placed the needle on the left side by an identical technique. Once we had the cannulas positioned in the vertebral body, beginning on the right side we advanced a balloon from the Kyphon set into the vertebral body and withdrew our cannula slightly.   Once we had the balloon positioned, we inflated the balloon under intermittent fluoroscopic guidance. The pressures remained normal during balloon inflation and there was no evidence of violation of the endplate or walls of the  vertebral body. Once we had the balloon inflated, we repeated the procedure on the left side, again inflating the balloon under nominal pressure conditions without evidence of violation of the endplates. Once we had the balloons inflated, the bone cement was mixed under the 's instructions until it reached the consistency of thick tooth paste. We then withdrew the balloons beginning and the cannula containing the cement was inserted. We instilled cement under intermittent fluoroscopic guidance, confirming there was no extravasation of cement either through the endplates or posteriorly or anteriorly. Once we had cement instilled in the fracture on the right side, we withdrew turned our attention to the left side and repeated the cement injection procedure identically. Once we had completed cement injection, we obtained AP and lateral fluoroscopic images confirming there was no extravasation of cement into the disk spaces or through the back wall of the vertebral body. Once we had the cement in place and we were satisfied with the appearance of our x-rays and our fracture reduction, we removed the needles bilaterally. Once the needles were removed, the incisions were cleaned and dried. The dermis was closed with a single interrupted inverted 3-0 Vicryl stitch and then the stab incisions were covered with Dermabond skin glue. Once the Dermabond had dried, the drapes were removed, the patient was flipped into the supine position and control of the operation was returned to Anesthesia for extubation and transport to recovery. All sponge, needle, and instrument counts were correct at the end of the procedure and there are no apparent complications.            Rick Gan MD, PhD

## 2022-10-17 NOTE — PROGRESS NOTES
NEUROSURGERY PROGRESS NOTE      Chief Complaint:   Chief Complaint   Patient presents with    Back Pain     Pt c/o low back pain after lying in the floor all night. Dizziness     Pt states she got up in the middle of the night to go to the restroom. States she got very dizzy and laid in the floor to prevent passing out or falling. Denies LOC. Interval Update:    Standing X-rays completed yesterday. Patient continues to report severe back pain when turning in bed and attempting to stand. She does not feel she can continue to manage her current level of pain. She continues to deny any radicular pain or weakness. She states she now wishes to proceed with kyphoplasty. HPI:     The patient is a 65 y.o. female who presented to the [facility] ED on 10/13/2022 complaining of weakness and back pain. She has been suffering from a flare-up of diverticulitis recently with significant diarrhea and abdominal pain. She states she stood up in her bathroom and felt like she was going to pass out and sat down on her backside. She does not feel that she fell or had a significant impact but she did experience sudden severe back pain afterwards. She was evaluated in the ED and a CT scan of her abdomen/pelvis was obtained and this revealed findings concerning for diverticulits as well as small bowel inflammation and also demonstrated an L1 compression fracture. She has been admitted to the medical service and I have been asked to provide neurosurgical consultation regarding the compression fracture. On questioning, she continues to endorse severe back pain with movement. She denies any lower extremity radicular pain, focal weakness or sensory loss. She denies loss of bowel or bladder control. Of note, she underwent a DEXA scan in 7/2022 that revealed osteopenia with a T-score of -1.2.        Objective:    BP (!) 172/65   Pulse (!) 46   Temp 97.2 °F (36.2 °C) (Temporal)   Resp 18   Ht 5' 4\" (1.626 m) Wt 185 lb (83.9 kg)   SpO2 93%   BMI 31.76 kg/m²       Intake/Output Summary (Last 24 hours) at 10/17/2022 4407  Last data filed at 10/17/2022 0430  Gross per 24 hour   Intake 2479 ml   Output --   Net 2479 ml             Physical Exam:    General: alert, cooperative, no distress  Cardiorespiratory: unlabored breathing  Abdomen: soft and nondistended      Neurologic Exam:    Mental Status: Alert, oriented, thought content appropriate  Cranial Nerves: PERRL, EOMI, symmetric facies, tongue midline  Motor: Motor exam is symmetrical 5 out of 5 all extremities bilaterally  Somatosensory: normal light touch sensation            Pertinent Labs:  Lab Results   Component Value Date    WBC 7.6 10/16/2022    HGB 11.3 (L) 10/16/2022    HCT 34.2 (L) 10/16/2022    MCV 87.0 10/16/2022     10/16/2022     Lab Results   Component Value Date/Time     10/16/2022 03:28 AM    K 4.2 10/16/2022 03:28 AM    CL 96 10/16/2022 03:28 AM    CO2 29 10/16/2022 03:28 AM    BUN 9 10/16/2022 03:28 AM    CREATININE 0.8 10/16/2022 03:28 AM    GLUCOSE 104 10/16/2022 03:28 AM    CALCIUM 9.0 10/16/2022 03:28 AM              Assessment and Plan: This is a 65 y.o. female who has sustained an L1 compression fracture after a near-syncopal episode and controlled fall. She is also suffering from a flare up of diverticulitis. She has severe back pain but is neurologically intact. Her fracture does not appear to be unstable on x-rays, but she has intractable pain that is severely limiting her ability to mobilize. I have offered her an L1 kyphoplasty. The procedure was explained to her in detail. Specific risks of bleeding, infection, neurologic injury/paralysis, CSF leak, cement extravasation/embolization, incomplete pain relief, cardiopulmonary complications and death were all discussed. She voiced understanding and requested that we proceed with surgery.     -OR today for L1 kyphoplasty  -Continue PT/OT in LSO brace postop  -Disposition will depend on pain control and mobility after surgery    Electronically signed by Carley Rosales MD on 10/17/2022 at 8:14 AM

## 2022-10-17 NOTE — PROGRESS NOTES
Daily Progress Note  Estela Hendrickson  MRN: [id number] LOS: 4    Admit Date: 10/13/2022   10/17/2022 8:08 AM    Subjective: Interval History:    Reviewed overnight events and nursing notes. C/o back pain this morning. Has not had significant diarrhea since admission. Had 1 episode of abdominal cramping yesterday but it spontaneously resolved. Review of Systems   Constitutional:  Negative for chills and fever. Respiratory:  Negative for cough, chest tightness, shortness of breath and wheezing. Cardiovascular:  Negative for chest pain, palpitations and leg swelling. Gastrointestinal:  Positive for abdominal pain. Negative for constipation, diarrhea and nausea. DIET:  Diet NPO Exceptions are: Sips of Water with Meds    Medications:      sodium chloride      lactated ringers 100 mL/hr at 10/17/22 0754      calcium-cholecalciferol  1 tablet Oral Daily    lidocaine  1 patch TransDERmal Daily    allopurinol  200 mg Oral Daily    levothyroxine  75 mcg Oral Daily    pantoprazole  40 mg Oral QAM AC    atorvastatin  20 mg Oral Daily    sodium chloride flush  5-40 mL IntraVENous 2 times per day    piperacillin-tazobactam  3,375 mg IntraVENous Q8H         Objective:     Vitals: BP (!) 172/65   Pulse (!) 46   Temp 97.2 °F (36.2 °C) (Temporal)   Resp 18   Ht 5' 4\" (1.626 m)   Wt 185 lb (83.9 kg)   SpO2 93%   BMI 31.76 kg/m²    Intake/Output Summary (Last 24 hours) at 10/17/2022 0808  Last data filed at 10/17/2022 0430  Gross per 24 hour   Intake 2479 ml   Output --   Net 2479 ml    Temp (24hrs), Av.1 °F (36.2 °C), Min:96.6 °F (35.9 °C), Max:97.3 °F (36.3 °C)    Glucose:  Lab Results   Component Value Date    POCGLU 109 (H) 2018     Physical Examination:   Objective:  General Appearance:  Comfortable and well-appearing. Vital signs: (most recent): Blood pressure (!) 172/65, pulse (!) 46, temperature 97.2 °F (36.2 °C), temperature source Temporal, resp.  rate 18, height 5' 4\" (1.626 m), weight 185 lb (83.9 kg), SpO2 93 %. No fever. Lungs:  Normal effort and normal respiratory rate. Breath sounds clear to auscultation. She is not in respiratory distress. Heart: Normal rate. Regular rhythm. No murmur. Abdomen: Abdomen is soft and non-distended. Bowel sounds are normal.   There is no abdominal tenderness. Labs:  No results found for: CHEMIS, CBC     Imaging:  XR LUMBAR SPINE (2-3 VIEWS)  Narrative: Exam:X-RAYS OF THE LUMBAR SPINE   Clinical data: Checking stability in brace from compression fracture L1  Technique: Four views of the lumbar spine. Prior studies:  CT of the abdomen and pelvis dated 10/13/2022. Author Yumiko Findings:  Anterior wedge compression fracture of the superior L1 endplate with 05% loss of height. Vertebral body heights and disc spaces are otherwise well preserved. Alignment is unremarkable. Minor facet hypertrophy in the lower lumbar spine. Mild curvature of the thoracolumbar spine convex to the right and the lumbar spine convex to the left. Osteopenia. Impression: Anterior wedge compression fracture of the superior L1 endplate with 25% loss of height, not definitively changed as compared to the recent CT. Recommendation:   Follow up as clinically indicated. Electronically Signed by Cody Cornell MD at 16-Oct-2022 02:31:07 PM                    Assessment and Plan:     Primary Problem:  Diverticulitis    Hospital Problem list:  Principal Problem:    Diverticulitis  Active Problems:    Closed compression fracture of body of L1 vertebra (HCC)  Resolved Problems:    * No resolved hospital problems. *    Assessment: 59-year-old female with past medical history of hypertension, hyperlipidemia who presents for diverticulitis with systemic symptoms. Plan:    Acute diverticulitis  Improvement. Continue Zosyn. GI PCR negative. Compression fracture  Neurosurgery consulted and following. Scheduled for kyphoplasty today for pain control.   Will have PT/OT evaluate post operatively, will need placement. Reviewed treatment plans with the patient and/or family.      Code Status: Full Code    Electronically signed by Jessi Coffman MD on 10/17/2022 at 8:08 AM

## 2022-10-17 NOTE — ANESTHESIA POSTPROCEDURE EVALUATION
Department of Anesthesiology  Postprocedure Note    Patient: [place]  MRN: [id number]  [place]: 1945  Date of evaluation: 10/17/2022      Procedure Summary     Date: 10/17/22 Room / Location: [address]    Anesthesia Start: 3087 Anesthesia Stop: 6678    Procedure: L1 kyphoplasty (Bilateral) Diagnosis:       Closed compression fracture of body of L1 vertebra (HCC)      (Closed compression fracture of body of L1 vertebra ([facility] [address] [M30.988Y])    Surgeons: Davina Man MD Responsible Provider: JEANNA Andujar CRNA    Anesthesia Type: general ASA Status: 3          Anesthesia Type: No value filed.     Sophie Phase I: Sophie Score: 9    Sophie Phase II:        Anesthesia Post Evaluation    Patient location during evaluation: PACU  Patient participation: complete - patient participated  Level of consciousness: sleepy but conscious  Pain score: 0  Airway patency: patent  Nausea & Vomiting: no nausea and no vomiting  Complications: no  Cardiovascular status: hemodynamically stable  Respiratory status: acceptable, spontaneous ventilation and nasal cannula  Hydration status: euvolemic

## 2022-10-17 NOTE — PROGRESS NOTES
Physical Therapy  Name: Magdalena Negron  MRN:  [id number]  Date of service:  10/17/2022     10/17/22 4215   Restrictions/Precautions   Restrictions/Precautions Fall Risk;Contact Precautions   Required Braces or Orthoses? Yes   Required Braces or Orthoses   Spinal Other LSO   Position Activity Restriction   Spinal Precautions No Bending; No Lifting; No Twisting   Subjective   Subjective Pt agreeable to therapy. Bed Mobility   Supine to Sit Minimal assistance   Sit to Supine Minimal assistance   Comment donned LSO while sitting EOB   Transfers   Sit to Stand Contact guard assistance   Stand to Sit Contact guard assistance   Comment pt stood from EOB and from toilet   Ambulation   Surface Level tile   Device Rolling Walker   Assistance Contact guard assistance   Quality of Gait slow, guarded, steady, no LOB noted, IV   Gait Deviations Decreased step length; Slow Candida;Decreased step height   Distance 10' x2   Short Term Goals   Time Frame for Short Term Goals 2 WKS   Short Term Goal 1 TF'S WITH RW, CGA   Short Term Goal 2  FT WITH RW, CGA   Conditions Requiring Skilled Therapeutic Intervention   Body Structures, Functions, Activity Limitations Requiring Skilled Therapeutic Intervention Decreased functional mobility ; Decreased strength;Decreased endurance;Decreased posture; Increased pain   Assessment Assisted pt with donning LSO and then amb to/from the BR, then BTB.    Activity Tolerance   Activity Tolerance Patient tolerated treatment well   PT Plan of Care   Monday X   Safety Devices   Type of Devices Bed alarm in place;Call light within reach;Gait belt;Left in bed         Electronically signed by Deja Breaux PTA on 10/17/2022 at 3:44 PM

## 2022-10-18 PROCEDURE — 97535 SELF CARE MNGMENT TRAINING: CPT

## 2022-10-18 PROCEDURE — 97165 OT EVAL LOW COMPLEX 30 MIN: CPT

## 2022-10-18 PROCEDURE — 99024 POSTOP FOLLOW-UP VISIT: CPT | Performed by: NEUROLOGICAL SURGERY

## 2022-10-18 PROCEDURE — 6370000000 HC RX 637 (ALT 250 FOR IP): Performed by: NEUROLOGICAL SURGERY

## 2022-10-18 PROCEDURE — 97116 GAIT TRAINING THERAPY: CPT

## 2022-10-18 PROCEDURE — 2580000003 HC RX 258: Performed by: NEUROLOGICAL SURGERY

## 2022-10-18 PROCEDURE — 97530 THERAPEUTIC ACTIVITIES: CPT

## 2022-10-18 PROCEDURE — 6360000002 HC RX W HCPCS: Performed by: NEUROLOGICAL SURGERY

## 2022-10-18 PROCEDURE — 1210000000 HC MED SURG R&B

## 2022-10-18 RX ADMIN — HYDROCODONE BITARTRATE AND ACETAMINOPHEN 2 TABLET: 5; 325 TABLET ORAL at 19:54

## 2022-10-18 RX ADMIN — HYDROCODONE BITARTRATE AND ACETAMINOPHEN 2 TABLET: 5; 325 TABLET ORAL at 08:21

## 2022-10-18 RX ADMIN — PIPERACILLIN AND TAZOBACTAM 3375 MG: 3; .375 INJECTION, POWDER, LYOPHILIZED, FOR SOLUTION INTRAVENOUS at 03:41

## 2022-10-18 RX ADMIN — TIZANIDINE 4 MG: 4 TABLET ORAL at 21:42

## 2022-10-18 RX ADMIN — HYDROCODONE BITARTRATE AND ACETAMINOPHEN 2 TABLET: 5; 325 TABLET ORAL at 12:31

## 2022-10-18 RX ADMIN — ALLOPURINOL 200 MG: 100 TABLET ORAL at 08:21

## 2022-10-18 RX ADMIN — LEVOTHYROXINE SODIUM 75 MCG: 75 TABLET ORAL at 06:18

## 2022-10-18 RX ADMIN — Medication 1 TABLET: at 08:21

## 2022-10-18 RX ADMIN — HYDROCODONE BITARTRATE AND ACETAMINOPHEN 2 TABLET: 5; 325 TABLET ORAL at 03:39

## 2022-10-18 RX ADMIN — ATORVASTATIN CALCIUM 20 MG: 20 TABLET, FILM COATED ORAL at 08:21

## 2022-10-18 RX ADMIN — PANTOPRAZOLE SODIUM 40 MG: 40 TABLET, DELAYED RELEASE ORAL at 06:18

## 2022-10-18 ASSESSMENT — PAIN DESCRIPTION - DESCRIPTORS
DESCRIPTORS: ACHING
DESCRIPTORS: ACHING;SORE
DESCRIPTORS: ACHING;SORE

## 2022-10-18 ASSESSMENT — PAIN DESCRIPTION - ORIENTATION
ORIENTATION: MID;LOWER
ORIENTATION: MID
ORIENTATION: MID;LOWER

## 2022-10-18 ASSESSMENT — ENCOUNTER SYMPTOMS
COUGH: 0
DIARRHEA: 0
CHEST TIGHTNESS: 0
WHEEZING: 0
NAUSEA: 0
SHORTNESS OF BREATH: 0
CONSTIPATION: 0
ABDOMINAL PAIN: 0

## 2022-10-18 ASSESSMENT — PAIN SCALES - GENERAL
PAINLEVEL_OUTOF10: 8
PAINLEVEL_OUTOF10: 2
PAINLEVEL_OUTOF10: 9
PAINLEVEL_OUTOF10: 2
PAINLEVEL_OUTOF10: 7
PAINLEVEL_OUTOF10: 7

## 2022-10-18 ASSESSMENT — PAIN DESCRIPTION - LOCATION
LOCATION: BACK

## 2022-10-18 ASSESSMENT — PAIN DESCRIPTION - FREQUENCY
FREQUENCY: INTERMITTENT
FREQUENCY: CONTINUOUS

## 2022-10-18 ASSESSMENT — PAIN DESCRIPTION - PAIN TYPE
TYPE: ACUTE PAIN;SURGICAL PAIN
TYPE: ACUTE PAIN;SURGICAL PAIN

## 2022-10-18 ASSESSMENT — PAIN - FUNCTIONAL ASSESSMENT
PAIN_FUNCTIONAL_ASSESSMENT: PREVENTS OR INTERFERES SOME ACTIVE ACTIVITIES AND ADLS
PAIN_FUNCTIONAL_ASSESSMENT: ACTIVITIES ARE NOT PREVENTED

## 2022-10-18 ASSESSMENT — PAIN DESCRIPTION - ONSET
ONSET: ON-GOING
ONSET: ON-GOING

## 2022-10-18 ASSESSMENT — PAIN DESCRIPTION - DIRECTION: RADIATING_TOWARDS: NO

## 2022-10-18 NOTE — PROGRESS NOTES
Facility/Department: [facility] SURG SERVICES  Reassessment/Daily Treatment Note  NAME: Jabari Oconnor  : 1945  MRN: [id number]    Date of Service: 10/18/2022    Discharge Recommendations:  Home with assist PRN  OT Equipment Recommendations  Equipment Needed: Yes  Other: May want [facility] for night time urgency    Assessment   Assessment: Reassessed s/p L1 kyphoplasty 10/17 and treatment initiated. Doing well since surgery yesterday and states it has been a great reliever of her pain. No overt barriers for home identified  Treatment Diagnosis: Diverticulitis, fall, L1 kyphoplaty  Activity Tolerance  Activity Tolerance: Patient Tolerated treatment well         Patient Diagnosis(es): The primary encounter diagnosis was Diverticulitis of colon. Diagnoses of Closed fracture of first lumbar vertebra, unspecified fracture morphology, initial encounter ([facility] [address] and Closed compression fracture of body of L1 vertebra ([facility] [address] were also pertinent to this visit. has a past medical history of Acute renal failure ([address] Arthritis, Breast cancer ([address] Cancer ([address] Colon polyps, Diverticulitis, GERD (gastroesophageal reflux disease), Hyperlipidemia, Hypertension, Joint pain, and Thyroid condition. has a past surgical history that includes Colonoscopy (03/10/2009); Thyroidectomy, partial; Tonsillectomy and adenoidectomy; Patella surgery; Bunionectomy; bladder repair; Blepharoplasty; Abdomen surgery; Ovary removal; Breast enhancement surgery; Total knee arthroplasty (Left, 2016); knee surgery (Left, 2016); Colonoscopy (2014); Colonoscopy (N/A, 2019); Cataract removal (Bilateral); Mastectomy (Bilateral, ); Partial hysterectomy; Hysterectomy; Upper gastrointestinal endoscopy (N/A, 2020); Upper gastrointestinal endoscopy (N/A, 2020); Upper gastrointestinal endoscopy (N/A, 2021); and Spine surgery (Bilateral, 10/17/2022).     Restrictions  Restrictions/Precautions  Restrictions/Precautions: Fall Risk, Contact Precautions  Required Braces or Orthoses?: Yes  Required Braces or Orthoses  Spinal Other: LSO  Position Activity Restriction  Spinal Precautions: No Bending, No Lifting, No Twisting    Subjective   General  Chart Reviewed: Yes  Patient assessed for rehabilitation services?: Yes  Family / Caregiver Present: No  General Comment  Comments: Patient reporting a significant reduction in pain since surgery yesterday  Pre Treatment Pain Screening  Pain at present: 0  Scale Used: Numeric Score  Intervention List: Patient able to continue with treatment  Comments / Details: No complaint of pain with activity either     Objective    ADL  Feeding: Independent  Grooming: Independent;Setup  UE Bathing: Modified independent ;Setup  LE Bathing: Minimal assistance  UE Dressing: Independent;Setup (including LSO)  LE Dressing: Moderate assistance (for seated aspects, CGA for standing aspects)  Toileting: Contact guard assistance        Toilet Transfers  Toilet - Technique: Ambulating  Toilet Transfer: Contact guard assistance  Bed mobility  Rolling to Left: Supervision  Rolling to Right: Supervision  Supine to Sit: Supervision  Transfers  Stand Step Transfers: Contact guard assistance                       Cognition  Overall Cognitive Status: WNL                                         Plan   Occupational Therapy Plan  Times Per Week: 3-5  Times Per Day: Once a day  Additional Comments: See prn for needs. Core education has been completed    Goals (On-Going)   Short Term Goals  Time Frame for Short Term Goals: 1-2 weeks  Short Term Goal 1: Min A for dressing (met)  Short Term Goal 2: Min A for toileting (met)  Short Term Goal 3: CGA for light ambulatory ADL (met)  Short Term Goal 4: CGA for transfers (progressing)  Short Term Goal 5:  Independent with therapeutic activity recommendations  Long Term Goals  Long Term Goal 1: Modified independent with dressing and toileting  Long Term Goal 2: Modified independent with transfers and light ambulatory ADL  Long Term Goal 3: Verbalize/demo back protocols as they relate to household mobility and ADL     Tx initiated:  AE/DME training--supervision for dressing, bathing, CGA for light home management adaptations, used appropriate bed mobility and sit to stand techniques after training, able to restate brace protocol and don and doff LSO. Will follow prn.    Tx Time  Minutes  Alex [id number], OT/L  Electronically signed by Alexus Farnsworth OT/L on 10/18/2022 at 11:15 AM.

## 2022-10-18 NOTE — PROGRESS NOTES
Physical Therapy  Name: Joaquín Baird  MRN:  [id number]  Date of service:  10/18/2022       10/18/22 1057   Restrictions/Precautions   Restrictions/Precautions Fall Risk;Contact Precautions   Required Braces or Orthoses? Yes  (Simultaneous filing. User may not have seen previous data.)   Required Braces or Orthoses   Spinal Other LSO  (Simultaneous filing. User may not have seen previous data.)   Position Activity Restriction   Spinal Precautions No Bending; No Lifting; No Twisting  (Simultaneous filing. User may not have seen previous data.)   General   Family / Caregiver Present Yes  ()   Subjective   Subjective I am glad you came. He () needs to see me to things so he will let me go home. Oxygen Therapy   O2 Device None (Room air)   Bed Mobility   Sit to Supine Contact guard assistance;Stand by assistance   Transfers   Sit to Stand Contact guard assistance;Stand by assistance   Stand to Sit Contact guard assistance;Stand by assistance   Bed to Chair Contact guard assistance;Stand by assistance   Ambulation   Surface Level tile   Device Rolling Walker   Assistance Contact guard assistance   Quality of Gait slow, guarded, steady, no LOB noted, IV   Gait Deviations Decreased step length; Slow Candida;Decreased step height   Distance 65 feet   Other Activities   Comment asssited to bathrooom before ambulation. She was able to perform all self care and clean up Independently. Short Term Goals   Time Frame for Short Term Goals 2 WKS   Short Term Goal 1 TF'S WITH RW, CGA   Short Term Goal 2  FT WITH RW, CGA   Conditions Requiring Skilled Therapeutic Intervention   Body Structures, Functions, Activity Limitations Requiring Skilled Therapeutic Intervention Decreased functional mobility ; Decreased strength;Decreased endurance;Decreased posture; Increased pain   Assessment Instructions given on use of LSO. Verbal reminder no twisting.    Therapy Prognosis Good   Discharge Recommendations Continue to assess pending progress; Patient would benefit from continued therapy after discharge   Activity Tolerance   Activity Tolerance Patient tolerated treatment well   Physcial Therapy Plan   General Plan 5-7 times per week   Therapy Duration 2 Weeks   Current Treatment Recommendations Strengthening; Functional mobility training;Transfer training;Gait training;Pain management;Return to work related activity; Safety education & training;Positioning; Therapeutic activities   PT Plan of Care   Tuesday X   Safety Devices   Type of Devices Call light within reach; Left in bed  ( in room)   PT Whiteboard Notes   Therapy Whiteboard JUAN MANUEL Jeffers RE 10-28       Electronically signed by Sonam Gamino PTA on 10/18/2022 at 11:02 AM

## 2022-10-18 NOTE — PROGRESS NOTES
NEUROSURGERY POSTOPERATIVE PROGRESS NOTE      Chief Complaint:   Chief Complaint   Patient presents with    Back Pain     Pt c/o low back pain after lying in the floor all night. Dizziness     Pt states she got up in the middle of the night to go to the restroom. States she got very dizzy and laid in the floor to prevent passing out or falling. Denies LOC. Date of Surgery: 10/17/2022    Procedure Performed: L1 kyphoplasty      Interval Update:    No overnight events. Patient is sitting at the EOB this AM in LSO brace, preparing to mobilize to chair with PT. She reports dramatic improvement in her back pain since surgery. She denies any new radicular pain, weakness or sensory loss. HPI:     The patient is a 65 y.o. female who presented to the [facility] ED on 10/13/2022 complaining of weakness and back pain. She has been suffering from a flare-up of diverticulitis recently with significant diarrhea and abdominal pain. She states she stood up in her bathroom and felt like she was going to pass out and sat down on her backside. She does not feel that she fell or had a significant impact but she did experience sudden severe back pain afterwards. She was evaluated in the ED and a CT scan of her abdomen/pelvis was obtained and this revealed findings concerning for diverticulits as well as small bowel inflammation and also demonstrated an L1 compression fracture. She has been admitted to the medical service and I have been asked to provide neurosurgical consultation regarding the compression fracture. On questioning, she continues to endorse severe back pain with movement. She denies any lower extremity radicular pain, focal weakness or sensory loss. She denies loss of bowel or bladder control. Of note, she underwent a DEXA scan in 7/2022 that revealed osteopenia with a T-score of -1.2.        Objective:    /62   Pulse 58   Temp 97.3 °F (36.3 °C) (Temporal)   Resp 15   Ht 5' 4\" (1.626 m)   Wt 185 lb (83.9 kg)   SpO2 94%   BMI 31.76 kg/m²         Physical Exam:    General: alert, cooperative, no distress  Cardiorespiratory: unlabored breathing  Wound: Stab incision C/D/I with dermabond      Neurologic Exam:    Mental Status: Alert, oriented, thought content appropriate  Cranial Nerves: PERRL, EOMI, symmetric facies, tongue midline  Motor: Motor exam is symmetrical 5 out of 5 all extremities bilaterally  Somatosensory: normal light touch sensation          Assessment and Plan:  65 y.o. F s/p L1 kyphoplasty on 10/17/2022 for a traumatic L1 compression fracture causing intractable back pain.    -Doing well postoperatively  -Continue to mobilize with PT/OT in LSO brace  -Advance diet per primary team  -Stable for discharge home from neurosurgical perspective if she is able to safely mobilize  -Will plan to follow up in two weeks for wound check with repeat standing lumbar x-rays.         Electronically signed by Deandre Yang MD on 10/18/2022 at 1:24 PM

## 2022-10-18 NOTE — PROGRESS NOTES
Physical Therapy  Name: Noel Villanueva  MRN:  [id number]  Date of service:  10/18/2022       10/18/22 1351   Restrictions/Precautions   Restrictions/Precautions Fall Risk;Contact Precautions   Required Braces or Orthoses? Yes   Required Braces or Orthoses   Spinal Other LSO   Position Activity Restriction   Spinal Precautions No Bending; No Lifting; No Twisting   General   Family / Caregiver Present Yes  ()   Subjective   Subjective Oh yeah I want to walk. Oxygen Therapy   O2 Device None (Room air)   Bed Mobility   Supine to Sit Contact guard assistance   Sit to Supine Contact guard assistance;Stand by assistance   Transfers   Sit to Stand Contact guard assistance;Stand by assistance   Stand to Sit Contact guard assistance;Stand by assistance   Bed to Chair Contact guard assistance;Stand by assistance   Ambulation   Surface Level tile   Device Rolling Walker   Assistance Contact guard assistance   Quality of Gait slow, guarded, steady, no LOB noted, IV   Gait Deviations Decreased step length; Slow Candida;Decreased step height   Distance 75 feet   Short Term Goals   Time Frame for Short Term Goals 2 WKS   Short Term Goal 1 TF'S WITH RW, CGA   Short Term Goal 2  FT WITH RW, CGA   Conditions Requiring Skilled Therapeutic Intervention   Body Structures, Functions, Activity Limitations Requiring Skilled Therapeutic Intervention Decreased functional mobility ; Decreased strength;Decreased endurance;Decreased posture; Increased pain   Assessment Instructions given on use of LSO. Verbal reminder no twisting. Therapy Prognosis Good   Discharge Recommendations Continue to assess pending progress; Patient would benefit from continued therapy after discharge   Activity Tolerance   Activity Tolerance Patient tolerated treatment well   Physcial Therapy Plan   General Plan 5-7 times per week   Therapy Duration 2 Weeks   Current Treatment Recommendations Strengthening; Functional mobility training;Transfer training;Gait training;Pain management;Return to work related activity; Safety education & training;Positioning; Therapeutic activities   Safety Devices   Type of Devices Call light within reach; Left in bed   PT Whiteboard Notes   Therapy Whiteboard L1 Yasir Sainz RE 10-28       Electronically signed by Zhou Echeverria PTA on 10/18/2022 at 3:41 PM

## 2022-10-18 NOTE — PROGRESS NOTES
Daily Progress Note  Gaby Lora  MRN: [id number] LOS: 5    Admit Date: 10/13/2022   10/18/2022 9:54 AM    Subjective: Interval History:    Reviewed overnight events and nursing notes. Must improved s/p kyphoplasty, no other issues. States her diarrhea is resolved. Review of Systems   Constitutional:  Negative for chills and fever. Respiratory:  Negative for cough, chest tightness, shortness of breath and wheezing. Cardiovascular:  Negative for chest pain, palpitations and leg swelling. Gastrointestinal:  Negative for abdominal pain, constipation, diarrhea and nausea. DIET:  ADULT DIET; Full Liquid    Medications:      sodium chloride        calcium-cholecalciferol  1 tablet Oral Daily    lidocaine  1 patch TransDERmal Daily    allopurinol  200 mg Oral Daily    levothyroxine  75 mcg Oral Daily    pantoprazole  40 mg Oral QAM AC    atorvastatin  20 mg Oral Daily    sodium chloride flush  5-40 mL IntraVENous 2 times per day    piperacillin-tazobactam  3,375 mg IntraVENous Q8H         Objective:     Vitals: /62   Pulse 58   Temp 97.3 °F (36.3 °C) (Temporal)   Resp 15   Ht 5' 4\" (1.626 m)   Wt 185 lb (83.9 kg)   SpO2 94%   BMI 31.76 kg/m²    Intake/Output Summary (Last 24 hours) at 10/18/2022 0954  Last data filed at 10/18/2022 0943  Gross per 24 hour   Intake 4033.67 ml   Output 400 ml   Net 3633.67 ml    Temp (24hrs), Av.5 °F (36.4 °C), Min:96.8 °F (36 °C), Max:98.2 °F (36.8 °C)    Glucose:  Lab Results   Component Value Date    POCGLU 109 (H) 2018     Physical Examination:   Objective:  General Appearance:  Comfortable and well-appearing. Vital signs: (most recent): Blood pressure 137/62, pulse 58, temperature 97.3 °F (36.3 °C), temperature source Temporal, resp. rate 15, height 5' 4\" (1.626 m), weight 185 lb (83.9 kg), SpO2 94 %. No fever. Lungs:  Normal effort and normal respiratory rate. Breath sounds clear to auscultation.   She is not in respiratory distress. Heart: Normal rate. Regular rhythm. No murmur. Abdomen: Abdomen is soft and non-distended. Bowel sounds are normal.   There is no abdominal tenderness. Labs:  No results found for: CHEMIS, CBC     Imaging:  XR LUMBAR SPINE (2-3 VIEWS)  Narrative: Exam:X-RAYS OF THE LUMBAR SPINE   Clinical data: Checking stability in brace from compression fracture L1  Technique: Four views of the lumbar spine. Prior studies:  CT of the abdomen and pelvis dated 10/13/2022. Abigail Ceballos Findings:  Anterior wedge compression fracture of the superior L1 endplate with 17% loss of height. Vertebral body heights and disc spaces are otherwise well preserved. Alignment is unremarkable. Minor facet hypertrophy in the lower lumbar spine. Mild curvature of the thoracolumbar spine convex to the right and the lumbar spine convex to the left. Osteopenia. Impression: Anterior wedge compression fracture of the superior L1 endplate with 13% loss of height, not definitively changed as compared to the recent CT. Recommendation:   Follow up as clinically indicated. Electronically Signed by Anila Benitez MD at 16-Oct-2022 02:31:07 PM                    Assessment and Plan:     Primary Problem:  Diverticulitis    Hospital Problem list:  Principal Problem:    Diverticulitis  Active Problems:    Compression fracture of L1 lumbar vertebra ([address]  Resolved Problems:    * No resolved hospital problems. *    Assessment: 69-year-old female with past medical history of hypertension, hyperlipidemia who presents for diverticulitis with systemic symptoms. Plan:    Acute diverticulitis  Resolved, will dc zosyn today. Compression fracture  Neurosurgery consulted and following. Underwent kyphoplasty with good results. Will have PT/OT work with her today to see if she needs placement. Medically stable for discharge today, destination pending on how she does w/ PT    Reviewed treatment plans with the patient and/or family.      Code Status: Full Code    Electronically signed by Aurelia Lombard, MD on 10/18/2022 at 9:54 AM

## 2022-10-19 ENCOUNTER — TELEPHONE (OUTPATIENT)
Dept: NEUROSURGERY | Age: 77
End: 2022-10-19

## 2022-10-19 VITALS
RESPIRATION RATE: 16 BRPM | BODY MASS INDEX: 31.58 KG/M2 | HEIGHT: 64 IN | OXYGEN SATURATION: 95 % | HEART RATE: 50 BPM | WEIGHT: 185 LBS | DIASTOLIC BLOOD PRESSURE: 70 MMHG | SYSTOLIC BLOOD PRESSURE: 166 MMHG | TEMPERATURE: 97.4 F

## 2022-10-19 DIAGNOSIS — S32.010A COMPRESSION FRACTURE OF L1 VERTEBRA, INITIAL ENCOUNTER (HCC): Primary | ICD-10-CM

## 2022-10-19 LAB
ANION GAP SERPL CALCULATED.3IONS-SCNC: 8 MMOL/L (ref 7–19)
BASOPHILS ABSOLUTE: 0 K/UL (ref 0–0.2)
BASOPHILS RELATIVE PERCENT: 0.4 % (ref 0–1)
BUN BLDV-MCNC: 12 MG/DL (ref 8–23)
CALCIUM SERPL-MCNC: 8.7 MG/DL (ref 8.8–10.2)
CHLORIDE BLD-SCNC: 97 MMOL/L (ref 98–111)
CO2: 29 MMOL/L (ref 22–29)
CREAT SERPL-MCNC: 0.8 MG/DL (ref 0.5–0.9)
EOSINOPHILS ABSOLUTE: 0.1 K/UL (ref 0–0.6)
EOSINOPHILS RELATIVE PERCENT: 0.6 % (ref 0–5)
GFR SERPL CREATININE-BSD FRML MDRD: >60 ML/MIN/{1.73_M2}
GLUCOSE BLD-MCNC: 120 MG/DL (ref 74–109)
HCT VFR BLD CALC: 33.1 % (ref 37–47)
HEMOGLOBIN: 10.9 G/DL (ref 12–16)
IMMATURE GRANULOCYTES #: 0.4 K/UL
LYMPHOCYTES ABSOLUTE: 2.3 K/UL (ref 1.1–4.5)
LYMPHOCYTES RELATIVE PERCENT: 23.9 % (ref 20–40)
MCH RBC QN AUTO: 28.7 PG (ref 27–31)
MCHC RBC AUTO-ENTMCNC: 32.9 G/DL (ref 33–37)
MCV RBC AUTO: 87.1 FL (ref 81–99)
MONOCYTES ABSOLUTE: 0.8 K/UL (ref 0–0.9)
MONOCYTES RELATIVE PERCENT: 8 % (ref 0–10)
NEUTROPHILS ABSOLUTE: 5.9 K/UL (ref 1.5–7.5)
NEUTROPHILS RELATIVE PERCENT: 62.7 % (ref 50–65)
PDW BLD-RTO: 13.6 % (ref 11.5–14.5)
PLATELET # BLD: 390 K/UL (ref 130–400)
PMV BLD AUTO: 9.7 FL (ref 9.4–12.3)
POTASSIUM REFLEX MAGNESIUM: 3.9 MMOL/L (ref 3.5–5)
RBC # BLD: 3.8 M/UL (ref 4.2–5.4)
SODIUM BLD-SCNC: 134 MMOL/L (ref 136–145)
WBC # BLD: 9.4 K/UL (ref 4.8–10.8)

## 2022-10-19 PROCEDURE — 6370000000 HC RX 637 (ALT 250 FOR IP): Performed by: NEUROLOGICAL SURGERY

## 2022-10-19 PROCEDURE — 97535 SELF CARE MNGMENT TRAINING: CPT

## 2022-10-19 PROCEDURE — 80048 BASIC METABOLIC PNL TOTAL CA: CPT

## 2022-10-19 PROCEDURE — 2580000003 HC RX 258: Performed by: NEUROLOGICAL SURGERY

## 2022-10-19 PROCEDURE — 36415 COLL VENOUS BLD VENIPUNCTURE: CPT

## 2022-10-19 PROCEDURE — 97530 THERAPEUTIC ACTIVITIES: CPT

## 2022-10-19 PROCEDURE — 85025 COMPLETE CBC W/AUTO DIFF WBC: CPT

## 2022-10-19 RX ORDER — HYDROCODONE BITARTRATE AND ACETAMINOPHEN 5; 325 MG/1; MG/1
1 TABLET ORAL EVERY 4 HOURS PRN
Qty: 12 TABLET | Refills: 0 | Status: SHIPPED | OUTPATIENT
Start: 2022-10-19 | End: 2022-10-22

## 2022-10-19 RX ORDER — LEVOTHYROXINE SODIUM 0.07 MG/1
75 TABLET ORAL DAILY
Qty: 30 TABLET | Refills: 3 | Status: SHIPPED | OUTPATIENT
Start: 2022-10-20

## 2022-10-19 RX ADMIN — LEVOTHYROXINE SODIUM 75 MCG: 75 TABLET ORAL at 05:11

## 2022-10-19 RX ADMIN — Medication 1 TABLET: at 08:45

## 2022-10-19 RX ADMIN — ATORVASTATIN CALCIUM 20 MG: 20 TABLET, FILM COATED ORAL at 08:45

## 2022-10-19 RX ADMIN — SODIUM CHLORIDE, PRESERVATIVE FREE 10 ML: 5 INJECTION INTRAVENOUS at 08:46

## 2022-10-19 RX ADMIN — ALLOPURINOL 200 MG: 100 TABLET ORAL at 08:45

## 2022-10-19 RX ADMIN — PANTOPRAZOLE SODIUM 40 MG: 40 TABLET, DELAYED RELEASE ORAL at 05:11

## 2022-10-19 RX ADMIN — HYDROCODONE BITARTRATE AND ACETAMINOPHEN 2 TABLET: 5; 325 TABLET ORAL at 10:57

## 2022-10-19 RX ADMIN — POLYETHYLENE GLYCOL 3350 17 G: 17 POWDER, FOR SOLUTION ORAL at 05:17

## 2022-10-19 RX ADMIN — HYDROCODONE BITARTRATE AND ACETAMINOPHEN 2 TABLET: 5; 325 TABLET ORAL at 05:11

## 2022-10-19 ASSESSMENT — PAIN SCALES - GENERAL
PAINLEVEL_OUTOF10: 7
PAINLEVEL_OUTOF10: 2
PAINLEVEL_OUTOF10: 9
PAINLEVEL_OUTOF10: 3

## 2022-10-19 ASSESSMENT — PAIN DESCRIPTION - LOCATION: LOCATION: BACK

## 2022-10-19 ASSESSMENT — PAIN DESCRIPTION - ORIENTATION: ORIENTATION: MID;LOWER

## 2022-10-19 NOTE — CARE COORDINATION
Spoke with patient regarding MD orders for [facility] services. Patient agreeable and has chosen [facility]. Referral Faxed. [address] [place] [address] [phone]. [phone]. Please notify [address] [place] [address] when patient discharges and fax DC Summary,  DC med list and any new [facility] orders. The Patient and/or patient representative was provided with a choice of provider and agrees   with the discharge plan. [x] Yes [] No    Freedom of choice list was provided with basic dialogue that supports the patient's individualized plan of care/goals, treatment preferences and shares the quality data associated with the providers.  [x] Yes [] No  Electronically signed by Karen Barboza on 10/19/2022 at 9:52 AM

## 2022-10-19 NOTE — PROGRESS NOTES
Occupational Therapy  Facility/Department: [facility] 5 SURG SERVICES  Daily Treatment Note  NAME: Garth Bey  : 1945  MRN: [id number]    Date of Service: 10/19/2022    Discharge Recommendations:  Home with assist PRN  OT Equipment Recommendations  Other: May want [facility] for night time urgency    Assessment   Assessment: Pt tolerated tx well, worked on dressing with use of AE before going home. Doing well since surgery and states it has been a great reliever of her pain. No overt barriers for home identified  Treatment Diagnosis: Diverticulitis, fall, L1 kyphoplaty  Prognosis: Good  Activity Tolerance  Activity Tolerance: Patient Tolerated treatment well  Activity Tolerance Comments: . Patient Diagnosis(es): The primary encounter diagnosis was Diverticulitis of colon. Diagnoses of Closed fracture of first lumbar vertebra, unspecified fracture morphology, initial encounter [facility]), Closed compression fracture of body of L1 vertebra ([facility] [address] and Compression fracture of L1 vertebra, initial encounter [facility]) were also pertinent to this visit. has a past medical history of Acute renal failure ([facility] [address] Arthritis, Breast cancer ([facility] [address] Cancer ([facility] [address] Colon polyps, Diverticulitis, GERD (gastroesophageal reflux disease), Hyperlipidemia, Hypertension, Joint pain, and Thyroid condition. has a past surgical history that includes Colonoscopy (03/10/2009); Thyroidectomy, partial; Tonsillectomy and adenoidectomy; Patella surgery; Bunionectomy; bladder repair; Blepharoplasty; Abdomen surgery; Ovary removal; Breast enhancement surgery; Total knee arthroplasty (Left, 2016); knee surgery (Left, 2016); Colonoscopy (2014); Colonoscopy (N/A, 2019); Cataract removal (Bilateral); Mastectomy (Bilateral, ); Partial hysterectomy; Hysterectomy; Upper gastrointestinal endoscopy (N/A, 2020); Upper gastrointestinal endoscopy (N/A, 2020);  Upper gastrointestinal endoscopy (N/A, 2021); and Spine surgery (Bilateral, 10/17/2022). Restrictions  Restrictions/Precautions  Restrictions/Precautions: Fall Risk, Contact Precautions  Required Braces or Orthoses?: Yes  Required Braces or Orthoses  Spinal Other: LSO  Position Activity Restriction  Spinal Precautions: No Bending, No Lifting, No Twisting  Subjective   General  Chart Reviewed: Yes  Patient assessed for rehabilitation services?: Yes  Response to previous treatment: Patient with no complaints from previous session  Family / Caregiver Present: No  General Comment  Comments: Patient reporting a significant reduction in pain since surgery yesterday  Pre Treatment Pain Screening  Pain at present: 0   Orientation     Objective    ADL  Grooming: Independent;Setup  UE Dressing: Independent;Setup  LE Dressing: Supervision;Verbal cueing (with use of AE)  Toileting: Contact guard assistance  Additional Comments: . Toilet Transfers  Toilet - Technique: Ambulating  Equipment Used: Grab bars  Toilet Transfer: Contact guard assistance  Bed mobility  Rolling to Left: Supervision  Rolling to Right: Supervision  Supine to Sit: Supervision  Sit to Supine: Supervision  Bed Mobility Comments: . Transfers  Stand Step Transfers: Contact guard assistance  Sit to stand: Stand by assistance  Stand to sit: Stand by assistance                                                           Plan   Occupational Therapy Plan  Times Per Week: 3-5  Times Per Day: Once a day  Additional Comments: See prn for needs. Core education has been completed  Goals  Short Term Goals  Time Frame for Short Term Goals: 1-2 weeks  Short Term Goal 1: Min A for dressing (met)  Short Term Goal 2: Min A for toileting (met)  Short Term Goal 3: CGA for light ambulatory ADL (met)  Short Term Goal 4: CGA for transfers (progressing)  Short Term Goal 5:  Independent with therapeutic activity recommendations  Long Term Goals  Long Term Goal 1: Modified independent with dressing and toileting  Long Term Goal 2:  Modified independent with transfers and light ambulatory ADL  Long Term Goal 3: Verbalize/demo back protocols as they relate to household mobility and ADL       Therapy Time   Individual Concurrent Group Co-treatment   Time In           Time Out           Minutes              MARTÍNEZ Rajput  Electronically signed by MARTÍNEZ Rajput on 10/19/2022 at 12:04 PM

## 2022-10-19 NOTE — TELEPHONE ENCOUNTER
Agata Ureña a nurse with [facility] called patient needs a 2 week post op and xray.  Please called Agata Ureña at [phone]      Thank Dante Harden

## 2022-10-19 NOTE — DISCHARGE INSTR - DIET
Good nutrition is important when healing from an illness, injury, or surgery. Follow any nutrition recommendations given to you during your hospital stay. If you were given an oral nutrition supplement while in the hospital, continue to take this supplement at home. You can take it with meals, in-between meals, and/or before bedtime. These supplements can be purchased at most local grocery stores, pharmacies, and chain [employer]stores. If you have any questions about your diet or nutrition, call the hospital and ask for the dietitian.     Regular high fiber diet

## 2022-10-19 NOTE — PROGRESS NOTES
2/26/21, 7:11 AM EST  DISCHARGE PLANNING EVALUATION:    Cali Carver       Admitted: 2/26/2021/ [facility] day: 0   Location: 3B-30/030-A Reason for admit: Hypokalemia [E87.6]  A-fib ([address] [I48.91]   PMH:  has a past medical history of Aortic valve disorders, Atrial fibrillation ([address] Atrial flutter ([address] Encounter for cardioversion procedure, Encounter for cardioversion procedure, Frequent urination, H/O echocardiogram, H/O echocardiogram, Hemorrhage of rectum and anus, History of 24 hour EKG monitoring, History of echocardiogram, History of heart surgery, History of PFTs, Hx of transesophageal echocardiography ([facility]) for monitoring, Hypertension, Impaired fasting glucose, Knee pain, bilateral, Lipoma, Obesity (BMI 30.0-34.9), Obstructive sleep apnea (adult) (pediatric), Paroxysmal supraventricular tachycardia ([address] Tobacco use disorder, Urinary urgency, and Ventral hernia. Procedure: none  Barriers to Discharge:  Transfer from [facility] for aflutter. Hospitalist following. Cardio consult. Patient is scheduled for ablation at [facility] in 2 weeks. Amio gtt. Eliquis. 97% RA. Telemetry. PCP: Suraj Alvarado MD  Readmission Risk Score: 8%    Patient Goals/Plan/Treatment Preferences: Spoke with patient, he plans to return home at discharge independently. He does not anticipate discharge needs. He has a PCP. He is  Hopeful ablation can be done if scheduling allows. Transportation/Food Security/Housekeeping Addressed:  No issues identified. Physical Therapy  Name: Chele Quarles  MRN:  [id number]  Date of service:  10/19/2022     10/19/22 1113   Subjective   Subjective Attempt: Pt reports she has been up this morning and is leaving soon, wants to rest at this time.          Electronically signed by Amina Currie PTA on 10/19/2022 at 11:18 AM

## 2022-10-19 NOTE — CARE COORDINATION
2nd IMM Letter given to patient. Reviewed, discussed, answered questions, and signed by patient. Signed copy placed in patient's chart.      10/19/22 0953   IMM Letter   IMM Letter given to Patient/Family/Significant other/Guardian/POA/by: given to patient/spouse by Parish Castaneda RN BSN    IMM Letter date given: 10/19/22   IMM Letter time given: 6251   Electronically signed by Niesha Zendejas RN, BSN on 10/19/2022 at 9:53 AM

## 2022-10-19 NOTE — PROGRESS NOTES
CLINICAL PHARMACY NOTE: MEDS TO BEDS    Total # of Prescriptions Filled: 2   The following medications were delivered to the patient:  Levothyroxine 75 mcg  Hydrocodone-APAP 5-325    Additional Documentation:   Delivered Rx's to patients room. Gave Rx's to patients spouse Max Monreal. Max Monreal paid $1.17 copay with [employer].

## 2022-10-19 NOTE — DISCHARGE SUMMARY
Hospital Discharge Summary    Bandar Dunaway  :  1945  MRN:  [id number]    Admit date:  10/13/2022  Discharge date: 10/19/2022    Admitting Physician:    Ulises Brady MD    Discharge Diagnoses:    Principal Problem:    Diverticulitis  Active Problems:    Essential hypertension    Hypothyroidism    Compression fracture of L1 lumbar vertebra [facility])  Resolved Problems:    * No resolved hospital problems. [facility] Course: The patient was admitted for the above noted medical/surgical issues. Please see daily progress note for further details concerning their stay. The patient improved throughout their stay and reached maximum medical improvement on the day of discharge. The patient/family agree with the treatment plan as outlined above. All questions concerning their stay were answered prior to discharge. They understand the importance of follow up concerning any abnormal test results. Pleasant 16-year-old white female who presents to the hospital with acute diverticulitis. Patient had also fallen and had a compression fracture of L1. Neurosurgery was consulted. Patient underwent kyphoplasty. Patient's diverticulitis cleared was placed on oral antibiotics and on the morning of 10/19/2022 reached maximum patient benefit will be discharged home. Patient was educated about diverticular diet maintaining bowel movements and close follow-up in the office in 3 days with Dr. Osvaldo Sultana. Discharge Medications:         Medication List        START taking these medications      HYDROcodone-acetaminophen 5-325 MG per tablet  Commonly known as: NORCO  Take 1 tablet by mouth every 4 hours as needed for Pain for up to 3 days.             CHANGE how you take these medications      levothyroxine 75 MCG tablet  Commonly known as: SYNTHROID  Take 1 tablet by mouth Daily  Start taking on: 2022  What changed:   medication strength  how much to take  how to take this  when to take this  additional instructions            CONTINUE taking these medications      allopurinol 100 MG tablet  Commonly known as: ZYLOPRIM     olmesartan-hydroCHLOROthiazide 40-12.5 MG per tablet  Commonly known as: BENICAR HCT     omeprazole 40 MG delayed release capsule  Commonly known as: PRILOSEC  Take 1 capsule by mouth every morning (before breakfast)     simvastatin 20 MG tablet  Commonly known as: ZOCOR            STOP taking these medications      metroNIDAZOLE 500 MG tablet  Commonly known as: FLAGYL     ondansetron 4 MG tablet  Commonly known as: Zofran               Where to Get Your Medications        These medications were sent to [facility], [address]  [address] , [address] [place] [id number]      Phone: [phone]   HYDROcodone-acetaminophen 5-325 MG per tablet  levothyroxine 75 MCG tablet         Consults: Neurosurgery    Significant Diagnostic Studies:    See summary of labs/x-rays/path report for further details      Treatments:   Kyphoplasty and  IV antibiotics for diverticulitis    Disposition:   Home  Follow up with Clifford Hannon MD/Dr. Osvaldo Sultana in 3 days for TCM/hospital follow-up. .    Signed:  Clifford Hannon MD   10/19/2022, 7:39 AM

## 2022-10-21 ENCOUNTER — HOSPITAL ENCOUNTER (OUTPATIENT)
Dept: GENERAL RADIOLOGY | Facility: HOSPITAL | Age: 77
Discharge: HOME | End: 2022-10-21
Admitting: STUDENT IN AN ORGANIZED HEALTH CARE EDUCATION/TRAINING PROGRAM

## 2022-10-21 ENCOUNTER — TRANSCRIBE ORDERS (OUTPATIENT)
Dept: ADMINISTRATIVE | Facility: HOSPITAL | Age: 77
End: 2022-10-21

## 2022-10-21 DIAGNOSIS — M79.672 PAIN IN LEFT FOOT: Primary | ICD-10-CM

## 2022-10-21 DIAGNOSIS — M79.672 PAIN IN LEFT FOOT: ICD-10-CM

## 2022-10-21 PROCEDURE — 73630 X-RAY EXAM OF FOOT: CPT

## 2022-11-02 ENCOUNTER — HOSPITAL ENCOUNTER (OUTPATIENT)
Dept: GENERAL RADIOLOGY | Age: 77
Discharge: HOME | End: 2022-11-02
Payer: MEDICARE

## 2022-11-02 ENCOUNTER — OFFICE VISIT (OUTPATIENT)
Dept: NEUROSURGERY | Age: 77
End: 2022-11-02

## 2022-11-02 VITALS
WEIGHT: 185 LBS | HEIGHT: 64 IN | BODY MASS INDEX: 31.58 KG/M2 | HEART RATE: 68 BPM | SYSTOLIC BLOOD PRESSURE: 113 MMHG | DIASTOLIC BLOOD PRESSURE: 72 MMHG

## 2022-11-02 DIAGNOSIS — M85.80 OSTEOPENIA, UNSPECIFIED LOCATION: ICD-10-CM

## 2022-11-02 DIAGNOSIS — S32.010S COMPRESSION FRACTURE OF L1 VERTEBRA, SEQUELA: Primary | ICD-10-CM

## 2022-11-02 DIAGNOSIS — Z98.890 S/P KYPHOPLASTY: ICD-10-CM

## 2022-11-02 DIAGNOSIS — S32.010A COMPRESSION FRACTURE OF L1 VERTEBRA, INITIAL ENCOUNTER (HCC): ICD-10-CM

## 2022-11-02 PROCEDURE — 99024 POSTOP FOLLOW-UP VISIT: CPT | Performed by: NEUROLOGICAL SURGERY

## 2022-11-02 PROCEDURE — 72100 X-RAY EXAM L-S SPINE 2/3 VWS: CPT

## 2022-11-02 ASSESSMENT — ENCOUNTER SYMPTOMS
EYES NEGATIVE: 1
GASTROINTESTINAL NEGATIVE: 1
RESPIRATORY NEGATIVE: 1

## 2022-11-02 NOTE — PROGRESS NOTES
NEUROSURGERY POSTOPERATIVE FOLLOW UP NOTE      Chief Complaint:   Chief Complaint   Patient presents with    Follow-Up from Hospital     Patient is here for hfu after recently undergoing an L1 kyphoplasty on 10/17/2022. Patient states that from a surgical stand point she feels that she is recovering well, she notes no new complaints since surgery. She states she still has weakness and fatigue but also recently had diverticulitis and feels that her sx's are related to that and not her surgery. Wound Check     Wound check s/p L1 Kyphoplasty. Results     Discuss results of lumbar spine XR completed today. Date of Surgery: 10/17/2022    Procedure Performed: L1 kyphoplasty      Interval Update:     First follow-up visit after hospital discharge. She is doing well. Her back pain is controlled and she is not requiring pain medication. She complains of discomfort from her LSO brace. She denies any radicular pain, weakness or numbness in her legs. She feels she is still suffering from the after-effects of her diverticulitis and has difficulty eating and feels generally weak. HPI:     The patient is a 65 y.o. female who presented to the [facility] ED on 10/13/2022 complaining of weakness and back pain. She has been suffering from a flare-up of diverticulitis recently with significant diarrhea and abdominal pain. She states she stood up in her bathroom and felt like she was going to pass out and sat down on her backside. She does not feel that she fell or had a significant impact but she did experience sudden severe back pain afterwards. She was evaluated in the ED and a CT scan of her abdomen/pelvis was obtained and this revealed findings concerning for diverticulits as well as small bowel inflammation and also demonstrated an L1 compression fracture. She has been admitted to the medical service and I have been asked to provide neurosurgical consultation regarding the compression fracture.      On questioning, she continues to endorse severe back pain with movement. She denies any lower extremity radicular pain, focal weakness or sensory loss. She denies loss of bowel or bladder control. Of note, she underwent a DEXA scan in 7/2022 that revealed osteopenia with a T-score of -1.2. Objective:    /72   Pulse 68   Ht 5' 4\" (1.626 m)   Wt 185 lb (83.9 kg)   BMI 31.76 kg/m²         Physical Exam:    General: alert, cooperative, no distress  Cardiorespiratory: unlabored breathing  Wound: Healing well      Neurologic Exam:    Mental Status: Alert, oriented, thought content appropriate  Cranial Nerves: PERRL, EOMI, symmetric facies, tongue midline  Motor: Motor exam is symmetrical 5 out of 5 all extremities bilaterally  Somatosensory: normal light touch sensation      Imaging:  Standing x-rays of the lumbar spine show expected postoperative changes after L1 kyphoplasty. No postoperative complications identified. There is levoscoliosis of the lumbar spine. Assessment and Plan:  65 y.o. F s/p L1 kyphoplasty on 10/17/2022 for a traumatic L1 compression fracture causing intractable back pain.      -Doing well postoperatively  -Continue LSO brace when up and active. OK to remove in bed and when seated in a chair  -Encouraged patient to continue calcium and vitamin D supplementation and to discuss her recent compression fracture with her PCP as she might need more aggressive surveillance or treatment of osteoporosis/osteopenia  -Will plan to follow up in one month with repeat standing lumbar x-rays.         Electronically signed by Genet Willson MD on 11/2/2022 at 11:49 AM

## 2022-11-30 ENCOUNTER — OFFICE VISIT (OUTPATIENT)
Dept: OBSTETRICS AND GYNECOLOGY | Facility: CLINIC | Age: 77
End: 2022-11-30

## 2022-11-30 VITALS
BODY MASS INDEX: 30.22 KG/M2 | SYSTOLIC BLOOD PRESSURE: 144 MMHG | HEIGHT: 64 IN | DIASTOLIC BLOOD PRESSURE: 90 MMHG | WEIGHT: 177 LBS

## 2022-11-30 DIAGNOSIS — N90.4 LICHEN SCLEROSUS OF FEMALE GENITALIA: Primary | ICD-10-CM

## 2022-11-30 DIAGNOSIS — Z78.9 NON-SMOKER: ICD-10-CM

## 2022-11-30 DIAGNOSIS — N89.8 VAGINAL DRYNESS: ICD-10-CM

## 2022-11-30 PROCEDURE — 99214 OFFICE O/P EST MOD 30 MIN: CPT | Performed by: OBSTETRICS & GYNECOLOGY

## 2022-11-30 RX ORDER — CLOBETASOL PROPIONATE 0.5 MG/G
1 CREAM TOPICAL 2 TIMES DAILY
Qty: 15 G | Refills: 5 | Status: SHIPPED | OUTPATIENT
Start: 2022-11-30

## 2022-11-30 RX ORDER — ESTRADIOL 10 UG/1
1 INSERT VAGINAL 2 TIMES WEEKLY
Qty: 8 TABLET | Refills: 4 | Status: SHIPPED | OUTPATIENT
Start: 2022-12-01 | End: 2023-02-28

## 2022-11-30 RX ORDER — ONDANSETRON 4 MG/1
4 TABLET, ORALLY DISINTEGRATING ORAL 4 TIMES DAILY
COMMUNITY
Start: 2022-10-21

## 2022-11-30 NOTE — PROGRESS NOTES
"Chief Complaint  Vaginal Itching (Pt here for 3mo f/u after using clobetasol for lichens sclerosus and reports Clobetasol has improved symptoms but doesn't have to use it often) and Vaginal Dryness (Pt states Dr. Claros was going to prescribe something for vaginal dryness and would like that done today as well as she is still symptomatic)    Subjective        Laura Conner presents to [facility]  History of Present Illness  Patient presents today for follow-up  She reports significant improvement in her lichen sclerosus symptoms  However, she continues to have some vaginal dryness  Vaginal Itching        Objective   Vital Signs:  /90 (BP Location: Left arm, Patient Position: Sitting, Cuff Size: Adult)   Ht 162.6 cm (64\")   Wt 80.3 kg (177 lb)   BMI 30.38 kg/m²   Estimated body mass index is 30.38 kg/m² as calculated from the following:    Height as of this encounter: 162.6 cm (64\").    Weight as of this encounter: 80.3 kg (177 lb).          Physical Exam  Vitals and nursing note reviewed. Exam conducted with a chaperone present.   Constitutional:       Appearance: Normal appearance.   HENT:      Head: Normocephalic and atraumatic.   Abdominal:      General: Abdomen is flat. Bowel sounds are normal.      Palpations: Abdomen is soft.   Musculoskeletal:         General: Normal range of motion.      Cervical back: Normal range of motion and neck supple.   Skin:     General: Skin is warm and dry.   Neurological:      General: No focal deficit present.      Mental Status: She is alert and oriented to person, place, and time. Mental status is at baseline.   Psychiatric:         Mood and Affect: Mood normal.         Behavior: Behavior normal.         Thought Content: Thought content normal.         Judgment: Judgment normal.        Result Review :                Assessment and Plan   Diagnoses and all orders for this visit:    1. Lichen sclerosus of female genitalia (Primary)  -     " clobetasol (Temovate) 0.05 % cream; Apply 1 application topically to the appropriate area as directed 2 (Two) Times a Day.  Dispense: 15 g; Refill: 5    2. Vaginal dryness  -     estradiol (VAGIFEM) 10 MCG tablet vaginal tablet; Insert 1 tablet into the vagina 2 (Two) Times a Week.  Dispense: 8 tablet; Refill: 4    3. Non-smoker             Follow Up   Return in about 6 months (around 5/30/2023) for with me.  Patient was given instructions and counseling regarding her condition or for health maintenance advice. Please see specific information pulled into the AVS if appropriate.   Continue clobetasol  Trial of Vagifem  Call for concerns or questions    Jovanny Claros MD

## 2022-12-01 ENCOUNTER — TELEPHONE (OUTPATIENT)
Dept: OBSTETRICS AND GYNECOLOGY | Facility: CLINIC | Age: 77
End: 2022-12-01

## 2022-12-01 ENCOUNTER — OFFICE VISIT (OUTPATIENT)
Dept: NEUROSURGERY | Age: 77
End: 2022-12-01

## 2022-12-01 ENCOUNTER — HOSPITAL ENCOUNTER (OUTPATIENT)
Dept: GENERAL RADIOLOGY | Age: 77
Discharge: HOME | End: 2022-12-01
Payer: MEDICARE

## 2022-12-01 VITALS
SYSTOLIC BLOOD PRESSURE: 132 MMHG | RESPIRATION RATE: 18 BRPM | HEART RATE: 78 BPM | HEIGHT: 64 IN | BODY MASS INDEX: 31.58 KG/M2 | DIASTOLIC BLOOD PRESSURE: 88 MMHG | WEIGHT: 185 LBS

## 2022-12-01 DIAGNOSIS — Z98.890 S/P KYPHOPLASTY: Primary | ICD-10-CM

## 2022-12-01 DIAGNOSIS — S32.010S COMPRESSION FRACTURE OF L1 VERTEBRA, SEQUELA: ICD-10-CM

## 2022-12-01 DIAGNOSIS — Z98.890 S/P KYPHOPLASTY: ICD-10-CM

## 2022-12-01 PROCEDURE — 99024 POSTOP FOLLOW-UP VISIT: CPT | Performed by: NEUROLOGICAL SURGERY

## 2022-12-01 PROCEDURE — 72100 X-RAY EXAM L-S SPINE 2/3 VWS: CPT

## 2022-12-01 ASSESSMENT — ENCOUNTER SYMPTOMS
GASTROINTESTINAL NEGATIVE: 1
RESPIRATORY NEGATIVE: 1
BACK PAIN: 1
EYES NEGATIVE: 1

## 2022-12-01 NOTE — PROGRESS NOTES
NEUROSURGERY POSTOPERATIVE FOLLOW UP NOTE      Chief Complaint:   Chief Complaint   Patient presents with    Follow-up     Patient is here for a one month follow up for back pain and states that her back pain has improved since last office visit. She states that home health has been coming to her house and helping. Results     XR Lumbar 12/1/22         Date of Surgery: 10/17/2022    Procedure Performed: L1 kyphoplasty      Interval Update:     Planned follow-up visit after surgery. She is doing remarkably well. Her back pain has essentially resolved. She denies any radicular pain, weakness or numbness in her legs. HPI:     The patient is a 65 y.o. female who presented to the [facility] ED on 10/13/2022 complaining of weakness and back pain. She has been suffering from a flare-up of diverticulitis recently with significant diarrhea and abdominal pain. She states she stood up in her bathroom and felt like she was going to pass out and sat down on her backside. She does not feel that she fell or had a significant impact but she did experience sudden severe back pain afterwards. She was evaluated in the ED and a CT scan of her abdomen/pelvis was obtained and this revealed findings concerning for diverticulits as well as small bowel inflammation and also demonstrated an L1 compression fracture. She has been admitted to the medical service and I have been asked to provide neurosurgical consultation regarding the compression fracture. On questioning, she continues to endorse severe back pain with movement. She denies any lower extremity radicular pain, focal weakness or sensory loss. She denies loss of bowel or bladder control. Of note, she underwent a DEXA scan in 7/2022 that revealed osteopenia with a T-score of -1.2.        Objective:    /88   Pulse 78   Resp 18   Ht 5' 4\" (1.626 m)   Wt 185 lb (83.9 kg)   BMI 31.76 kg/m²         Physical Exam:    General: alert, cooperative, no distress  Cardiorespiratory: unlabored breathing  Wound: Healing well      Neurologic Exam:    Mental Status: Alert, oriented, thought content appropriate  Cranial Nerves: PERRL, EOMI, symmetric facies, tongue midline  Motor: Motor exam is symmetrical 5 out of 5 all extremities bilaterally  Somatosensory: normal light touch sensation      Imaging:  Standing x-rays of the lumbar spine reviewed and compared to prior studies. There are expected postoperative changes after L1 kyphoplasty. No postoperative complications identified. There is levoscoliosis of the lumbar spine. Assessment and Plan:  65 y.o. F s/p L1 kyphoplasty on 10/17/2022 for a traumatic L1 compression fracture causing intractable back pain.      -Doing well postoperatively, x-rays stable and pain has resolved  -OK to DC LSO brace  -Begin outpatient physical therapy  -Will plan to follow up in three months with repeat standing lumbar x-rays.         Electronically signed by Olive Duran MD on 12/1/2022 at 11:34 AM

## 2022-12-01 NOTE — TELEPHONE ENCOUNTER
PA approved for Yuvafem. Approval letter states approval good from 1/1/22 to 12/1/23 as long as patient remains covered under current plan. Approval letter faxed to pharmacy and scanned into chart. Left VM and sent [employer] message.

## 2023-01-05 RX ORDER — OMEPRAZOLE 40 MG/1
CAPSULE, DELAYED RELEASE ORAL
Qty: 90 CAPSULE | Refills: 3 | Status: SHIPPED | OUTPATIENT
Start: 2023-01-05

## 2023-02-26 DIAGNOSIS — N89.8 VAGINAL DRYNESS: ICD-10-CM

## 2023-03-01 ENCOUNTER — OFFICE VISIT (OUTPATIENT)
Dept: NEUROSURGERY | Age: 78
End: 2023-03-01
Payer: MEDICARE

## 2023-03-01 ENCOUNTER — HOSPITAL ENCOUNTER (OUTPATIENT)
Dept: GENERAL RADIOLOGY | Age: 78
Discharge: HOME | End: 2023-03-01
Payer: MEDICARE

## 2023-03-01 VITALS
HEIGHT: 64 IN | DIASTOLIC BLOOD PRESSURE: 65 MMHG | HEART RATE: 77 BPM | BODY MASS INDEX: 31.58 KG/M2 | RESPIRATION RATE: 18 BRPM | WEIGHT: 185 LBS | SYSTOLIC BLOOD PRESSURE: 110 MMHG

## 2023-03-01 DIAGNOSIS — S32.010S COMPRESSION FRACTURE OF L1 VERTEBRA, SEQUELA: ICD-10-CM

## 2023-03-01 DIAGNOSIS — Z98.890 S/P KYPHOPLASTY: ICD-10-CM

## 2023-03-01 DIAGNOSIS — Z98.890 S/P KYPHOPLASTY: Primary | ICD-10-CM

## 2023-03-01 PROCEDURE — G8400 PT W/DXA NO RESULTS DOC: HCPCS | Performed by: NEUROLOGICAL SURGERY

## 2023-03-01 PROCEDURE — 1090F PRES/ABSN URINE INCON ASSESS: CPT | Performed by: NEUROLOGICAL SURGERY

## 2023-03-01 PROCEDURE — 3074F SYST BP LT 130 MM HG: CPT | Performed by: NEUROLOGICAL SURGERY

## 2023-03-01 PROCEDURE — 99213 OFFICE O/P EST LOW 20 MIN: CPT | Performed by: NEUROLOGICAL SURGERY

## 2023-03-01 PROCEDURE — 72100 X-RAY EXAM L-S SPINE 2/3 VWS: CPT

## 2023-03-01 PROCEDURE — 1036F TOBACCO NON-USER: CPT | Performed by: NEUROLOGICAL SURGERY

## 2023-03-01 PROCEDURE — 1123F ACP DISCUSS/DSCN MKR DOCD: CPT | Performed by: NEUROLOGICAL SURGERY

## 2023-03-01 PROCEDURE — G8484 FLU IMMUNIZE NO ADMIN: HCPCS | Performed by: NEUROLOGICAL SURGERY

## 2023-03-01 PROCEDURE — G8417 CALC BMI ABV UP PARAM F/U: HCPCS | Performed by: NEUROLOGICAL SURGERY

## 2023-03-01 PROCEDURE — G8427 DOCREV CUR MEDS BY ELIG CLIN: HCPCS | Performed by: NEUROLOGICAL SURGERY

## 2023-03-01 PROCEDURE — 3078F DIAST BP <80 MM HG: CPT | Performed by: NEUROLOGICAL SURGERY

## 2023-03-01 RX ORDER — ESTRADIOL 10 UG/1
TABLET VAGINAL
Qty: 24 TABLET | Refills: 2 | Status: SHIPPED | OUTPATIENT
Start: 2023-03-01

## 2023-03-01 RX ORDER — DAPAGLIFLOZIN 10 MG/1
TABLET, FILM COATED ORAL
COMMUNITY
Start: 2023-01-30

## 2023-03-01 ASSESSMENT — ENCOUNTER SYMPTOMS
RESPIRATORY NEGATIVE: 1
EYES NEGATIVE: 1
BACK PAIN: 1
GASTROINTESTINAL NEGATIVE: 1

## 2023-03-01 NOTE — PROGRESS NOTES
NEUROSURGERY POSTOPERATIVE FOLLOW UP NOTE      Chief Complaint:   Chief Complaint   Patient presents with    Follow-up     Patient is here to follow up after surgery on 10/17/22 and states that surgery was successful but now she is more aware of pain in her upper back. She states that the pain is between her shoulders but she has had this for years. Results     XR 3/1/23          Date of Surgery: 10/17/2022    Procedure Performed: L1 kyphoplasty      Interval Update:     Planned follow-up visit after surgery. She is doing well. The pain from her compression fracture has resolved. She is complaining of soreness in her upper-thoracic spine that is is currently treating with heat and Tylenol. She does have moderate scoliosis that was noted on previous x-rays. HPI:     The patient is a 65 y.o. female who presented to the [facility] ED on 10/13/2022 complaining of weakness and back pain. She has been suffering from a flare-up of diverticulitis recently with significant diarrhea and abdominal pain. She states she stood up in her bathroom and felt like she was going to pass out and sat down on her backside. She does not feel that she fell or had a significant impact but she did experience sudden severe back pain afterwards. She was evaluated in the ED and a CT scan of her abdomen/pelvis was obtained and this revealed findings concerning for diverticulits as well as small bowel inflammation and also demonstrated an L1 compression fracture. She has been admitted to the medical service and I have been asked to provide neurosurgical consultation regarding the compression fracture. On questioning, she continues to endorse severe back pain with movement. She denies any lower extremity radicular pain, focal weakness or sensory loss. She denies loss of bowel or bladder control. Of note, she underwent a DEXA scan in 7/2022 that revealed osteopenia with a T-score of -1.2.        Review of Systems:    Constitutional: Negative. HENT: Negative. Eyes: Negative. Respiratory: Negative. Cardiovascular: Negative. Gastrointestinal: Negative. Genitourinary: Negative. Musculoskeletal:  Positive for back pain, joint pain and myalgias. Skin: Negative. Neurological: Negative. Endo/Heme/Allergies: Negative. Psychiatric/Behavioral: Negative. ROS was obtained by the medical assistant and reviewed by myself      Objective:    /65   Pulse 77   Resp 18   Ht 5' 4\" (1.626 m)   Wt 185 lb (83.9 kg)   BMI 31.76 kg/m²         Physical Exam:    General: alert, cooperative, no distress  Cardiorespiratory: unlabored breathing  Wound: Healed      Neurologic Exam:    Mental Status: Alert, oriented, thought content appropriate  Cranial Nerves: PERRL, EOMI, symmetric facies, tongue midline  Motor: Motor exam is symmetrical 5 out of 5 all extremities bilaterally  Somatosensory: normal light touch sensation      Imaging:  Standing x-rays of the lumbar spine reviewed and compared to prior studies. There are expected postoperative changes after L1 kyphoplasty. No postoperative complications identified. There is levoscoliosis of the lumbar spine. Assessment and Plan:  65 y.o. F s/p L1 kyphoplasty on 10/17/2022 for a traumatic L1 compression fracture causing intractable back pain.   Her severe pain has resolved since surgery.    -Patient doing well postoperatively  -OK to resume normal activity  -I encourage the patient to remain active and continue OTC tylenol, heat, etc for her upper thoracic back pain.  -Follow up PRN with any future concerns        Electronically signed by Carlos Soriano MD on 3/1/2023 at 10:57 AM

## 2023-04-07 ENCOUNTER — APPOINTMENT (OUTPATIENT)
Dept: ULTRASOUND IMAGING | Facility: HOSPITAL | Age: 78
End: 2023-04-07

## 2023-04-27 ENCOUNTER — TRANSCRIBE ORDERS (OUTPATIENT)
Dept: ADMINISTRATIVE | Facility: HOSPITAL | Age: 78
End: 2023-04-27
Payer: MEDICARE

## 2023-04-27 DIAGNOSIS — M81.0 SENILE OSTEOPOROSIS: Primary | ICD-10-CM

## 2023-05-12 ENCOUNTER — INFUSION (OUTPATIENT)
Dept: ONCOLOGY | Facility: HOSPITAL | Age: 78
End: 2023-05-12
Payer: MEDICARE

## 2023-05-12 ENCOUNTER — LAB (OUTPATIENT)
Dept: LAB | Facility: HOSPITAL | Age: 78
End: 2023-05-12
Payer: MEDICARE

## 2023-05-12 VITALS
TEMPERATURE: 98.2 F | HEART RATE: 59 BPM | RESPIRATION RATE: 18 BRPM | SYSTOLIC BLOOD PRESSURE: 143 MMHG | OXYGEN SATURATION: 96 % | DIASTOLIC BLOOD PRESSURE: 65 MMHG

## 2023-05-12 DIAGNOSIS — M81.0 AGE-RELATED OSTEOPOROSIS WITHOUT CURRENT PATHOLOGICAL FRACTURE: Primary | ICD-10-CM

## 2023-05-12 DIAGNOSIS — M81.0 SENILE OSTEOPOROSIS: ICD-10-CM

## 2023-05-12 LAB
25(OH)D3 SERPL-MCNC: 48.3 NG/ML (ref 30–100)
MAGNESIUM SERPL-MCNC: 2 MG/DL (ref 1.6–2.4)
PHOSPHATE SERPL-MCNC: 4.3 MG/DL (ref 2.5–4.5)

## 2023-05-12 PROCEDURE — 83735 ASSAY OF MAGNESIUM: CPT

## 2023-05-12 PROCEDURE — 96372 THER/PROPH/DIAG INJ SC/IM: CPT

## 2023-05-12 PROCEDURE — 25010000002 DENOSUMAB 60 MG/ML SOLUTION PREFILLED SYRINGE: Performed by: NURSE PRACTITIONER

## 2023-05-12 PROCEDURE — 36415 COLL VENOUS BLD VENIPUNCTURE: CPT

## 2023-05-12 PROCEDURE — 84100 ASSAY OF PHOSPHORUS: CPT

## 2023-05-12 PROCEDURE — 82306 VITAMIN D 25 HYDROXY: CPT

## 2023-05-12 RX ADMIN — DENOSUMAB 60 MG: 60 INJECTION SUBCUTANEOUS at 10:38

## 2023-05-31 ENCOUNTER — OFFICE VISIT (OUTPATIENT)
Dept: OBSTETRICS AND GYNECOLOGY | Facility: CLINIC | Age: 78
End: 2023-05-31

## 2023-05-31 VITALS
WEIGHT: 169 LBS | DIASTOLIC BLOOD PRESSURE: 60 MMHG | BODY MASS INDEX: 28.85 KG/M2 | HEIGHT: 64 IN | SYSTOLIC BLOOD PRESSURE: 104 MMHG

## 2023-05-31 DIAGNOSIS — N81.11 CYSTOCELE, MIDLINE: ICD-10-CM

## 2023-05-31 DIAGNOSIS — N32.81 OAB (OVERACTIVE BLADDER): ICD-10-CM

## 2023-05-31 DIAGNOSIS — N89.8 VAGINAL DRYNESS: Primary | ICD-10-CM

## 2023-05-31 DIAGNOSIS — N90.4 LICHEN SCLEROSUS OF FEMALE GENITALIA: ICD-10-CM

## 2023-05-31 DIAGNOSIS — Z78.9 NON-SMOKER: ICD-10-CM

## 2023-05-31 RX ORDER — DEXAMETHASONE 0.5 MG/5ML
1 SOLUTION ORAL 2 TIMES DAILY
COMMUNITY
Start: 2023-02-09

## 2023-05-31 RX ORDER — DAPAGLIFLOZIN 10 MG/1
1 TABLET, FILM COATED ORAL DAILY
COMMUNITY

## 2023-05-31 RX ORDER — CLOBETASOL PROPIONATE 0.5 MG/G
1 CREAM TOPICAL 2 TIMES DAILY
Qty: 15 G | Refills: 5 | Status: SHIPPED | OUTPATIENT
Start: 2023-05-31

## 2023-05-31 RX ORDER — DENOSUMAB 60 MG/ML
INJECTION SUBCUTANEOUS ONCE
COMMUNITY

## 2023-05-31 NOTE — PROGRESS NOTES
"Chief Complaint  Follow-up (Pt here for 6mo f/u on lichens sclerosus and has been using Clobetasol with good relief but only within the last two days, pt states she has d/c Vagifem as lichens has been so bad she wasn't as concerned with dryness) and Bladder Prolapse (Pt would also like to see if her bladder has dropped)    Subjective        Laura Conner presents to [facility]  History of Present Illness    Patient presents today to follow-up on lichen sclerosus  She continues to use Temovate as needed with good results  Overall, she is pleased    She does have concerns over a possible prolapsed bladder  Symptoms seem to be worse at the end of a busy day  Whenever she applies her Temovate she notices a large bulge which can be uncomfortable  She also has some overactive bladder and urge incontinence symptoms as well  She is status post retropubic sling procedure for stress incontinence and she denies stress incontinence symptoms at this time    Objective   Vital Signs:  /60 (BP Location: Left arm, Patient Position: Sitting, Cuff Size: Adult)   Ht 162.6 cm (64\")   Wt 76.7 kg (169 lb)   BMI 29.01 kg/m²   Estimated body mass index is 29.01 kg/m² as calculated from the following:    Height as of this encounter: 162.6 cm (64\").    Weight as of this encounter: 76.7 kg (169 lb).             Physical Exam  Vitals and nursing note reviewed. Exam conducted with a chaperone present.   Constitutional:       Appearance: Normal appearance.   HENT:      Head: Normocephalic and atraumatic.   Abdominal:      General: Abdomen is flat. Bowel sounds are normal.      Palpations: Abdomen is soft.   Genitourinary:     Comments: Introitus without obvious evidence of lichen's disease.  Mild to moderate cystocele with straining.  Musculoskeletal:         General: Normal range of motion.   Skin:     General: Skin is warm and dry.   Neurological:      General: No focal deficit present.      Mental Status: She " is alert and oriented to person, place, and time. Mental status is at baseline.   Psychiatric:         Mood and Affect: Mood normal.         Behavior: Behavior normal.         Thought Content: Thought content normal.         Judgment: Judgment normal.      Result Review :                   Assessment and Plan   Diagnoses and all orders for this visit:    1. Vaginal dryness (Primary)    2. Lichen sclerosus of female genitalia  -     clobetasol (Temovate) 0.05 % cream; Apply 1 application topically to the appropriate area as directed 2 (Two) Times a Day.  Dispense: 15 g; Refill: 5    3. Cystocele, midline    4. OAB (overactive bladder)    5. Non-smoker             Follow Up   Return for appt with [place] for consideration of surgery for cystocele.  Patient was given instructions and counseling regarding her condition or for health maintenance advice. Please see specific information pulled into the AVS if appropriate.   Patient desires surgical referral for consideration of repair  We did discuss anticholinergic medicines for the symptoms of overactive bladder but she is not interested in medications at this time    Jovanny Claros MD

## 2023-08-24 ENCOUNTER — TRANSCRIBE ORDERS (OUTPATIENT)
Dept: ADMINISTRATIVE | Facility: HOSPITAL | Age: 78
End: 2023-08-24
Payer: MEDICARE

## 2023-08-24 DIAGNOSIS — Z12.31 ENCOUNTER FOR SCREENING MAMMOGRAM FOR MALIGNANT NEOPLASM OF BREAST: Primary | ICD-10-CM

## 2023-09-27 NOTE — PROGRESS NOTES
Subjective    Ms. Conner is 78 y.o. female    Chief Complaint: urinary incontinence    History of Present Illness    78-year-old female new patient referred for bothersome lower urinary tract symptoms consisting of urinary frequency, urgency and incontinence.  Patient reports is of a minimum of 3 times nightly to urinate.  Reports urinary leakage with coughing, laughing, sneezing and position change as well as occasional leakage with inability to make it to the bathroom in time.  Is reporting a vaginal bulge felt during bathing and wiping as well as constipation for which patient states that she has attempted to move the bulge which has helped the stool evacuate.  Prior history of bladder sling procedure by Dr. Caceres in 2012 and total hysterectomy many years ago.  Previously tried anticholinergics for OAB however was unable to tolerate due to extreme dry eyes and dry mouth.    The following portions of the patient's history were reviewed and updated as appropriate: allergies, current medications, past family history, past medical history, past social history, past surgical history and problem list.    Review of Systems   Constitutional:  Negative for chills and fever.   Gastrointestinal:  Negative for abdominal pain, anal bleeding, blood in stool, nausea and vomiting.   Genitourinary:  Positive for frequency and urgency. Negative for decreased urine volume, difficulty urinating, dysuria, flank pain, hematuria, pelvic pain and vaginal pain.       Current Outpatient Medications:     allopurinol (ZYLOPRIM) 100 MG tablet, Take 1 tablet by mouth Daily., Disp: , Rfl:     Cholecalciferol 50 MCG (2000 UT) tablet, Daily., Disp: , Rfl:     clobetasol (Temovate) 0.05 % cream, Apply 1 application topically to the appropriate area as directed 2 (Two) Times a Day., Disp: 15 g, Rfl: 5    Cranberry 500 MG capsule, Take  by mouth., Disp: , Rfl:     cyclobenzaprine (FLEXERIL) 10 MG tablet, Take 1 tablet by mouth 3 (Three) Times a  Day As Needed for Muscle Spasms., Disp: 90 tablet, Rfl: 1    denosumab (Prolia) 60 MG/ML solution prefilled syringe syringe, Inject  under the skin into the appropriate area as directed 1 (One) Time., Disp: , Rfl:     dexamethasone 0.5 MG/5ML solution, Take 10 mL by mouth 2 (Two) Times a Day., Disp: , Rfl:     Farxiga 10 MG tablet, Take 10 mg by mouth Daily., Disp: , Rfl:     olmesartan-hydrochlorothiazide (BENICAR HCT) 40-12.5 MG per tablet, Take 1 tablet by mouth Daily., Disp: , Rfl:     simvastatin (ZOCOR) 20 MG tablet, Take 1 tablet by mouth Every Night., Disp: , Rfl:     Synthroid 75 MCG tablet, Take 1 tablet by mouth Daily., Disp: , Rfl:     Past Medical History:   Diagnosis Date    Matos's esophagus     Breast cancer 1984    left breast cancer    Gout     H/O acute renal failure     due to antibiotic    Hypercholesteremia     Hypertension     Hypertriglyceridemia     Hypothyroid     Kidney cyst, acquired     Leg edema     Osteoarthritis     Venous insufficiency        Past Surgical History:   Procedure Laterality Date    AUGMENTATION MAMMAPLASTY  1984    jean-pierre mastectomy, left br cancer 1984 w/ jean-pierre reconstruction    AUGMENTATION MAMMAPLASTY      replaced in 2007.    BACK SURGERY  10/17/2022    BLADDER SUSPENSION      BREAST BIOPSY      BREAST IMPLANT SURGERY      BUNIONECTOMY  10/2021    CATARACT EXTRACTION, BILATERAL      ESOPHAGUS SURGERY      stretched    FOOT SURGERY      HYSTERECTOMY      MASTECTOMY Bilateral 1984    lymph nodes removed on left    OOPHORECTOMY      THYROIDECTOMY, PARTIAL      TONSILLECTOMY      TOTAL KNEE ARTHROPLASTY Left     VARICOSE VEIN SURGERY Left 11/05/2018    Procedure: LEFT SAPHENOUS VEIN RADIO FREQUENCY ABLATION;  Surgeon: Maco Bentley DO;  Location: [facility] OR ;  Service: Vascular    VARICOSE VEIN SURGERY Right 11/19/2018    Procedure: RIGHT SAPHENOUS VEIN RADIO FREQUENCY ABLATION;  Surgeon: Maco Bentley DO;  Location: [facility] HYBRID OR 12;  Service:  "Vascular       Social History     Socioeconomic History    Marital status:    Tobacco Use    Smoking status: Never    Smokeless tobacco: Never   Vaping Use    Vaping Use: Never used   Substance and Sexual Activity    Alcohol use: No    Drug use: No    Sexual activity: Yes     Partners: Male     Birth control/protection: Surgical       Family History   Problem Relation Age of Onset    Breast cancer Sister     Bone cancer Sister     Stroke Sister     Heart failure Mother     Diabetes Mother     Arthritis Mother     Heart disease Father     Stroke Father     Prostate cancer Father     Aneurysm Father     Lung cancer Brother     Diabetes Brother     Heart disease Sister     Parkinsonism Sister     Stroke Paternal Grandmother        Objective    Temp 97.7 °F (36.5 °C)   Ht 162.6 cm (64.02\")   Wt 77.2 kg (170 lb 3.2 oz)   LMP  (LMP Unknown)   BMI 29.20 kg/m²     Physical Exam  Nursing note reviewed.   Constitutional:       General: She is not in acute distress.     Appearance: Normal appearance. She is not ill-appearing.   HENT:      Nose: No congestion.   Abdominal:      Tenderness: There is no right CVA tenderness or left CVA tenderness.      Hernia: No hernia is present.   Skin:     General: Skin is warm and dry.   Neurological:      Mental Status: She is alert and oriented to person, place, and time.   Psychiatric:         Mood and Affect: Mood normal.         Behavior: Behavior normal.         Estimation of residual urine via abdominal ultrasound  Residual Urine: 44 ml  Indication: Urinary Incontinence  Position: Supine  Examination: Incremental scanning of the suprapubic area using 3 MHz transducer using copious amounts of acoustic gel.   Findings: An anechoic area was demonstrated which represented the bladder, with measurement of residual urine as noted. I inspected this myself. In that the residual urine was stable or insignificant, no treatment will be necessary at this time.          Assessment and " Plan    Diagnoses and all orders for this visit:    1. Mixed incontinence (Primary)  -     POC Urinalysis Dipstick, Multipro      78-year-old female new patient referred for bothersome lower urinary tract symptoms consisting of urinary frequency, urgency and incontinence.      Sounds as though patient is experiencing a reprolapse of the bladder or rectum-recommend further evaluation with OB/GYN for possible pessary versus other surgical option.    In regards to the overactive bladder frequency urgency and urge leakage would like to try patient on Myrbetriq 50 mg daily we will provide patient with a 2-month sample pack.  Patient previously unable to tolerate anticholinergics due to extreme side effects and would also not recommend anticholinergics due to advanced age as can lead to cognitive impairment    Recommend follow-up in 2 months

## 2023-09-29 ENCOUNTER — OFFICE VISIT (OUTPATIENT)
Dept: UROLOGY | Facility: CLINIC | Age: 78
End: 2023-09-29
Payer: MEDICARE

## 2023-09-29 VITALS — WEIGHT: 170.2 LBS | BODY MASS INDEX: 29.06 KG/M2 | HEIGHT: 64 IN | TEMPERATURE: 97.7 F

## 2023-09-29 DIAGNOSIS — N39.46 MIXED INCONTINENCE: Primary | ICD-10-CM

## 2023-09-29 LAB
BILIRUB BLD-MCNC: NEGATIVE MG/DL
CLARITY, POC: CLEAR
COLOR UR: YELLOW
GLUCOSE UR STRIP-MCNC: ABNORMAL MG/DL
KETONES UR QL: NEGATIVE
LEUKOCYTE EST, POC: NEGATIVE
NITRITE UR-MCNC: NEGATIVE MG/ML
PH UR: 6.5 [PH] (ref 5–8)
PROT UR STRIP-MCNC: NEGATIVE MG/DL
RBC # UR STRIP: NEGATIVE /UL
SP GR UR: 1.01 (ref 1–1.03)
UROBILINOGEN UR QL: ABNORMAL

## 2023-09-29 RX ORDER — PYRIDOXINE HCL (VITAMIN B6) 100 MG
TABLET ORAL
COMMUNITY

## 2023-11-15 NOTE — PROGRESS NOTES
Subjective    Ms. Conner is 78 y.o. female    Chief Complaint: incontinence    History of Present Illness    78-year-old female in for follow up for bothersome lower urinary tract symptoms consisting of urinary frequency, urgency and incontinence.  Was tried on Myrbetriq with minimal improvement in symptoms reports occasional improvement in nighttime urination however is still awakening at least twice nightly most x 3 with no noted improvement through the day.  Continues to report urge leakage as well as worsening leakage with position change, bending over and is reporting a worsening hood with trying to have a bowel movement.  Patient stating she is not constipated however is really having to bear down as she feels there is a protrusion and something keeping her from having a BM.  Patient is concerned that she has had a reprolapse of either her bladder or rectum.  Patient with a history of bladder sling repair by Dr. Caceres in 2012. reporting a vaginal bulge felt during bathing and wiping as well as constipation for which patient states that she has attempted to move the bulge which has helped the stool evacuate.   total hysterectomy many years ago.  Previously tried anticholinergics for OAB however was unable to tolerate due to extreme dry eyes and dry mouth.     The following portions of the patient's history were reviewed and updated as appropriate: allergies, current medications, past family history, past medical history, past social history, past surgical history and problem list.    Review of Systems   Constitutional:  Negative for chills and fever.   Gastrointestinal:  Positive for rectal pain. Negative for abdominal pain, anal bleeding and blood in stool.   Genitourinary:  Positive for frequency and urgency. Negative for difficulty urinating, dysuria, flank pain, hematuria and pelvic pain.         Current Outpatient Medications:     allopurinol (ZYLOPRIM) 100 MG tablet, Take 1 tablet by mouth Daily.,  Disp: , Rfl:     Cholecalciferol 50 MCG (2000 UT) tablet, Daily., Disp: , Rfl:     clobetasol (Temovate) 0.05 % cream, Apply 1 application topically to the appropriate area as directed 2 (Two) Times a Day., Disp: 15 g, Rfl: 5    Cranberry 500 MG capsule, Take  by mouth., Disp: , Rfl:     cyclobenzaprine (FLEXERIL) 10 MG tablet, Take 1 tablet by mouth 3 (Three) Times a Day As Needed for Muscle Spasms., Disp: 90 tablet, Rfl: 1    dexamethasone 0.5 MG/5ML solution, Take 10 mL by mouth 2 (Two) Times a Day., Disp: , Rfl:     Farxiga 10 MG tablet, Take 10 mg by mouth Daily., Disp: , Rfl:     olmesartan-hydrochlorothiazide (BENICAR HCT) 40-12.5 MG per tablet, Take 1 tablet by mouth Daily., Disp: , Rfl:     simvastatin (ZOCOR) 20 MG tablet, Take 1 tablet by mouth Every Night., Disp: , Rfl:     Synthroid 75 MCG tablet, Take 1 tablet by mouth Daily., Disp: , Rfl:     Past Medical History:   Diagnosis Date    Matos's esophagus     Breast cancer 1984    left breast cancer    Gout     H/O acute renal failure     due to antibiotic    Hypercholesteremia     Hypertension     Hypertriglyceridemia     Hypothyroid     Kidney cyst, acquired     Leg edema     Osteoarthritis     Venous insufficiency        Past Surgical History:   Procedure Laterality Date    AUGMENTATION MAMMAPLASTY  1984    jean-pierre mastectomy, left br cancer 1984 w/ jean-pierre reconstruction    AUGMENTATION MAMMAPLASTY      replaced in 2007.    BACK SURGERY  10/17/2022    BLADDER SUSPENSION      BREAST BIOPSY      BREAST IMPLANT SURGERY      BUNIONECTOMY  10/2021    CATARACT EXTRACTION, BILATERAL      ESOPHAGUS SURGERY      stretched    FOOT SURGERY      HYSTERECTOMY      MASTECTOMY Bilateral 1984    lymph nodes removed on left    OOPHORECTOMY      THYROIDECTOMY, PARTIAL      TONSILLECTOMY      TOTAL KNEE ARTHROPLASTY Left     VARICOSE VEIN SURGERY Left 11/05/2018    Procedure: LEFT SAPHENOUS VEIN RADIO FREQUENCY ABLATION;  Surgeon: Maco Bentley DO;  Location: [facility]  "HYBRID OR 12;  Service: Vascular    VARICOSE VEIN SURGERY Right 11/19/2018    Procedure: RIGHT SAPHENOUS VEIN RADIO FREQUENCY ABLATION;  Surgeon: Maco Bentley DO;  Location: [facility] HYBRID OR 12;  Service: Vascular       Social History     Socioeconomic History    Marital status:    Tobacco Use    Smoking status: Never    Smokeless tobacco: Never   Vaping Use    Vaping Use: Never used   Substance and Sexual Activity    Alcohol use: No    Drug use: No    Sexual activity: Yes     Partners: Male     Birth control/protection: Surgical       Family History   Problem Relation Age of Onset    Breast cancer Sister     Bone cancer Sister     Stroke Sister     Heart failure Mother     Diabetes Mother     Arthritis Mother     Heart disease Father     Stroke Father     Prostate cancer Father     Aneurysm Father     Lung cancer Brother     Diabetes Brother     Heart disease Sister     Parkinsonism Sister     Stroke Paternal Grandmother        Objective    Temp 97.5 °F (36.4 °C)   Ht 162.6 cm (64.02\")   Wt 76 kg (167 lb 9.6 oz)   LMP  (LMP Unknown)   BMI 28.75 kg/m²     Physical Exam  Nursing note reviewed.   Constitutional:       General: She is not in acute distress.     Appearance: Normal appearance. She is not ill-appearing.   HENT:      Nose: No congestion.   Abdominal:      Tenderness: There is no right CVA tenderness or left CVA tenderness.      Hernia: No hernia is present.   Skin:     General: Skin is warm and dry.   Neurological:      Mental Status: She is alert and oriented to person, place, and time.   Psychiatric:         Mood and Affect: Mood normal.         Behavior: Behavior normal.           Results for orders placed or performed in visit on 11/30/23   POC Urinalysis Dipstick, Multipro    Specimen: Urine   Result Value Ref Range    Color Yellow Yellow, Straw, Dark Yellow, Yue    Clarity, UA Clear Clear    Glucose,  mg/dL (A) Negative mg/dL    Bilirubin Negative Negative    Ketones, UA " Negative Negative    Specific Gravity  1.015 1.005 - 1.030    Blood, UA Negative Negative    pH, Urine 7.0 5.0 - 8.0    Protein, POC Negative Negative mg/dL    Urobilinogen, UA 0.2 E.U./dL Normal, 0.2 E.U./dL    Nitrite, UA Negative Negative    Leukocytes Negative Negative   Estimation of residual urine via abdominal ultrasound  Residual Urine: 64 ml  Indication: incontinence  Position: Supine  Examination: Incremental scanning of the suprapubic area using 3 MHz transducer using copious amounts of acoustic gel.   Findings: An anechoic area was demonstrated which represented the bladder, with measurement of residual urine as noted. I inspected this myself. In that the residual urine was stable or insignificant, no treatment will be necessary at this time.      Assessment and Plan    Diagnoses and all orders for this visit:    1. Mixed incontinence (Primary)  -     POC Urinalysis Dipstick, Multipro  -     Ambulatory Referral to Obstetrics / Gynecology      No improvement noted with Myrbetriq-will stop    Have offered a trial of Gemtesa however patient would like to hold off at this time patient states she would really like to just go ahead and see OB/GYN to try to resolve the prolapse issue and the stress leakage as well as the bowel problem.  Once evaluated by OB/GYN patient states she may consider further options including Botox or Axonics sacral neurostimulator as patient states she really would like to try to get off of any overactive bladder medication as they are too expensive is unable to tolerate anticholinergics.     Recommend follow-up in 3 months

## 2023-11-21 ENCOUNTER — TRANSCRIBE ORDERS (OUTPATIENT)
Dept: ADMINISTRATIVE | Facility: HOSPITAL | Age: 78
End: 2023-11-21
Payer: MEDICARE

## 2023-11-30 ENCOUNTER — OFFICE VISIT (OUTPATIENT)
Dept: UROLOGY | Facility: CLINIC | Age: 78
End: 2023-11-30
Payer: MEDICARE

## 2023-11-30 VITALS — HEIGHT: 64 IN | WEIGHT: 167.6 LBS | TEMPERATURE: 97.5 F | BODY MASS INDEX: 28.61 KG/M2

## 2023-11-30 DIAGNOSIS — N39.46 MIXED INCONTINENCE: Primary | ICD-10-CM

## 2023-11-30 LAB
BILIRUB BLD-MCNC: NEGATIVE MG/DL
CLARITY, POC: CLEAR
COLOR UR: YELLOW
GLUCOSE UR STRIP-MCNC: ABNORMAL MG/DL
KETONES UR QL: NEGATIVE
LEUKOCYTE EST, POC: NEGATIVE
NITRITE UR-MCNC: NEGATIVE MG/ML
PH UR: 7 [PH] (ref 5–8)
PROT UR STRIP-MCNC: NEGATIVE MG/DL
RBC # UR STRIP: NEGATIVE /UL
SP GR UR: 1.01 (ref 1–1.03)
UROBILINOGEN UR QL: ABNORMAL

## 2023-12-07 LAB — NCCN CRITERIA FLAG: ABNORMAL

## 2023-12-08 ENCOUNTER — HOSPITAL ENCOUNTER (OUTPATIENT)
Dept: MAMMOGRAPHY | Facility: HOSPITAL | Age: 78
Discharge: HOME | End: 2023-12-08
Admitting: NURSE PRACTITIONER
Payer: MEDICARE

## 2023-12-08 DIAGNOSIS — Z12.31 ENCOUNTER FOR SCREENING MAMMOGRAM FOR MALIGNANT NEOPLASM OF BREAST: ICD-10-CM

## 2023-12-08 PROCEDURE — 77063 BREAST TOMOSYNTHESIS BI: CPT

## 2023-12-08 PROCEDURE — 77067 SCR MAMMO BI INCL CAD: CPT

## 2024-03-21 ENCOUNTER — OFFICE VISIT (OUTPATIENT)
Dept: OBSTETRICS AND GYNECOLOGY | Age: 79
End: 2024-03-21
Payer: MEDICARE

## 2024-03-21 VITALS
DIASTOLIC BLOOD PRESSURE: 62 MMHG | BODY MASS INDEX: 27.83 KG/M2 | HEIGHT: 64 IN | SYSTOLIC BLOOD PRESSURE: 136 MMHG | WEIGHT: 163 LBS

## 2024-03-21 DIAGNOSIS — R35.0 URINARY FREQUENCY: Primary | ICD-10-CM

## 2024-03-21 DIAGNOSIS — R32 URINARY INCONTINENCE, UNSPECIFIED TYPE: ICD-10-CM

## 2024-03-21 DIAGNOSIS — N81.6 RECTOCELE: ICD-10-CM

## 2024-03-21 DIAGNOSIS — Z90.710 S/P HYSTERECTOMY: ICD-10-CM

## 2024-03-21 DIAGNOSIS — N81.11 CYSTOCELE, MIDLINE: ICD-10-CM

## 2024-03-21 PROCEDURE — 99214 OFFICE O/P EST MOD 30 MIN: CPT | Performed by: OBSTETRICS & GYNECOLOGY

## 2024-03-21 PROCEDURE — 3078F DIAST BP <80 MM HG: CPT | Performed by: OBSTETRICS & GYNECOLOGY

## 2024-03-21 PROCEDURE — 3075F SYST BP GE 130 - 139MM HG: CPT | Performed by: OBSTETRICS & GYNECOLOGY

## 2024-03-21 RX ORDER — LEVOTHYROXINE SODIUM 88 MCG
1 TABLET ORAL DAILY
COMMUNITY
Start: 2024-02-19

## 2024-03-21 NOTE — PROGRESS NOTES
"Subjective     Chief Complaint   Patient presents with    Bladder Prolapse     Pt here today with c/o prolapse. Pt voices going to the bathroom often. Pt voices having trouble passing stool. Pt voices no other concerns.        Laura Conner is a 78 y.o. year old who presents to be seen for urinary incontinence and frequency.  Patient says that she voids every 30-60 minutes. When asked about urgency, patient responds \" I just go, there is no holding it \".  Laura is getting up at least three times every night, but often 6-8 times.    The patient is s/p hysterectomy.  She reports positive bulge outside her body, urinary incontinence, and stool trapping, but denies pelvic pain, vaginal pressure, and sensation of incomplete bladder emptying.  Patient reports she was having very frequent UTI's until she started taking Cranberry pills every day.  The patient feels like the problem began several years ago.  She is not currently sexually active.  Laura Conner has had surgery for this problem in the past - she had a bladder sling with Dr. Caceres about 12 years ago.  She denies leakage with coughing or sneezing or laughing, at least most of the time - says that will happen only rarely.    Past Medical History:   Diagnosis Date    Matos's esophagus     Breast cancer 1984    left breast cancer    Gout     H/O acute renal failure     due to antibiotic    Hypercholesteremia     Hypertension     Hypertriglyceridemia     Hypothyroid     Kidney cyst, acquired     Leg edema     Osteoarthritis     Venous insufficiency        Current Outpatient Medications:     allopurinol (ZYLOPRIM) 100 MG tablet, Take 1 tablet by mouth Daily., Disp: , Rfl:     Cholecalciferol 50 MCG (2000 UT) tablet, Daily., Disp: , Rfl:     clobetasol (Temovate) 0.05 % cream, Apply 1 application topically to the appropriate area as directed 2 (Two) Times a Day., Disp: 15 g, Rfl: 5    cyclobenzaprine (FLEXERIL) 10 MG tablet, Take 1 tablet by mouth 3 (Three) " Times a Day As Needed for Muscle Spasms., Disp: 90 tablet, Rfl: 1    dexamethasone 0.5 MG/5ML solution, Take 10 mL by mouth 2 (Two) Times a Day., Disp: , Rfl:     Farxiga 10 MG tablet, Take 10 mg by mouth Daily., Disp: , Rfl:     olmesartan-hydrochlorothiazide (BENICAR HCT) 40-12.5 MG per tablet, Take 1 tablet by mouth Daily., Disp: , Rfl:     simvastatin (ZOCOR) 20 MG tablet, Take 1 tablet by mouth Every Night., Disp: , Rfl:     Synthroid 88 MCG tablet, Take 1 tablet by mouth Daily., Disp: , Rfl:     TURMERIC PO, Take  by mouth., Disp: , Rfl:   Family History   Problem Relation Age of Onset    Breast cancer Sister     Bone cancer Sister     Stroke Sister     Heart failure Mother     Diabetes Mother     Arthritis Mother     Heart disease Father     Stroke Father     Prostate cancer Father     Aneurysm Father     Lung cancer Brother     Diabetes Brother     Heart disease Sister     Parkinsonism Sister     Stroke Paternal Grandmother      Social History     Socioeconomic History    Marital status:    Tobacco Use    Smoking status: Never    Smokeless tobacco: Never   Vaping Use    Vaping status: Never Used   Substance and Sexual Activity    Alcohol use: No    Drug use: No    Sexual activity: Yes     Partners: Male     Birth control/protection: Surgical     Allergies   Allergen Reactions    Ciprofloxacin Other (See Comments)     Caused patient to go into kidney failure       Family History   Problem Relation Age of Onset    Breast cancer Sister     Bone cancer Sister     Stroke Sister     Heart failure Mother     Diabetes Mother     Arthritis Mother     Heart disease Father     Stroke Father     Prostate cancer Father     Aneurysm Father     Lung cancer Brother     Diabetes Brother     Heart disease Sister     Parkinsonism Sister     Stroke Paternal Grandmother      Review of Systems   Constitutional:  Negative for activity change and unexpected weight change.   Respiratory:  Negative for shortness of breath.   "  Cardiovascular:  Negative for chest pain.   Gastrointestinal:  Negative for abdominal pain, constipation and diarrhea.        No constipation, but does have stool trapping   Genitourinary:  Positive for difficulty urinating, enuresis, frequency, urgency and vaginal pain (pressure). Negative for dysuria and pelvic pain.        Not sexually active           Objective   /62   Ht 162.6 cm (64\")   Wt 73.9 kg (163 lb)   LMP  (LMP Unknown)   BMI 27.98 kg/m²     Physical Exam  Vitals and nursing note reviewed.   Constitutional:       General: She is not in acute distress.     Appearance: She is well-developed.   HENT:      Head: Normocephalic and atraumatic.   Neck:      Thyroid: No thyromegaly.   Pulmonary:      Effort: Pulmonary effort is normal.   Abdominal:      General: There is no distension.      Palpations: Abdomen is soft.      Tenderness: There is no abdominal tenderness.   Genitourinary:     General: Normal vulva.      Comments: Grade II cystocele, grade II-III rectocele.  Vaginal mucosa pale, with loss of rugae.  No urethral prolapse.  Musculoskeletal:         General: Normal range of motion.      Cervical back: Normal range of motion.   Skin:     General: Skin is warm and dry.   Neurological:      Mental Status: She is alert and oriented to person, place, and time.   Psychiatric:         Behavior: Behavior normal.         Judgment: Judgment normal.       Imaging   No data reviewed       Assessment & Plan  Diagnoses and all orders for this visit:    1. Urinary frequency (Primary): testing for infection to rule-out UTI as cause of increased frequency  -     Urinalysis With Microscopic If Indicated (No Culture) - Urine, Clean Catch  -     Urine Culture - Urine, Urine, Clean Catch    2. Urinary incontinence, unspecified type: Patient will return for [facility] urodynamics and then see me two weeks later. Patient reports she has already had this done, but that was 12 years ago in preparation for a mid-urethral " sling with Dr. Caceres and the patient has been advised that those results would not longer be valid  -     Urinalysis With Microscopic If Indicated (No Culture) - Urine, Clean Catch  -     Urine Culture - Urine, Urine, Clean Catch    3. Cystocele, midline  Comments:  grade II    4. Rectocele  Comments:  grade II-III    5. S/P hysterectomy           Yue Morales MD  3/21/2024  11:13 CDT

## 2024-03-23 LAB
APPEARANCE UR: CLEAR
BACTERIA UR CULT: NORMAL
BACTERIA UR CULT: NORMAL
BILIRUB UR QL STRIP: NEGATIVE
COLOR UR: YELLOW
GLUCOSE UR QL STRIP: ABNORMAL
HGB UR QL STRIP: NEGATIVE
KETONES UR QL STRIP: NEGATIVE
LEUKOCYTE ESTERASE UR QL STRIP: NEGATIVE
NITRITE UR QL STRIP: NEGATIVE
PH UR STRIP: 7.5 [PH] (ref 5–8)
PROT UR QL STRIP: NEGATIVE
SP GR UR STRIP: 1.02 (ref 1–1.03)
UROBILINOGEN UR STRIP-MCNC: ABNORMAL MG/DL

## 2024-03-25 ENCOUNTER — TELEPHONE (OUTPATIENT)
Dept: GASTROENTEROLOGY | Age: 79
End: 2024-03-25

## 2024-03-29 NOTE — TELEPHONE ENCOUNTER
Called pt. She stated she was not at home and did not have her calendar. She said she would call back next week to schedule.

## 2024-04-05 ENCOUNTER — OFFICE VISIT (OUTPATIENT)
Dept: OBSTETRICS AND GYNECOLOGY | Age: 79
End: 2024-04-05
Payer: MEDICARE

## 2024-04-05 VITALS
DIASTOLIC BLOOD PRESSURE: 72 MMHG | BODY MASS INDEX: 27.83 KG/M2 | SYSTOLIC BLOOD PRESSURE: 126 MMHG | HEIGHT: 64 IN | WEIGHT: 163 LBS

## 2024-04-05 DIAGNOSIS — Z01.818 PRE-OP EVALUATION: ICD-10-CM

## 2024-04-05 DIAGNOSIS — Z90.710 S/P HYSTERECTOMY: ICD-10-CM

## 2024-04-05 DIAGNOSIS — N81.11 CYSTOCELE, MIDLINE: ICD-10-CM

## 2024-04-05 DIAGNOSIS — R35.0 URINARY FREQUENCY: ICD-10-CM

## 2024-04-05 DIAGNOSIS — N81.6 RECTOCELE: Primary | ICD-10-CM

## 2024-04-05 PROBLEM — N81.10 FEMALE CYSTOCELE: Status: ACTIVE | Noted: 2024-04-05

## 2024-04-05 RX ORDER — SODIUM CHLORIDE 9 MG/ML
40 INJECTION, SOLUTION INTRAVENOUS AS NEEDED
OUTPATIENT
Start: 2024-04-05

## 2024-04-05 RX ORDER — ACETAMINOPHEN 500 MG
1000 TABLET ORAL ONCE
OUTPATIENT
Start: 2024-04-05 | End: 2024-04-05

## 2024-04-05 RX ORDER — SODIUM CHLORIDE 0.9 % (FLUSH) 0.9 %
10 SYRINGE (ML) INJECTION AS NEEDED
OUTPATIENT
Start: 2024-04-05

## 2024-04-05 RX ORDER — GABAPENTIN 100 MG/1
600 CAPSULE ORAL ONCE
OUTPATIENT
Start: 2024-04-05 | End: 2024-04-05

## 2024-04-05 RX ORDER — SODIUM CHLORIDE 0.9 % (FLUSH) 0.9 %
10 SYRINGE (ML) INJECTION EVERY 12 HOURS SCHEDULED
OUTPATIENT
Start: 2024-04-05

## 2024-04-05 RX ORDER — CYCLOBENZAPRINE HCL 5 MG
5 TABLET ORAL 3 TIMES DAILY
COMMUNITY
Start: 2024-03-25

## 2024-04-05 NOTE — PROGRESS NOTES
"Subjective     Chief Complaint   Patient presents with    Urinary Frequency     Pt here today for follow up after having urodynamics testing for urinary frequency. Pt voices no other concerns.      Patient was just here and seen for bladder problems 2 weeks ago.  She has subsequently had urodynamic testing ([facility] was here the very next day), and returns now to discuss the results.  The HPI from that recent visit is copied here:    Laura Conner is a 78 y.o. year old who presents to be seen for urinary incontinence and frequency.  Patient says that she voids every 30-60 minutes. When asked about urgency, patient responds \" I just go, there is no holding it \".  Laura is getting up at least three times every night, but often 6-8 times.    The patient is s/p hysterectomy.  She reports positive bulge outside her body, urinary incontinence, and stool trapping, but denies pelvic pain, vaginal pressure, and sensation of incomplete bladder emptying.  Patient reports she was having very frequent UTI's until she started taking Cranberry pills every day.  The patient feels like the problem began several years ago.  She is not currently sexually active.  Laura Conner has had surgery for this problem in the past - she had a bladder sling with Dr. Caceres about 12 years ago.  She denies leakage with coughing or sneezing or laughing, at least most of the time - says that will happen only rarely.    Past Medical History:   Diagnosis Date    Matos's esophagus     Breast cancer 1984    left breast cancer    Gout     H/O acute renal failure     due to antibiotic    Hypercholesteremia     Hypertension     Hypertriglyceridemia     Hypothyroid     Kidney cyst, acquired     Leg edema     Osteoarthritis     Venous insufficiency        Current Outpatient Medications:     allopurinol (ZYLOPRIM) 100 MG tablet, Take 1 tablet by mouth Daily., Disp: , Rfl:     Cholecalciferol 50 MCG (2000 UT) tablet, Daily., Disp: , Rfl:     clobetasol " (Temovate) 0.05 % cream, Apply 1 application topically to the appropriate area as directed 2 (Two) Times a Day., Disp: 15 g, Rfl: 5    cyclobenzaprine (FLEXERIL) 5 MG tablet, Take 1 tablet by mouth 3 (Three) Times a Day., Disp: , Rfl:     dexamethasone 0.5 MG/5ML solution, Take 10 mL by mouth 2 (Two) Times a Day., Disp: , Rfl:     Farxiga 10 MG tablet, Take 10 mg by mouth Daily., Disp: , Rfl:     olmesartan-hydrochlorothiazide (BENICAR HCT) 40-12.5 MG per tablet, Take 1 tablet by mouth Daily., Disp: , Rfl:     simvastatin (ZOCOR) 20 MG tablet, Take 1 tablet by mouth Every Night., Disp: , Rfl:     Synthroid 88 MCG tablet, Take 1 tablet by mouth Daily., Disp: , Rfl:     TURMERIC PO, Take  by mouth., Disp: , Rfl:   Family History   Problem Relation Age of Onset    Breast cancer Sister     Bone cancer Sister     Stroke Sister     Heart failure Mother     Diabetes Mother     Arthritis Mother     Heart disease Father     Stroke Father     Prostate cancer Father     Aneurysm Father     Lung cancer Brother     Diabetes Brother     Heart disease Sister     Parkinsonism Sister     Stroke Paternal Grandmother      Social History     Socioeconomic History    Marital status:    Tobacco Use    Smoking status: Never    Smokeless tobacco: Never   Vaping Use    Vaping status: Never Used   Substance and Sexual Activity    Alcohol use: No    Drug use: No    Sexual activity: Yes     Partners: Male     Birth control/protection: Surgical     Allergies   Allergen Reactions    Ciprofloxacin Other (See Comments)     Caused patient to go into kidney failure       Family History   Problem Relation Age of Onset    Breast cancer Sister     Bone cancer Sister     Stroke Sister     Heart failure Mother     Diabetes Mother     Arthritis Mother     Heart disease Father     Stroke Father     Prostate cancer Father     Aneurysm Father     Lung cancer Brother     Diabetes Brother     Heart disease Sister     Parkinsonism Sister     Stroke  "Paternal Grandmother      Review of Systems   Constitutional:  Negative for activity change and unexpected weight change.   Respiratory:  Negative for shortness of breath.    Cardiovascular:  Negative for chest pain.   Gastrointestinal:  Negative for abdominal pain, constipation and diarrhea.        No constipation, but does have stool trapping   Genitourinary:  Positive for difficulty urinating, enuresis, frequency, urgency and vaginal pain (pressure). Negative for dysuria and pelvic pain.        Not sexually active           Objective   /72   Ht 162.6 cm (64\")   Wt 73.9 kg (163 lb)   LMP  (LMP Unknown)   BMI 27.98 kg/m²     Physical Exam  Vitals and nursing note reviewed.   Constitutional:       General: She is not in acute distress.     Appearance: She is well-developed.   HENT:      Head: Normocephalic and atraumatic.   Neck:      Thyroid: No thyromegaly.   Pulmonary:      Effort: Pulmonary effort is normal.   Abdominal:      General: There is no distension.      Palpations: Abdomen is soft.      Tenderness: There is no abdominal tenderness.   Genitourinary:     General: Normal vulva.      Comments: Grade II cystocele, grade II-III rectocele.  Vaginal mucosa pale, with loss of rugae.  No urethral prolapse.  Musculoskeletal:         General: Normal range of motion.      Cervical back: Normal range of motion.   Skin:     General: Skin is warm and dry.   Neurological:      Mental Status: She is alert and oriented to person, place, and time.   Psychiatric:         Behavior: Behavior normal.         Judgment: Judgment normal.         Imaging   No data reviewed       Assessment & Plan  Diagnoses and all orders for this visit:    1. Urinary frequency (Primary): Urodynamic testing shows a PVR of only 50 mL.  The patient does have a grade 2 cystocele, but appears to be emptying her bladder almost completely.  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  The patient also has " stage III kidney disease.  She has already tried Myrbetriq, without significant improvement, and is not a great candidate to try other anticholinergic medications.  I have had the patient watched the InterStim video brochure and we have discussed the fact that there is a temporary version of the device which could be placed in the office.  The patient understands that 70% of all patients have significant improvement in their symptoms.  She wants to do an InterStim trial, but wants to address difficulty with her bowels first      2. Urinary incontinence, unspecified type: The patient is 12 years status post a mid urethral sling with Dr. Caceres.  This still appears to be working well, and she denies any leakage with coughing or sneezing.  Urodynamic testing shows the patient to have significant detrusor overactivity, which I think is the cause of her frequency and leakage.    3. Cystocele, midline  Comments:  grade II    4. Rectocele: The patient understands that she will need to address the urinary frequency separate from the prolapse issues.  She feels that incomplete evacuation of her bowels is the bigger problem and wants to address that first.  Anterior repair and/or Posterior repair were discussed with the patient as one options for management of her condition.  The patient was advised that expectant management or the use of a pessary were more conservative options and that surgery for prolapse is only necessary if the patient is having bladder or bowel dysfunction.  The patient feels that surgery is appropriate for her and wishes to proceed.  The surgical procedure, including risks and benefits, was described.  Post-op restrictions were reviewed.  All questions were answered to her satisfaction.   The patient is being scheduled for an anterior and posterior vaginal repair.  We will plan for her to do an in office InterStim trial 4 to 6 weeks after that  Comments:  grade II-III    5. S/P hysterectomy            Yue Morales MD  4/5/2024  11:21 CDT

## 2024-04-05 NOTE — PROGRESS NOTES
Subjective:     Patient ID: Jorge Ruelas is a 65 y.o. female  PCP: Altagracia Restrepo MD  Referring Provider: No ref. provider found    HPI  Patient presents to the office today with the following complaints: Follow-up      Pt seen today for follow up. Hx chronic GERD and Marie's esophagus. Currently treated with Omeprazole 40 mg daily. Pt states symptoms are currently controlled. Today, pt has c/o lower abdominal pain. This began 3 days ago. She c/o lower abdominal pain. This morning pt states pain is more located on left side. Hx diverticulitis, last flare 2016. She reports loss of appetite with this. She denies any constipation or diarrhea. Denies any blood in stool. Pt states she is unable to tolerate Cipro due to kidney failure. Last EGD 11/2021 - Gastritis, no Marie's, no h pylori, 3 yr recall  Last Colonoscopy 2019 - Questionable lipoma, diverticular disease-Tubular AP (-) dysplasia, 5 yr recall  Family hx colon cancer - Paternal Aunt    Assessment:     1. Diverticulitis    2. Chronic GERD    3. 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 Avoid roughage for 2 weeks, then begin high fiber diet. Orders  No orders of the defined types were placed in this encounter.     Medications  Orders Placed This Encounter   Medications    metroNIDAZOLE (FLAGYL) 500 MG tablet     Sig: Take 1 tablet by mouth 3 times daily for 10 days     Dispense:  30 tablet     Refill:  0    ondansetron (ZOFRAN) 4 MG tablet     Sig: Take 1 tablet by mouth every 6 hours as needed for Nausea     Dispense:  20 tablet     Refill:  0         Patient History:     Past Medical History:   Diagnosis Date    Acute renal failure ([facility] [address] 07/29/2016    discharged 8/1/16; \"due to strong antibiotic and being dehydrated\"    Arthritis     Breast cancer ([facility] [address]     surgery only    Cancer ([facility] [address]     Breast cancer    Colon polyps     Diverticulitis     GERD (gastroesophageal reflux disease)     Hyperlipidemia     Hypertension     Joint pain     Thyroid condition        Past Surgical History:   Procedure Laterality Date    ABDOMEN SURGERY      BLADDER REPAIR      BLEPHAROPLASTY      BREAST ENHANCEMENT SURGERY      BUNIONECTOMY      CATARACT REMOVAL Bilateral     COLONOSCOPY  03/10/2009    Dr Negin Chavez, [place], lipoma in the proximal ascending colon, 5 yr recall    COLONOSCOPY  04/29/2014    Dr Griffin Mock x 2, [place], 5 yr recall    COLONOSCOPY N/A 05/21/2019    Dr Gloria Church-Questionable lipoma, diverticular disease-Tubular AP (-) dysplasia, 5 yr recall    HYSTERECTOMY (CERVIX STATUS UNKNOWN)      KNEE SURGERY Left 08/17/2016    MASTECTOMY Bilateral 1984    OVARY REMOVAL      PARTIAL HYSTERECTOMY (CERVIX NOT REMOVED)      PATELLA SURGERY      THYROIDECTOMY, PARTIAL      TONSILLECTOMY AND ADENOIDECTOMY      TOTAL KNEE ARTHROPLASTY Left 08/17/2016    KNEE TOTAL ARTHROPLASTY performed by April Franco MD at [address] N/A 08/27/2020    Dr Gloria Church-[id number] obstructing ring in [facility], severe esophagitis, gastritis, (+) Marie's, indeterminate for dysplasia--1st dx, Repeat in 3 months    UPPER GASTROINTESTINAL ENDOSCOPY N/A 11/12/2020    Dr Gloria Church-w/[id number]-Ovedmjoucbef ring at the [facility]-Well healed esophagitis w/Schatzkis ring (+) Marie's, 1 yr recall    UPPER GASTROINTESTINAL ENDOSCOPY N/A 11/29/2021    Dr Gloria Church-Gastritis, no Marie's, no h pylori, 3 yr recall       Family History   Problem Relation Age of Onset    Prostate Cancer Father     Breast Cancer Sister     Lung Cancer Brother     Colon Cancer Paternal Aunt     Colon Polyps Paternal Aunt     Stomach Cancer Other     Esophageal Cancer Neg Hx     Liver Cancer Neg Hx     Liver Disease Neg Hx Rectal Cancer Neg Hx        Social History     Socioeconomic History    Marital status:    Tobacco Use    Smoking status: Never    Smokeless tobacco: Never   Vaping Use    Vaping Use: Never used   Substance and Sexual Activity    Alcohol use: No    Drug use: No       Current Outpatient Medications   Medication Sig Dispense Refill    allopurinol (ZYLOPRIM) 100 MG tablet Take 200 mg by mouth daily      metroNIDAZOLE (FLAGYL) 500 MG tablet Take 1 tablet by mouth 3 times daily for 10 days 30 tablet 0    ondansetron (ZOFRAN) 4 MG tablet Take 1 tablet by mouth every 6 hours as needed for Nausea 20 tablet 0    omeprazole (PRILOSEC) 40 MG delayed release capsule Take 1 capsule by mouth every morning (before breakfast) 90 capsule 3    olmesartan-hydroCHLOROthiazide (BENICAR HCT) 40-12.5 MG per tablet Take 1 tablet by mouth daily      levothyroxine (SYNTHROID) 50 MCG tablet Take of the 100 mcg Synthroid - use two 50s (Patient taking differently: Take 75 mcg by mouth Daily) 30 tablet 0    simvastatin (ZOCOR) 20 MG tablet Take 20 mg by mouth nightly        No current facility-administered medications for this visit. Allergies   Allergen Reactions    Ciprofloxacin Other (See Comments)     Kidney failure    Morphine      Very Restless       Review of Systems   Constitutional:  Positive for appetite change. Negative for activity change, fatigue, fever and unexpected weight change. HENT:  Negative for trouble swallowing. Respiratory:  Negative for cough, choking and shortness of breath. Cardiovascular:  Negative for chest pain. Gastrointestinal:  Positive for abdominal pain and diarrhea (loose). Negative for abdominal distention, anal bleeding, blood in stool, constipation, nausea, rectal pain and vomiting. Reflux - controlled   Allergic/Immunologic: Negative for food allergies. All other systems reviewed and are negative.       Objective:     /66 (Site: Left Upper Arm)   Pulse 81   Ht 5' 4\" show (1.626 m)   Wt 190 lb (86.2 kg)   SpO2 96%   BMI 32.61 kg/m²     Physical Exam  Vitals reviewed. Constitutional:       General: She is not in acute distress. Appearance: She is well-developed. HENT:      Head: Normocephalic and atraumatic. Right Ear: External ear normal.      Left Ear: External ear normal.      Nose: Nose normal.   Eyes:      Conjunctiva/sclera: Conjunctivae normal.      Pupils: Pupils are equal, round, and reactive to light. Cardiovascular:      Rate and Rhythm: Normal rate and regular rhythm. Heart sounds: Normal heart sounds. No murmur heard. No friction rub. No gallop. Pulmonary:      Effort: Pulmonary effort is normal. No respiratory distress. Breath sounds: Normal breath sounds. Abdominal:      General: Bowel sounds are normal. There is no distension. Palpations: Abdomen is soft. There is no mass. Tenderness: There is abdominal tenderness in the right lower quadrant and left lower quadrant. There is no guarding or rebound. Musculoskeletal:         General: Normal range of motion. Cervical back: Normal range of motion and neck supple. Skin:     General: Skin is warm and dry. Findings: No rash. Nails: There is no clubbing. Neurological:      Mental Status: She is alert and oriented to person, place, and time. Gait: Gait normal.   Psychiatric:         Behavior: Behavior normal.         Thought Content:  Thought content normal.

## 2024-04-12 ENCOUNTER — PRE-ADMISSION TESTING (OUTPATIENT)
Dept: PREADMISSION TESTING | Facility: HOSPITAL | Age: 79
End: 2024-04-12
Payer: MEDICARE

## 2024-04-12 VITALS
WEIGHT: 166.01 LBS | SYSTOLIC BLOOD PRESSURE: 145 MMHG | HEIGHT: 63 IN | BODY MASS INDEX: 29.41 KG/M2 | DIASTOLIC BLOOD PRESSURE: 76 MMHG | OXYGEN SATURATION: 96 % | HEART RATE: 61 BPM | RESPIRATION RATE: 16 BRPM

## 2024-04-12 DIAGNOSIS — N81.6 RECTOCELE: ICD-10-CM

## 2024-04-12 LAB
ANION GAP SERPL CALCULATED.3IONS-SCNC: 10 MMOL/L (ref 5–15)
BUN SERPL-MCNC: 25 MG/DL (ref 8–23)
BUN/CREAT SERPL: 30.5 (ref 7–25)
CALCIUM SPEC-SCNC: 10.5 MG/DL (ref 8.6–10.5)
CHLORIDE SERPL-SCNC: 101 MMOL/L (ref 98–107)
CO2 SERPL-SCNC: 29 MMOL/L (ref 22–29)
CREAT SERPL-MCNC: 0.82 MG/DL (ref 0.57–1)
EGFRCR SERPLBLD CKD-EPI 2021: 72.9 ML/MIN/1.73
GLUCOSE SERPL-MCNC: 88 MG/DL (ref 65–99)
POTASSIUM SERPL-SCNC: 4.1 MMOL/L (ref 3.5–5.2)
SODIUM SERPL-SCNC: 140 MMOL/L (ref 136–145)

## 2024-04-12 PROCEDURE — 80048 BASIC METABOLIC PNL TOTAL CA: CPT

## 2024-04-12 PROCEDURE — 85025 COMPLETE CBC W/AUTO DIFF WBC: CPT | Performed by: OBSTETRICS & GYNECOLOGY

## 2024-04-12 PROCEDURE — 36415 COLL VENOUS BLD VENIPUNCTURE: CPT

## 2024-04-12 PROCEDURE — 93005 ELECTROCARDIOGRAM TRACING: CPT

## 2024-04-12 PROCEDURE — 93010 ELECTROCARDIOGRAM REPORT: CPT | Performed by: HOSPITALIST

## 2024-04-12 NOTE — DISCHARGE INSTRUCTIONS

## 2024-04-13 LAB
QT INTERVAL: 422 MS
QTC INTERVAL: 422 MS

## 2024-04-16 ENCOUNTER — TELEPHONE (OUTPATIENT)
Dept: OBSTETRICS AND GYNECOLOGY | Age: 79
End: 2024-04-16
Payer: MEDICARE

## 2024-04-16 NOTE — TELEPHONE ENCOUNTER
Called and spoke with pt re: interstim trial. Pt voices she wants to wait as her  is having surgery on 5-15-24. Pt scheduled for surgery with livier on 4-22-24 and wants to heal from that to see if its even necessary. Questions answered and advised pt we will keep her on the schedule for the next round if she changes her mind. Pt understood.

## 2024-04-22 ENCOUNTER — ANESTHESIA (OUTPATIENT)
Dept: PERIOP | Facility: HOSPITAL | Age: 79
End: 2024-04-22
Payer: MEDICARE

## 2024-04-22 ENCOUNTER — ANESTHESIA EVENT (OUTPATIENT)
Dept: PERIOP | Facility: HOSPITAL | Age: 79
End: 2024-04-22
Payer: MEDICARE

## 2024-04-22 ENCOUNTER — HOSPITAL ENCOUNTER (OUTPATIENT)
Facility: HOSPITAL | Age: 79
Discharge: HOME | End: 2024-04-23
Attending: OBSTETRICS & GYNECOLOGY | Admitting: OBSTETRICS & GYNECOLOGY
Payer: MEDICARE

## 2024-04-22 DIAGNOSIS — N81.6 RECTOCELE: ICD-10-CM

## 2024-04-22 DIAGNOSIS — Z09 S/P GYNECOLOGICAL SURGERY, FOLLOW-UP EXAM: Primary | ICD-10-CM

## 2024-04-22 PROCEDURE — 63710000001 CALCIUM CARBONATE 500 MG CHEWABLE TABLET: Performed by: OBSTETRICS & GYNECOLOGY

## 2024-04-22 PROCEDURE — A9270 NON-COVERED ITEM OR SERVICE: HCPCS | Performed by: NURSE ANESTHETIST, CERTIFIED REGISTERED

## 2024-04-22 PROCEDURE — A9270 NON-COVERED ITEM OR SERVICE: HCPCS | Performed by: OBSTETRICS & GYNECOLOGY

## 2024-04-22 PROCEDURE — 25010000002 CEFAZOLIN PER 500 MG: Performed by: OBSTETRICS & GYNECOLOGY

## 2024-04-22 PROCEDURE — S0260 H&P FOR SURGERY: HCPCS | Performed by: OBSTETRICS & GYNECOLOGY

## 2024-04-22 PROCEDURE — 25010000002 FENTANYL CITRATE (PF) 100 MCG/2ML SOLUTION: Performed by: NURSE ANESTHETIST, CERTIFIED REGISTERED

## 2024-04-22 PROCEDURE — 25010000002 PROPOFOL 10 MG/ML EMULSION: Performed by: NURSE ANESTHETIST, CERTIFIED REGISTERED

## 2024-04-22 PROCEDURE — 86850 RBC ANTIBODY SCREEN: CPT | Performed by: OBSTETRICS & GYNECOLOGY

## 2024-04-22 PROCEDURE — 63710000001 IBUPROFEN 200 MG TABLET: Performed by: OBSTETRICS & GYNECOLOGY

## 2024-04-22 PROCEDURE — 25010000002 VASOPRESSIN 20 UNIT/ML SOLUTION 1 ML VIAL: Performed by: OBSTETRICS & GYNECOLOGY

## 2024-04-22 PROCEDURE — 63710000001 OXYCODONE-ACETAMINOPHEN 5-325 MG TABLET: Performed by: OBSTETRICS & GYNECOLOGY

## 2024-04-22 PROCEDURE — 57260 CMBN ANT PST COLPRHY: CPT | Performed by: OBSTETRICS & GYNECOLOGY

## 2024-04-22 PROCEDURE — 25010000002 DEXAMETHASONE PER 1 MG: Performed by: NURSE ANESTHETIST, CERTIFIED REGISTERED

## 2024-04-22 PROCEDURE — 25010000002 GLYCOPYRROLATE 0.4 MG/2ML SOLUTION: Performed by: NURSE ANESTHETIST, CERTIFIED REGISTERED

## 2024-04-22 PROCEDURE — 25010000002 SODIUM CHLORIDE 0.9 % WITH KCL 20 MEQ 20-0.9 MEQ/L-% SOLUTION: Performed by: OBSTETRICS & GYNECOLOGY

## 2024-04-22 PROCEDURE — 86901 BLOOD TYPING SEROLOGIC RH(D): CPT | Performed by: OBSTETRICS & GYNECOLOGY

## 2024-04-22 PROCEDURE — 63710000001 OXYCODONE-ACETAMINOPHEN 10-325 MG TABLET: Performed by: NURSE ANESTHETIST, CERTIFIED REGISTERED

## 2024-04-22 PROCEDURE — 25010000002 FENTANYL CITRATE (PF) 50 MCG/ML SOLUTION: Performed by: NURSE ANESTHETIST, CERTIFIED REGISTERED

## 2024-04-22 PROCEDURE — 25010000002 ONDANSETRON PER 1 MG: Performed by: NURSE ANESTHETIST, CERTIFIED REGISTERED

## 2024-04-22 PROCEDURE — 25810000003 LACTATED RINGERS PER 1000 ML: Performed by: OBSTETRICS & GYNECOLOGY

## 2024-04-22 PROCEDURE — 86900 BLOOD TYPING SEROLOGIC ABO: CPT | Performed by: OBSTETRICS & GYNECOLOGY

## 2024-04-22 RX ORDER — LIDOCAINE HYDROCHLORIDE 20 MG/ML
INJECTION, SOLUTION EPIDURAL; INFILTRATION; INTRACAUDAL; PERINEURAL AS NEEDED
Status: DISCONTINUED | OUTPATIENT
Start: 2024-04-22 | End: 2024-04-22 | Stop reason: SURG

## 2024-04-22 RX ORDER — SODIUM CHLORIDE 0.9 % (FLUSH) 0.9 %
10 SYRINGE (ML) INJECTION EVERY 12 HOURS SCHEDULED
Status: DISCONTINUED | OUTPATIENT
Start: 2024-04-22 | End: 2024-04-22 | Stop reason: HOSPADM

## 2024-04-22 RX ORDER — SODIUM CHLORIDE 0.9 % (FLUSH) 0.9 %
10 SYRINGE (ML) INJECTION AS NEEDED
Status: DISCONTINUED | OUTPATIENT
Start: 2024-04-22 | End: 2024-04-22 | Stop reason: HOSPADM

## 2024-04-22 RX ORDER — GABAPENTIN 300 MG/1
600 CAPSULE ORAL ONCE
Status: COMPLETED | OUTPATIENT
Start: 2024-04-22 | End: 2024-04-22

## 2024-04-22 RX ORDER — PROPOFOL 10 MG/ML
VIAL (ML) INTRAVENOUS AS NEEDED
Status: DISCONTINUED | OUTPATIENT
Start: 2024-04-22 | End: 2024-04-22 | Stop reason: SURG

## 2024-04-22 RX ORDER — ONDANSETRON 2 MG/ML
INJECTION INTRAMUSCULAR; INTRAVENOUS AS NEEDED
Status: DISCONTINUED | OUTPATIENT
Start: 2024-04-22 | End: 2024-04-22 | Stop reason: SURG

## 2024-04-22 RX ORDER — HYDROMORPHONE HYDROCHLORIDE 1 MG/ML
0.5 INJECTION, SOLUTION INTRAMUSCULAR; INTRAVENOUS; SUBCUTANEOUS
Status: DISCONTINUED | OUTPATIENT
Start: 2024-04-22 | End: 2024-04-22 | Stop reason: HOSPADM

## 2024-04-22 RX ORDER — ACETAMINOPHEN 500 MG
1000 TABLET ORAL ONCE
Status: COMPLETED | OUTPATIENT
Start: 2024-04-22 | End: 2024-04-22

## 2024-04-22 RX ORDER — EPHEDRINE SULFATE 50 MG/ML
INJECTION INTRAVENOUS AS NEEDED
Status: DISCONTINUED | OUTPATIENT
Start: 2024-04-22 | End: 2024-04-22 | Stop reason: SURG

## 2024-04-22 RX ORDER — FENTANYL CITRATE 50 UG/ML
50 INJECTION, SOLUTION INTRAMUSCULAR; INTRAVENOUS
Status: DISCONTINUED | OUTPATIENT
Start: 2024-04-22 | End: 2024-04-22 | Stop reason: HOSPADM

## 2024-04-22 RX ORDER — DROPERIDOL 2.5 MG/ML
0.62 INJECTION, SOLUTION INTRAMUSCULAR; INTRAVENOUS ONCE AS NEEDED
Status: DISCONTINUED | OUTPATIENT
Start: 2024-04-22 | End: 2024-04-22 | Stop reason: HOSPADM

## 2024-04-22 RX ORDER — DEXTROSE MONOHYDRATE 25 G/50ML
12.5 INJECTION, SOLUTION INTRAVENOUS AS NEEDED
Status: DISCONTINUED | OUTPATIENT
Start: 2024-04-22 | End: 2024-04-22 | Stop reason: HOSPADM

## 2024-04-22 RX ORDER — LABETALOL HYDROCHLORIDE 5 MG/ML
5 INJECTION, SOLUTION INTRAVENOUS
Status: DISCONTINUED | OUTPATIENT
Start: 2024-04-22 | End: 2024-04-22 | Stop reason: HOSPADM

## 2024-04-22 RX ORDER — DIPHENHYDRAMINE HYDROCHLORIDE 50 MG/ML
25 INJECTION INTRAMUSCULAR; INTRAVENOUS NIGHTLY PRN
Status: DISCONTINUED | OUTPATIENT
Start: 2024-04-22 | End: 2024-04-23 | Stop reason: HOSPADM

## 2024-04-22 RX ORDER — SODIUM CHLORIDE AND POTASSIUM CHLORIDE 150; 900 MG/100ML; MG/100ML
100 INJECTION, SOLUTION INTRAVENOUS CONTINUOUS
Status: DISCONTINUED | OUTPATIENT
Start: 2024-04-22 | End: 2024-04-23 | Stop reason: HOSPADM

## 2024-04-22 RX ORDER — SODIUM CHLORIDE 0.9 % (FLUSH) 0.9 %
3 SYRINGE (ML) INJECTION AS NEEDED
Status: DISCONTINUED | OUTPATIENT
Start: 2024-04-22 | End: 2024-04-22 | Stop reason: HOSPADM

## 2024-04-22 RX ORDER — ROCURONIUM BROMIDE 10 MG/ML
INJECTION, SOLUTION INTRAVENOUS AS NEEDED
Status: DISCONTINUED | OUTPATIENT
Start: 2024-04-22 | End: 2024-04-22 | Stop reason: SURG

## 2024-04-22 RX ORDER — CALCIUM CARBONATE 500 MG/1
2 TABLET, CHEWABLE ORAL 3 TIMES DAILY PRN
Status: DISCONTINUED | OUTPATIENT
Start: 2024-04-22 | End: 2024-04-23 | Stop reason: HOSPADM

## 2024-04-22 RX ORDER — FENTANYL CITRATE 50 UG/ML
25 INJECTION, SOLUTION INTRAMUSCULAR; INTRAVENOUS
Status: DISCONTINUED | OUTPATIENT
Start: 2024-04-22 | End: 2024-04-22 | Stop reason: HOSPADM

## 2024-04-22 RX ORDER — DIPHENHYDRAMINE HCL 25 MG
25 CAPSULE ORAL NIGHTLY PRN
Status: DISCONTINUED | OUTPATIENT
Start: 2024-04-22 | End: 2024-04-23 | Stop reason: HOSPADM

## 2024-04-22 RX ORDER — FLUMAZENIL 0.1 MG/ML
0.2 INJECTION INTRAVENOUS AS NEEDED
Status: DISCONTINUED | OUTPATIENT
Start: 2024-04-22 | End: 2024-04-22 | Stop reason: HOSPADM

## 2024-04-22 RX ORDER — SODIUM CHLORIDE, SODIUM LACTATE, POTASSIUM CHLORIDE, CALCIUM CHLORIDE 600; 310; 30; 20 MG/100ML; MG/100ML; MG/100ML; MG/100ML
1000 INJECTION, SOLUTION INTRAVENOUS CONTINUOUS
Status: DISCONTINUED | OUTPATIENT
Start: 2024-04-22 | End: 2024-04-23 | Stop reason: HOSPADM

## 2024-04-22 RX ORDER — ONDANSETRON 2 MG/ML
4 INJECTION INTRAMUSCULAR; INTRAVENOUS
Status: DISCONTINUED | OUTPATIENT
Start: 2024-04-22 | End: 2024-04-22 | Stop reason: HOSPADM

## 2024-04-22 RX ORDER — ESTRADIOL 0.1 MG/G
CREAM VAGINAL AS NEEDED
Status: DISCONTINUED | OUTPATIENT
Start: 2024-04-22 | End: 2024-04-22 | Stop reason: HOSPADM

## 2024-04-22 RX ORDER — SIMETHICONE 80 MG
80 TABLET,CHEWABLE ORAL 4 TIMES DAILY PRN
Status: DISCONTINUED | OUTPATIENT
Start: 2024-04-22 | End: 2024-04-23 | Stop reason: HOSPADM

## 2024-04-22 RX ORDER — FENTANYL CITRATE 50 UG/ML
INJECTION, SOLUTION INTRAMUSCULAR; INTRAVENOUS AS NEEDED
Status: DISCONTINUED | OUTPATIENT
Start: 2024-04-22 | End: 2024-04-22 | Stop reason: SURG

## 2024-04-22 RX ORDER — DOCUSATE SODIUM 100 MG/1
100 CAPSULE, LIQUID FILLED ORAL 2 TIMES DAILY PRN
Status: DISCONTINUED | OUTPATIENT
Start: 2024-04-22 | End: 2024-04-23 | Stop reason: HOSPADM

## 2024-04-22 RX ORDER — LIDOCAINE HYDROCHLORIDE 40 MG/ML
SOLUTION TOPICAL AS NEEDED
Status: DISCONTINUED | OUTPATIENT
Start: 2024-04-22 | End: 2024-04-22 | Stop reason: SURG

## 2024-04-22 RX ORDER — ONDANSETRON 2 MG/ML
4 INJECTION INTRAMUSCULAR; INTRAVENOUS EVERY 6 HOURS PRN
Status: DISCONTINUED | OUTPATIENT
Start: 2024-04-22 | End: 2024-04-23 | Stop reason: HOSPADM

## 2024-04-22 RX ORDER — LIDOCAINE HYDROCHLORIDE 10 MG/ML
0.5 INJECTION, SOLUTION EPIDURAL; INFILTRATION; INTRACAUDAL; PERINEURAL ONCE AS NEEDED
Status: DISCONTINUED | OUTPATIENT
Start: 2024-04-22 | End: 2024-04-22 | Stop reason: HOSPADM

## 2024-04-22 RX ORDER — OXYCODONE HYDROCHLORIDE AND ACETAMINOPHEN 5; 325 MG/1; MG/1
1 TABLET ORAL EVERY 4 HOURS PRN
Status: DISCONTINUED | OUTPATIENT
Start: 2024-04-22 | End: 2024-04-23 | Stop reason: HOSPADM

## 2024-04-22 RX ORDER — OXYCODONE AND ACETAMINOPHEN 10; 325 MG/1; MG/1
1 TABLET ORAL EVERY 4 HOURS PRN
Status: DISCONTINUED | OUTPATIENT
Start: 2024-04-22 | End: 2024-04-22 | Stop reason: HOSPADM

## 2024-04-22 RX ORDER — SODIUM CHLORIDE, SODIUM LACTATE, POTASSIUM CHLORIDE, CALCIUM CHLORIDE 600; 310; 30; 20 MG/100ML; MG/100ML; MG/100ML; MG/100ML
9 INJECTION, SOLUTION INTRAVENOUS CONTINUOUS
Status: DISCONTINUED | OUTPATIENT
Start: 2024-04-22 | End: 2024-04-23 | Stop reason: HOSPADM

## 2024-04-22 RX ORDER — IBUPROFEN 200 MG
400 TABLET ORAL EVERY 6 HOURS PRN
Status: DISCONTINUED | OUTPATIENT
Start: 2024-04-22 | End: 2024-04-23 | Stop reason: HOSPADM

## 2024-04-22 RX ORDER — NALOXONE HCL 0.4 MG/ML
0.04 VIAL (ML) INJECTION AS NEEDED
Status: DISCONTINUED | OUTPATIENT
Start: 2024-04-22 | End: 2024-04-22 | Stop reason: HOSPADM

## 2024-04-22 RX ORDER — MAGNESIUM HYDROXIDE 1200 MG/15ML
LIQUID ORAL AS NEEDED
Status: DISCONTINUED | OUTPATIENT
Start: 2024-04-22 | End: 2024-04-22 | Stop reason: HOSPADM

## 2024-04-22 RX ORDER — ONDANSETRON 4 MG/1
4 TABLET, ORALLY DISINTEGRATING ORAL EVERY 6 HOURS PRN
Status: DISCONTINUED | OUTPATIENT
Start: 2024-04-22 | End: 2024-04-23 | Stop reason: HOSPADM

## 2024-04-22 RX ORDER — DEXAMETHASONE SODIUM PHOSPHATE 4 MG/ML
INJECTION, SOLUTION INTRA-ARTICULAR; INTRALESIONAL; INTRAMUSCULAR; INTRAVENOUS; SOFT TISSUE AS NEEDED
Status: DISCONTINUED | OUTPATIENT
Start: 2024-04-22 | End: 2024-04-22 | Stop reason: SURG

## 2024-04-22 RX ORDER — SODIUM CHLORIDE 9 MG/ML
40 INJECTION, SOLUTION INTRAVENOUS AS NEEDED
Status: DISCONTINUED | OUTPATIENT
Start: 2024-04-22 | End: 2024-04-22 | Stop reason: HOSPADM

## 2024-04-22 RX ORDER — GLYCOPYRROLATE 0.2 MG/ML
INJECTION INTRAMUSCULAR; INTRAVENOUS AS NEEDED
Status: DISCONTINUED | OUTPATIENT
Start: 2024-04-22 | End: 2024-04-22 | Stop reason: SURG

## 2024-04-22 RX ORDER — NEOSTIGMINE METHYLSULFATE 5 MG/5 ML
SYRINGE (ML) INTRAVENOUS AS NEEDED
Status: DISCONTINUED | OUTPATIENT
Start: 2024-04-22 | End: 2024-04-22 | Stop reason: SURG

## 2024-04-22 RX ORDER — IBUPROFEN 600 MG/1
600 TABLET ORAL EVERY 6 HOURS PRN
Status: DISCONTINUED | OUTPATIENT
Start: 2024-04-22 | End: 2024-04-22 | Stop reason: HOSPADM

## 2024-04-22 RX ADMIN — GABAPENTIN 600 MG: 300 CAPSULE ORAL at 09:51

## 2024-04-22 RX ADMIN — DEXAMETHASONE SODIUM PHOSPHATE 8 MG: 4 INJECTION, SOLUTION INTRAMUSCULAR; INTRAVENOUS at 12:59

## 2024-04-22 RX ADMIN — OXYCODONE AND ACETAMINOPHEN 1 TABLET: 5; 325 TABLET ORAL at 23:20

## 2024-04-22 RX ADMIN — PROPOFOL 100 MG: 10 INJECTION, EMULSION INTRAVENOUS at 12:20

## 2024-04-22 RX ADMIN — IBUPROFEN 400 MG: 200 TABLET, FILM COATED ORAL at 15:06

## 2024-04-22 RX ADMIN — OXYCODONE AND ACETAMINOPHEN 1 TABLET: 5; 325 TABLET ORAL at 18:41

## 2024-04-22 RX ADMIN — SODIUM CHLORIDE 1000 MG: 900 INJECTION INTRAVENOUS at 20:52

## 2024-04-22 RX ADMIN — FENTANYL CITRATE 50 MCG: 50 INJECTION, SOLUTION INTRAMUSCULAR; INTRAVENOUS at 13:41

## 2024-04-22 RX ADMIN — ROCURONIUM BROMIDE 50 MG: 10 INJECTION, SOLUTION INTRAVENOUS at 12:21

## 2024-04-22 RX ADMIN — FENTANYL CITRATE 50 MCG: 50 INJECTION, SOLUTION INTRAMUSCULAR; INTRAVENOUS at 12:20

## 2024-04-22 RX ADMIN — ACETAMINOPHEN 1000 MG: 500 TABLET, FILM COATED ORAL at 09:51

## 2024-04-22 RX ADMIN — EPHEDRINE SULFATE 10 MG: 50 INJECTION INTRAVENOUS at 12:26

## 2024-04-22 RX ADMIN — EPHEDRINE SULFATE 10 MG: 50 INJECTION INTRAVENOUS at 12:30

## 2024-04-22 RX ADMIN — LIDOCAINE HYDROCHLORIDE 1 EACH: 40 SOLUTION TOPICAL at 12:22

## 2024-04-22 RX ADMIN — LIDOCAINE HYDROCHLORIDE 60 MG: 20 INJECTION, SOLUTION EPIDURAL; INFILTRATION; INTRACAUDAL; PERINEURAL at 12:20

## 2024-04-22 RX ADMIN — GLYCOPYRROLATE 0.4 MG: 0.2 INJECTION INTRAMUSCULAR; INTRAVENOUS at 13:11

## 2024-04-22 RX ADMIN — POTASSIUM CHLORIDE AND SODIUM CHLORIDE 100 ML/HR: 900; 150 INJECTION, SOLUTION INTRAVENOUS at 18:00

## 2024-04-22 RX ADMIN — SODIUM CHLORIDE, POTASSIUM CHLORIDE, SODIUM LACTATE AND CALCIUM CHLORIDE 1000 ML: 600; 310; 30; 20 INJECTION, SOLUTION INTRAVENOUS at 09:52

## 2024-04-22 RX ADMIN — OXYCODONE AND ACETAMINOPHEN 1 TABLET: 325; 10 TABLET ORAL at 13:40

## 2024-04-22 RX ADMIN — FENTANYL CITRATE 50 MCG: 50 INJECTION, SOLUTION INTRAMUSCULAR; INTRAVENOUS at 13:05

## 2024-04-22 RX ADMIN — Medication 3 MG: at 13:11

## 2024-04-22 RX ADMIN — ANTACID TABLETS 2 TABLET: 500 TABLET, CHEWABLE ORAL at 23:20

## 2024-04-22 RX ADMIN — CEFAZOLIN 2000 MG: 2 INJECTION, POWDER, FOR SOLUTION INTRAMUSCULAR; INTRAVENOUS at 12:27

## 2024-04-22 RX ADMIN — FENTANYL CITRATE 50 MCG: 50 INJECTION, SOLUTION INTRAMUSCULAR; INTRAVENOUS at 12:33

## 2024-04-22 RX ADMIN — ONDANSETRON 4 MG: 2 INJECTION INTRAMUSCULAR; INTRAVENOUS at 13:05
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
## 2024-04-22 NOTE — OP NOTE
Jessica Conner  : 1945  MRN: [id number]  [facility]: [id number]  Date: 2024    Operative Note    ANTERIOR AND POSTERIOR VAGINAL REPAIR      Pre-op Diagnosis:  Rectocele [N81.6]  cystocele   Post-op Diagnosis:  Post-Op Diagnosis Codes:     * Rectocele [N81.6]  Cystocele   Procedure: Procedure(s):  ANTERIOR AND POSTERIOR VAGINAL REPAIR   Surgeon: Surgeon(s):  Yue Morales MD       Anesthesia: General   Estimated Blood Loss: 25   mls   Fluids: 800   mls   UOP: clear   mls   ABx: ancef   Specimens:  none   Findings: Grade III rectocele, grade II cystocele   Complications: none     Description of Procedure:       The patient was taken to the OR, where she was prepped and draped in the usual sterile fashion in the dorsal lithotomy position using Alex stirrups.  The patient's bladder was drained using an in and out cath.  A Busch retractor was placed anteriorly to aid with visualization, and the posterior introitus was grasped with Allis clamps at 5 and 7 o'clock.  The posterior vaginal wall was injected with Pitressin in injectable saline.  A scalpel was used to make a midline incision the entire length of the posterior vaginal wall.  The edges of this incision were grasped with Allis clamps.  Metzenbaum scissors were used to dissect the excess vaginal mucosa off the underlying vaginal wall.  3-0 Vicryl pop-off sutures were used to create a midline plication and reduce the patient's posterior wall defect.  Excess vaginal mucosa was trimmed from the edges of the incision with curved Cherry scissors.  The resulting incision edges were reapproximated in a proximal to distal fashion using a 2-0 Vicryl swedged-on suture in a running locked fashion.       Attention was then turned to the anterior vaginal wall by placing a weighted speculum in the posterior vagina and injecting the vaginal mucosa with Pitressin in injectable saline.  A midline surgical incision was made the entire length of the anterior  vaginal wall with a scalpel and the edges of this incision grasped with Allis clamps.  Again, the excess vaginal mucosa was dissected away from the underlying vaginal wall using Metzenbaum scissors.  Midline plication was performed using 3-0 Vicryl pop-off sutures in an interrupted fashion.  Excess vaginal mucosa was trimmed from the edges of the incision using curved Cherry scissors.  The entire length of the anterior vaginal wall incision was then reapproximated with 2-0 Vicryl suture in a running locked fashion.       At the conclusion of the case the patient had good anterior and posterior wall support.  The caliber of the vagina allowed 2 fingers, [id number], without any narrowing or stricturing at any point along the length of the vaginal canal.  The patient's vagina was filled with Premarin vaginal cream and vaginal packing material.  Sponge, lap and needle counts were correct ×2.  The patient tolerated the procedure well, and was taken to the recovery room in stable condition.      Yue Morales MD   4/22/2024  13:22 CDT

## 2024-04-22 NOTE — ANESTHESIA PREPROCEDURE EVALUATION
Anesthesia Evaluation     Patient summary reviewed   no history of anesthetic complications:   NPO Solid Status: > 8 hours             Airway   Mallampati: II  Dental      Pulmonary    (-) COPD, asthma, sleep apnea, not a smoker  Cardiovascular   Exercise tolerance: good (4-7 METS)    (+) hypertension, hyperlipidemia  (-) pacemaker, past MI, angina, cardiac stents      Neuro/Psych  (-) seizures, TIA, CVA  GI/Hepatic/Renal/Endo    (+) renal disease- CRI, thyroid problem hypothyroidism  (-) GERD, liver disease, diabetes    Musculoskeletal     Abdominal    Substance History      OB/GYN          Other                    Anesthesia Plan    ASA 3     general     intravenous induction     Anesthetic plan, risks, benefits, and alternatives have been provided, discussed and informed consent has been obtained with: patient.    CODE STATUS:

## 2024-04-22 NOTE — ANESTHESIA POSTPROCEDURE EVALUATION
"Patient: Laura Conner    Procedure Summary       Date: 04/22/24 Room / Location:  [facility] OR 09 /  [facility] OR    Anesthesia Start: 1217 Anesthesia Stop: 1324    Procedure: ANTERIOR AND POSTERIOR VAGINAL REPAIR (Vagina) Diagnosis:       Rectocele      (Rectocele [N81.6])    Surgeons: Yue Morales MD Provider: Sheree Duncan CRNA    Anesthesia Type: general ASA Status: 3            Anesthesia Type: general    Vitals  Vitals Value Taken Time   /67 04/22/24 1410   Temp 97.1 °F (36.2 °C) 04/22/24 1410   Pulse 74 04/22/24 1416   Resp 15 04/22/24 1415   SpO2 95 % 04/22/24 1416   Vitals shown include unfiled device data.        Post Anesthesia Care and Evaluation    PONV Status: none  Comments: Patient d/c from PACU prior to anes eval based on Jumana score.  Please see RN notes for details of d/c criteria.    Blood pressure 129/64, pulse 68, temperature 96.9 °F (36.1 °C), temperature source Temporal, resp. rate 16, height 160 cm (62.99\"), weight 75.3 kg (166 lb 0.1 oz), SpO2 96%, not currently breastfeeding.        "

## 2024-04-22 NOTE — ANESTHESIA PROCEDURE NOTES
Airway  Urgency: elective    Date/Time: 4/22/2024 12:22 PM  Airway not difficult    General Information and Staff    Patient location during procedure: OR  CRNA/CAA: Elsa Camejo CRNA    Indications and Patient Condition  Indications for airway management: airway protection    Preoxygenated: yes  Mask difficulty assessment: 1 - vent by mask    Final Airway Details  Final airway type: endotracheal airway      Successful airway: ETT  Cuffed: yes   Successful intubation technique: direct laryngoscopy and video laryngoscopy  Facilitating devices/methods: intubating stylet  Endotracheal tube insertion site: oral  Blade: Garcia  Blade size: 3  ETT size (mm): 7.0  Cormack-Lehane Classification: grade I - full view of glottis  Placement verified by: chest auscultation and capnometry   Cuff volume (mL): 5  Measured from: lips  ETT/EBT  to lips (cm): 22  Number of attempts at approach: 1  Assessment: lips, teeth, and gum same as pre-op and atraumatic intubation

## 2024-04-22 NOTE — PLAN OF CARE
Goal Outcome Evaluation:           Progress: improving  Outcome Evaluation: VSS, packing intact, morris with good urine output, IV infusing, pt to eat dinner then ambulate in miller, rates pain high, but wanted to wait until after ambulating to take another pain pill.

## 2024-04-23 VITALS
HEART RATE: 57 BPM | TEMPERATURE: 98.2 F | OXYGEN SATURATION: 94 % | SYSTOLIC BLOOD PRESSURE: 131 MMHG | WEIGHT: 166.01 LBS | HEIGHT: 63 IN | DIASTOLIC BLOOD PRESSURE: 67 MMHG | BODY MASS INDEX: 29.41 KG/M2 | RESPIRATION RATE: 18 BRPM

## 2024-04-23 PROBLEM — N81.6 RECTOCELE, FEMALE: Status: RESOLVED | Noted: 2024-04-22 | Resolved: 2024-04-23

## 2024-04-23 PROBLEM — N81.6 RECTOCELE: Status: RESOLVED | Noted: 2024-04-05 | Resolved: 2024-04-23

## 2024-04-23 PROBLEM — N81.10 FEMALE CYSTOCELE: Status: RESOLVED | Noted: 2024-04-05 | Resolved: 2024-04-23

## 2024-04-23 LAB
DEPRECATED RDW RBC AUTO: 44.9 FL (ref 37–54)
ERYTHROCYTE [DISTWIDTH] IN BLOOD BY AUTOMATED COUNT: 13.7 % (ref 12.3–15.4)
HCT VFR BLD AUTO: 37.3 % (ref 34–46.6)
HGB BLD-MCNC: 12 G/DL (ref 12–15.9)
MCH RBC QN AUTO: 28.8 PG (ref 26.6–33)
MCHC RBC AUTO-ENTMCNC: 32.2 G/DL (ref 31.5–35.7)
MCV RBC AUTO: 89.7 FL (ref 79–97)
PLATELET # BLD AUTO: 228 10*3/MM3 (ref 140–450)
PMV BLD AUTO: 10.3 FL (ref 6–12)
RBC # BLD AUTO: 4.16 10*6/MM3 (ref 3.77–5.28)
WBC NRBC COR # BLD AUTO: 15.06 10*3/MM3 (ref 3.4–10.8)

## 2024-04-23 PROCEDURE — 85027 COMPLETE CBC AUTOMATED: CPT | Performed by: OBSTETRICS & GYNECOLOGY

## 2024-04-23 PROCEDURE — 94799 UNLISTED PULMONARY SVC/PX: CPT

## 2024-04-23 PROCEDURE — A9270 NON-COVERED ITEM OR SERVICE: HCPCS | Performed by: OBSTETRICS & GYNECOLOGY

## 2024-04-23 PROCEDURE — 25010000002 ONDANSETRON PER 1 MG: Performed by: OBSTETRICS & GYNECOLOGY

## 2024-04-23 PROCEDURE — 25010000002 CEFAZOLIN PER 500 MG: Performed by: OBSTETRICS & GYNECOLOGY

## 2024-04-23 PROCEDURE — 63710000001 OXYCODONE-ACETAMINOPHEN 5-325 MG TABLET: Performed by: OBSTETRICS & GYNECOLOGY

## 2024-04-23 RX ORDER — LACTULOSE 10 G/15ML
20 SOLUTION ORAL 2 TIMES DAILY
Qty: 946 ML | Refills: 1 | Status: SHIPPED | OUTPATIENT
Start: 2024-04-23

## 2024-04-23 RX ORDER — OXYCODONE HYDROCHLORIDE AND ACETAMINOPHEN 5; 325 MG/1; MG/1
1 TABLET ORAL EVERY 6 HOURS PRN
Qty: 12 TABLET | Refills: 0 | Status: SHIPPED | OUTPATIENT
Start: 2024-04-23

## 2024-04-23 RX ORDER — ONDANSETRON 4 MG/1
4 TABLET, ORALLY DISINTEGRATING ORAL EVERY 8 HOURS PRN
Qty: 30 TABLET | Refills: 0 | Status: SHIPPED | OUTPATIENT
Start: 2024-04-23

## 2024-04-23 RX ADMIN — OXYCODONE AND ACETAMINOPHEN 1 TABLET: 5; 325 TABLET ORAL at 12:56

## 2024-04-23 RX ADMIN — ONDANSETRON 4 MG: 2 INJECTION INTRAMUSCULAR; INTRAVENOUS at 10:00

## 2024-04-23 RX ADMIN — SODIUM CHLORIDE 1000 MG: 900 INJECTION INTRAVENOUS at 03:50

## 2024-04-23 NOTE — PROGRESS NOTES
Jessica Conner  : 1945  MRN: [id number]  CSN: [id number]    Post-operative Day #1  Subjective   Medications being utilized for pain control: ibuprofen (OTC) and Percocet.  Patient reports pain is well-controlled.  She is passing gas and has not had a bowel movement.  Patient is having spotting.  Her morris catheter +/- vaginal packing have already been removed.  She has not yet needed to void.         Objective     Min/max vitals past 24 hours:   Temp  Min: 96.9 °F (36.1 °C)  Max: 98.2 °F (36.8 °C)  BP  Min: 107/55  Max: 170/80  Pulse  Min: 55  Max: 83  Resp  Min: 12  Max: 18        I/O last 3 completed shifts:  In: 1090 [P.O.:240; I.V.:750; IV Piggyback:100]  Out: 780 [Urine:780]    General: well developed; well nourished  no acute distress   Lungs:  Abdomen: breathing is unlabored  soft, non-tender; no masses   Pelvic: Not performed   Ext: Calves NT     WBC   Date/Time Value Ref Range Status   2024 0436 15.06 (H) 3.40 - 10.80 10*3/mm3 Final   10/19/2022 025 9.4 4.8 - 10.8 K/uL Final     Hemoglobin   Date/Time Value Ref Range Status   20246 12.0 12.0 - 15.9 g/dL Final   10/19/2022 0256 10.9 (L) 12.0 - 16.0 g/dL Final     Hematocrit   Date/Time Value Ref Range Status   2024 0436 37.3 34.0 - 46.6 % Final   10/19/2022 0256 33.1 (L) 37.0 - 47.0 % Final     Platelets   Date/Time Value Ref Range Status   2024 228 140 - 450 10*3/mm3 Final   10/19/2022 0256 390 130 - 400 K/uL Final        Assessment   Post-op Day #1 S/P AR/DE  Doing well     Plan   Discharge to home  D/C questions all answered.  Restrictions reviewed  Follow-up appointment in 2 week(s)    Yue Morales MD  2024  08:52 CDT

## 2024-04-23 NOTE — PLAN OF CARE
Goal Outcome Evaluation:  Plan of Care Reviewed With: patient        Progress: improving  Outcome Evaluation: VSS. 2L o2 while sleeping. DTV. vaginal packing removed, tolerated well. ambulated in room. prn pain medications given to control pain. no bp on left arm.

## 2024-04-23 NOTE — DISCHARGE SUMMARY
" Jessica Conner  : 1945  MRN: [id number]  CSN: [id number]    Discharge Summary      Date of Admission: 2024   Date of Discharge: 2024   Discharge Diagnosis: S/p gyn surgery for prolapse   Procedures Performed: Procedure(s):  ANTERIOR AND POSTERIOR VAGINAL REPAIR      Consults: none   Brief History: Patient is a 78 y.o.who presented for scheduled surgical correction.     Hospital Course: Uncomplicated.  Patient was ready for discharge on POD #1.     Pending Studies: None.     Condition at discharge: gradually improving   Discharge Medications:    Your medication list        START taking these medications        Instructions Last Dose Given Next Dose Due   lactulose 10 GM/15ML solution  Commonly known as: CHRONULAC      Take 30 mL by mouth 2 (Two) Times a Day.       oxyCODONE-acetaminophen 5-325 MG per tablet  Commonly known as: Percocet      Take 1 tablet by mouth Every 6 (Six) Hours As Needed for Severe Pain.              CONTINUE taking these medications        Instructions Last Dose Given Next Dose Due   allopurinol 100 MG tablet  Commonly known as: ZYLOPRIM      Take 2 tablets by mouth Daily.       Cholecalciferol 50 MCG (2000 UT) tablet      Take 1 tablet by mouth Daily.       clobetasol propionate 0.05 % cream  Commonly known as: Temovate      Apply 1 application topically to the appropriate area as directed 2 (Two) Times a Day.       cyclobenzaprine 5 MG tablet  Commonly known as: FLEXERIL      Take 1 tablet by mouth 3 (Three) Times a Day As Needed for Muscle Spasms.       dexAMETHasone 0.5 MG/5ML solution      Take 10 mL by mouth 2 (Two) Times a Day As Needed. Mouthwash for autoimmune disorder       Farxiga 10 MG tablet  Generic drug: dapagliflozin Propanediol      Take 10 mg by mouth Daily. Pt states, \"not for diabetes it is to protect my kidneys\"       olmesartan-hydrochlorothiazide 40-12.5 MG per tablet  Commonly known as: BENICAR HCT      Take 1 tablet by mouth Daily.     "   simvastatin 20 MG tablet  Commonly known as: ZOCOR      Take 1 tablet by mouth Every Night.       Synthroid 88 MCG tablet  Generic drug: levothyroxine      Take 1 tablet by mouth Daily.       TURMERIC PO      Take 1 tablet by mouth Daily.                 Where to Get Your Medications        These medications were sent to [facility]/pharmacy #[id number] - [place], [place] - [address] - [phone]  - [phone]  [address] [address]      Phone: [phone]   lactulose 10 GM/15ML solution  oxyCODONE-acetaminophen 5-325 MG per tablet        Discharge Disposition: home   Follow-up: Future Appointments   Date Time Provider Department Center   5/7/2024 11:30 AM Yue Morales MD [facility]            This note has been electronically signed.    Yue Morales MD  April 23, 2024  08:56 CDT

## 2024-05-03 ENCOUNTER — TELEPHONE (OUTPATIENT)
Dept: OBSTETRICS AND GYNECOLOGY | Age: 79
End: 2024-05-03
Payer: MEDICARE

## 2024-05-03 RX ORDER — LACTULOSE 10 G/15ML
10 SOLUTION ORAL 2 TIMES DAILY
Status: CANCELLED | OUTPATIENT
Start: 2024-05-03

## 2024-05-04 RX ORDER — LACTULOSE 20 G/30ML
20 SOLUTION ORAL 2 TIMES DAILY
Qty: 946 ML | Refills: 1 | Status: SHIPPED | OUTPATIENT
Start: 2024-05-04

## 2024-05-07 ENCOUNTER — OFFICE VISIT (OUTPATIENT)
Dept: OBSTETRICS AND GYNECOLOGY | Age: 79
End: 2024-05-07
Payer: MEDICARE

## 2024-05-07 VITALS
BODY MASS INDEX: 28.7 KG/M2 | WEIGHT: 162 LBS | HEIGHT: 63 IN | SYSTOLIC BLOOD PRESSURE: 132 MMHG | DIASTOLIC BLOOD PRESSURE: 82 MMHG

## 2024-05-07 DIAGNOSIS — Z09 S/P GYNECOLOGICAL SURGERY, FOLLOW-UP EXAM: Primary | ICD-10-CM

## 2024-06-11 ENCOUNTER — OFFICE VISIT (OUTPATIENT)
Dept: OBSTETRICS AND GYNECOLOGY | Age: 79
End: 2024-06-11
Payer: MEDICARE

## 2024-06-11 ENCOUNTER — TELEPHONE (OUTPATIENT)
Dept: OBSTETRICS AND GYNECOLOGY | Age: 79
End: 2024-06-11
Payer: MEDICARE

## 2024-06-11 VITALS
SYSTOLIC BLOOD PRESSURE: 114 MMHG | HEIGHT: 62 IN | DIASTOLIC BLOOD PRESSURE: 74 MMHG | BODY MASS INDEX: 29.81 KG/M2 | WEIGHT: 162 LBS

## 2024-06-11 DIAGNOSIS — Z09 S/P GYNECOLOGICAL SURGERY, FOLLOW-UP EXAM: Primary | ICD-10-CM

## 2024-06-11 DIAGNOSIS — R32 URINARY INCONTINENCE, UNSPECIFIED TYPE: ICD-10-CM

## 2024-06-11 DIAGNOSIS — N32.81 DETRUSOR OVERACTIVITY: ICD-10-CM

## 2024-06-11 PROCEDURE — 3078F DIAST BP <80 MM HG: CPT | Performed by: OBSTETRICS & GYNECOLOGY

## 2024-06-11 PROCEDURE — 3074F SYST BP LT 130 MM HG: CPT | Performed by: OBSTETRICS & GYNECOLOGY

## 2024-06-11 PROCEDURE — 99024 POSTOP FOLLOW-UP VISIT: CPT | Performed by: OBSTETRICS & GYNECOLOGY

## 2024-06-11 RX ORDER — PHENOL 1.4 %
600 AEROSOL, SPRAY (ML) MUCOUS MEMBRANE DAILY
COMMUNITY

## 2024-06-11 NOTE — PROGRESS NOTES
"Subjective   Chief Complaint   Patient presents with    Post-op     Pt here today for 6 week post op AR/MT. Pt voices having some discomfort in her left abdomen. Pt voices no other concerns.      Laura Conner is a 78 y.o. year old  presenting to be seen for her post-operative visit.  She had a repair of cystocele (AR) and repair of rectocele (MT) 6 weeks ago.  Currently she reports pain for only a few days after surgery.  She is still having a little discomfort in L flank, but says it only bothers her when she is more active - it is definitely improving with time.  She is not having any vaginal bleeding, and does not have any vaginal discharge.  There is emptying well, and bowel movements are passing much more easily.  Patient is not getting up multiple times during the night, like she used to, and is very pleased with this.  She is, however, having more urinary incontinence that she was during the day.  She reports that during the day, she will have \"almost constant\" small gushes of urine.    No Additional Complaints Reported    The following portions of the patient's history were reviewed and updated as appropriate:current medications and allergies    Review of Systems      Objective   /74   Ht 157.5 cm (62\")   Wt 73.5 kg (162 lb)   LMP  (LMP Unknown)   BMI 29.63 kg/m²     General:  well developed; well nourished  no acute distress   Abdomen: soft, non-tender; no masses   Pelvis: Clinical staff was present for exam.  Anterior and posterior incisions healed.  Patient has good support.  Speculum exam still minimally tender          Assessment   Pt is 6 weeks s/p repair of cystocele (AR) and repair of rectocele (MT)  Doing well     Plan   No restrictions now  RTO for PNE trial.  Patient will be contacted with a date.  She took anticholinergics in the past but quit due to side effects.  She now has stage III (or possibly stage II, patient reports there has been improvement) kidney disease and " cannot be on anticholinergics again.  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    No orders of the defined types were placed in this encounter.         This note was electronically signed.    Yue Morales MD  June 11, 2024  Answers submitted by the patient for this visit:  Other (Submitted on 5/2/2024)  Please describe your symptoms.: Post-0pt  Have you had these symptoms before?: No  How long have you been having these symptoms?: 1-2 weeks  Please list any medications you are currently taking for this condition.: No change from the last check in  Please describe any probable cause for these symptoms. : Surgery  Primary Reason for Visit (Submitted on 5/2/2024)  What is the primary reason for your visit?: Other    Answers submitted by the patient for this visit:  Other (Submitted on 6/11/2024)  Please describe your symptoms.: Follow up from surgery  Have you had these symptoms before?: No  How long have you been having these symptoms?: Greater than 2 weeks  Please list any medications you are currently taking for this condition.: Same as last time  Primary Reason for Visit (Submitted on 6/11/2024)  What is the primary reason for your visit?: Other

## 2024-06-26 ENCOUNTER — OFFICE VISIT (OUTPATIENT)
Dept: OBSTETRICS AND GYNECOLOGY | Age: 79
End: 2024-06-26
Payer: MEDICARE

## 2024-06-26 VITALS
WEIGHT: 165 LBS | HEIGHT: 62 IN | DIASTOLIC BLOOD PRESSURE: 90 MMHG | BODY MASS INDEX: 30.36 KG/M2 | SYSTOLIC BLOOD PRESSURE: 140 MMHG

## 2024-06-26 DIAGNOSIS — N32.81 DETRUSOR OVERACTIVITY: ICD-10-CM

## 2024-06-26 DIAGNOSIS — R35.0 URINARY FREQUENCY: ICD-10-CM

## 2024-06-26 DIAGNOSIS — R32 URINARY INCONTINENCE, UNSPECIFIED TYPE: Primary | ICD-10-CM

## 2024-06-26 NOTE — PROGRESS NOTES
"Subjective   Chief Complaint   Patient presents with    Urinary Incontinence     Pt here today for interstim PNE trial.     Laura Conner is a 79 y.o. year old .  No LMP recorded (lmp unknown). Patient has had a hysterectomy.  She presents to be seen because of urinary incontinence.  The patient had an anterior and posterior vaginal repair in April.  While this fixed her pressure and prolapse, she reported urge incontinence symptoms to be worse after that.  The patient has stage III chronic kidney disease and cannot take anticholinergic medications, so she is here to have a temporary PNE placed and consideration for permanent implantation with an InterStim device.    The following portions of the patient's history were reviewed and updated as appropriate:current medications and allergies    Social History    Tobacco Use      Smoking status: Never      Smokeless tobacco: Never    Review of Systems   Constitutional:  Negative for activity change and unexpected weight change.   Respiratory:  Negative for shortness of breath.    Cardiovascular:  Negative for chest pain.   Gastrointestinal:  Negative for abdominal pain, constipation and diarrhea.        No constipation, but does have stool trapping   Genitourinary:  Positive for enuresis, frequency and urgency. Negative for difficulty urinating, dysuria, pelvic pain and vaginal pain.        Not sexually active         Objective   /90   Ht 157.5 cm (62\")   Wt 74.8 kg (165 lb)   LMP  (LMP Unknown)   BMI 30.18 kg/m²     Physical Exam  Vitals and nursing note reviewed.   Constitutional:       General: She is not in acute distress.     Appearance: She is well-developed.   HENT:      Head: Normocephalic and atraumatic.   Neck:      Thyroid: No thyromegaly.   Pulmonary:      Effort: Pulmonary effort is normal.   Genitourinary:     Comments: Patient prepped and draped in the prone position.  Anatomical landmarks were used to jose the patient; she was then " injected with lidocaine.  Extra lidocaine was added, as the patient was having difficulty tolerating the procedure.  In the end, a lead wire was placed in the S2 foramen on the patient's left side in the S3 foramen on the patient's right side; no further adjustments were made because the patient could not tolerate it.  Lead wires were covered with Tegaderm.  The patient was given instructions for the trial period, and will return to the office on Monday.  Musculoskeletal:      Cervical back: Normal range of motion.   Skin:     General: Skin is warm and dry.   Neurological:      Mental Status: She is alert and oriented to person, place, and time.   Psychiatric:         Behavior: Behavior normal.         Judgment: Judgment normal.         Lab Review   No data reviewed    Imaging   No data reviewed     Assessment & Plan    Diagnoses and all orders for this visit:    1. Urinary incontinence, unspecified type (Primary): Temporary PNE leads placed today in the office.  Patient tolerated well with some discomfort.  Instructions given.  Patient will return to the office on Monday for removal.    2. Detrusor overactivity    3. Urinary frequency       This note was electronically signed.    Yue Morales MD  June 26, 2024  16:05 CDT

## 2024-07-01 ENCOUNTER — CLINICAL SUPPORT (OUTPATIENT)
Dept: OBSTETRICS AND GYNECOLOGY | Age: 79
End: 2024-07-01
Payer: MEDICARE

## 2024-07-05 ENCOUNTER — TELEPHONE (OUTPATIENT)
Dept: OBSTETRICS AND GYNECOLOGY | Age: 79
End: 2024-07-05
Payer: MEDICARE

## 2024-07-05 DIAGNOSIS — Z01.818 PRE-OP EVALUATION: ICD-10-CM

## 2024-07-05 DIAGNOSIS — N32.81 DETRUSOR OVERACTIVITY: Primary | ICD-10-CM

## 2024-07-05 RX ORDER — ACETAMINOPHEN 500 MG
1000 TABLET ORAL ONCE
OUTPATIENT
Start: 2024-07-05 | End: 2024-07-05

## 2024-07-05 RX ORDER — SODIUM CHLORIDE 0.9 % (FLUSH) 0.9 %
10 SYRINGE (ML) INJECTION AS NEEDED
OUTPATIENT
Start: 2024-07-05

## 2024-07-05 RX ORDER — SODIUM CHLORIDE 0.9 % (FLUSH) 0.9 %
10 SYRINGE (ML) INJECTION EVERY 12 HOURS SCHEDULED
OUTPATIENT
Start: 2024-07-05

## 2024-07-05 RX ORDER — SODIUM CHLORIDE 9 MG/ML
40 INJECTION, SOLUTION INTRAVENOUS AS NEEDED
OUTPATIENT
Start: 2024-07-05

## 2024-07-05 RX ORDER — GABAPENTIN 100 MG/1
600 CAPSULE ORAL ONCE
OUTPATIENT
Start: 2024-07-05 | End: 2024-07-05

## 2024-07-05 NOTE — TELEPHONE ENCOUNTER
Pt advised of surgery scheduled with Dr Morales on 8-5-24. Pt to arrive at 9:00am  for an 11:00am surgery. Pt also scheduled for pre op labs on 7-22-24 @ 1:45pm. Pt understood all.

## 2024-07-12 ENCOUNTER — TELEPHONE (OUTPATIENT)
Dept: OBSTETRICS AND GYNECOLOGY | Age: 79
End: 2024-07-12
Payer: MEDICARE

## 2024-07-12 NOTE — TELEPHONE ENCOUNTER
Pt notified of 2 week post op visit with Dr Morales that's scheduled on 8-22-24 @ 11:00am. Pt understood.

## 2024-07-22 ENCOUNTER — PRE-ADMISSION TESTING (OUTPATIENT)
Dept: PREADMISSION TESTING | Facility: HOSPITAL | Age: 79
End: 2024-07-22
Payer: MEDICARE

## 2024-07-22 VITALS
HEIGHT: 63 IN | DIASTOLIC BLOOD PRESSURE: 63 MMHG | WEIGHT: 166.67 LBS | BODY MASS INDEX: 29.53 KG/M2 | OXYGEN SATURATION: 96 % | SYSTOLIC BLOOD PRESSURE: 123 MMHG | RESPIRATION RATE: 16 BRPM | HEART RATE: 71 BPM

## 2024-07-22 DIAGNOSIS — Z01.818 PRE-OP EVALUATION: ICD-10-CM

## 2024-07-22 DIAGNOSIS — N32.81 DETRUSOR OVERACTIVITY: ICD-10-CM

## 2024-07-22 LAB
ANION GAP SERPL CALCULATED.3IONS-SCNC: 9 MMOL/L (ref 5–15)
BASOPHILS # BLD AUTO: 0.07 10*3/MM3 (ref 0–0.2)
BASOPHILS NFR BLD AUTO: 0.9 % (ref 0–1.5)
BUN SERPL-MCNC: 28 MG/DL (ref 8–23)
BUN/CREAT SERPL: 23.5 (ref 7–25)
CALCIUM SPEC-SCNC: 10.1 MG/DL (ref 8.6–10.5)
CHLORIDE SERPL-SCNC: 99 MMOL/L (ref 98–107)
CO2 SERPL-SCNC: 31 MMOL/L (ref 22–29)
CREAT SERPL-MCNC: 1.19 MG/DL (ref 0.57–1)
DEPRECATED RDW RBC AUTO: 44.1 FL (ref 37–54)
EGFRCR SERPLBLD CKD-EPI 2021: 46.3 ML/MIN/1.73
EOSINOPHIL # BLD AUTO: 0.57 10*3/MM3 (ref 0–0.4)
EOSINOPHIL NFR BLD AUTO: 7.5 % (ref 0.3–6.2)
ERYTHROCYTE [DISTWIDTH] IN BLOOD BY AUTOMATED COUNT: 13.7 % (ref 12.3–15.4)
GLUCOSE SERPL-MCNC: 96 MG/DL (ref 65–99)
HCT VFR BLD AUTO: 44.2 % (ref 34–46.6)
HGB BLD-MCNC: 14.3 G/DL (ref 12–15.9)
IMM GRANULOCYTES # BLD AUTO: 0.02 10*3/MM3 (ref 0–0.05)
IMM GRANULOCYTES NFR BLD AUTO: 0.3 % (ref 0–0.5)
LYMPHOCYTES # BLD AUTO: 2.66 10*3/MM3 (ref 0.7–3.1)
LYMPHOCYTES NFR BLD AUTO: 35.1 % (ref 19.6–45.3)
MCH RBC QN AUTO: 28.3 PG (ref 26.6–33)
MCHC RBC AUTO-ENTMCNC: 32.4 G/DL (ref 31.5–35.7)
MCV RBC AUTO: 87.5 FL (ref 79–97)
MONOCYTES # BLD AUTO: 0.59 10*3/MM3 (ref 0.1–0.9)
MONOCYTES NFR BLD AUTO: 7.8 % (ref 5–12)
NEUTROPHILS NFR BLD AUTO: 3.67 10*3/MM3 (ref 1.7–7)
NEUTROPHILS NFR BLD AUTO: 48.4 % (ref 42.7–76)
NRBC BLD AUTO-RTO: 0 /100 WBC (ref 0–0.2)
PLATELET # BLD AUTO: 301 10*3/MM3 (ref 140–450)
PMV BLD AUTO: 10.1 FL (ref 6–12)
POTASSIUM SERPL-SCNC: 3.6 MMOL/L (ref 3.5–5.2)
RBC # BLD AUTO: 5.05 10*6/MM3 (ref 3.77–5.28)
SODIUM SERPL-SCNC: 139 MMOL/L (ref 136–145)
WBC NRBC COR # BLD AUTO: 7.58 10*3/MM3 (ref 3.4–10.8)

## 2024-07-22 PROCEDURE — 80048 BASIC METABOLIC PNL TOTAL CA: CPT

## 2024-07-22 PROCEDURE — 36415 COLL VENOUS BLD VENIPUNCTURE: CPT

## 2024-07-22 PROCEDURE — 85025 COMPLETE CBC W/AUTO DIFF WBC: CPT | Performed by: OBSTETRICS & GYNECOLOGY

## 2024-07-22 PROCEDURE — 93005 ELECTROCARDIOGRAM TRACING: CPT | Performed by: OBSTETRICS & GYNECOLOGY

## 2024-07-22 NOTE — DISCHARGE INSTRUCTIONS
Preparing for Surgery  Follow these instructions before the procedure:  Several days or weeks before your procedure    Ask your health care provider about:  Changing or stopping your regular medicines. This is especially important if you are taking diabetes medicines or blood thinners.  Taking medicines such as aspirin and ibuprofen. These medicines can thin your blood. Do not take these medicines unless your health care provider tells you to take them.  Taking over-the-counter medicines, vitamins, herbs, and supplements.    Contact your surgeon if you:  Develop a fever of more than 100.4°F (38°C) or other feelings of illness during the 48 hours before your surgery.  Have symptoms that get worse.  Have questions or concerns about your surgery.  If you are going home the same day of your surgery you will need to arrange for a responsible adult, age 18 years old or older, to drive you home from the hospital and stay with you for 24 hours. Verification of the  will be made prior to any procedure requiring sedation. You may not go home in a taxi or any form of public transportation by yourself.     Day before your procedure  Medication(s) you need to stop the day before your surgery: OLMESARTAN (BENICAR)    24 hours before your procedure DO NOT drink alcoholic beverages or smoke.  You may be asked to shower with a germ-killing soap.  Day of your procedure   You may take the following medication(s) the morning of surgery with a sip of water: AS DIRECTED BY       8 hours before your procedure STOP all food, any dairy products, and full liquids. This includes hard candy, chewing gum or mints. This is extremely important to prevent serious complications.     Up to 2 hours before your scheduled arrival time, you may have clear liquids no cream, powder, or pulp of any kind. Safe options are water, black coffee, plain tea, soda, Gatorade/Powerade, clear broth, apple juice.    2 hours before your scheduled arrival time,  STOP drinking clear liquids.    You may need to take another shower with a germ-killing soap before you leave home in the morning. Do not use perfumes, colognes, or body lotions.  Wear comfortable loose-fitting clothing.  Remove all jewelry including body piercing and rings, dark colored nail polish, and make up prior to arrival at the hospital. Leave all valuables at home.   Bring your hearing aids if you rely on them.  Do not wear contact lenses. If you wear eyeglasses remember to bring a case to store them in while you are in surgery.  Do not use denture adhesives since you will be asked to remove them during your surgery.    You do not need to bring your home medications into the hospital.   Bring your sleep apnea device with you on the day of your surgery (if this applies to you).  If you wear portable oxygen, bring it with you.   If you are staying overnight, you may bring a bag of items you may need such as slippers, robe and a change of clothes for your discharge. You may want to leave these items in the car until you are ready for them since your family will take your belongings when you leave the pre-operative area.  Arrive at the hospital as scheduled by the office. You will be asked to arrive 2 hours prior to your surgery time in order to prepare for your procedure.  When you arrive at the hospital  Go to the registration desk located at the main entrance of the hospital.  After registration is completed, you will be given a beeper and a sticker sheet. Take the stickers to Outpatient Surgery and place in the tray at the end of the desk to notify the staff that you have arrived and registered.   Return to the lobby to wait. You are not always called back according to the time of arrival but rather the time your doctor will be ready.  When your beeper lights up and vibrates proceed through the double doors, under the stairs, and a member of the Outpatient Surgery staff will escort you to your preoperative  room.         How to Use Chlorhexidine Before Surgery  Chlorhexidine gluconate (CHG) is a germ-killing (antiseptic) solution that is used to clean the skin. It can get rid of the bacteria that normally live on the skin and can keep them away for about 24 hours. To clean your skin with CHG, you may be given:  A CHG solution to use in the shower or as part of a sponge bath.  A prepackaged cloth that contains CHG.  Cleaning your skin with CHG may help lower the risk for infection:  While you are staying in the intensive care unit of the hospital.  If you have a vascular access, such as a central line, to provide short-term or long-term access to your veins.  If you have a catheter to drain urine from your bladder.  If you are on a ventilator. A ventilator is a machine that helps you breathe by moving air in and out of your lungs.  After surgery.  What are the risks?  Risks of using CHG include:  A skin reaction.  Hearing loss, if CHG gets in your ears and you have a perforated eardrum.  Eye injury, if CHG gets in your eyes and is not rinsed out.  The CHG product catching fire.  Make sure that you avoid smoking and flames after applying CHG to your skin.  Do not use CHG:  If you have a chlorhexidine allergy or have previously reacted to chlorhexidine.  On babies younger than 2 months of age.  How to use CHG solution  Use CHG only as told by your health care provider, and follow the instructions on the label.  Use the full amount of CHG as directed. Usually, this is one bottle.  During a shower    Follow these steps when using CHG solution during a shower (unless your health care provider gives you different instructions):  Start the shower.  Use your normal soap and shampoo to wash your face and hair.  Turn off the shower or move out of the shower stream.  Pour the CHG onto a clean washcloth. Do not use any type of brush or rough-edged sponge.  Starting at your neck, lather your body down to your toes. Make sure you  follow these instructions:  If you will be having surgery, pay special attention to the part of your body where you will be having surgery. Scrub this area for at least 1 minute.  Do not use CHG on your head or face. If the solution gets into your ears or eyes, rinse them well with water.  Avoid your genital area.  Avoid any areas of skin that have broken skin, cuts, or scrapes.  Scrub your back and under your arms. Make sure to wash skin folds.  Let the lather sit on your skin for 1-2 minutes or as long as told by your health care provider.  Thoroughly rinse your entire body in the shower. Make sure that all body creases and crevices are rinsed well.  Dry off with a clean towel. Do not put any substances on your body afterward--such as powder, lotion, or perfume--unless you are told to do so by your health care provider. Only use lotions that are recommended by the .  Put on clean clothes or pajamas.  If it is the night before your surgery, sleep in clean sheets.     During a sponge bath  Follow these steps when using CHG solution during a sponge bath (unless your health care provider gives you different instructions):  Use your normal soap and shampoo to wash your face and hair.  Pour the CHG onto a clean washcloth.  Starting at your neck, lather your body down to your toes. Make sure you follow these instructions:  If you will be having surgery, pay special attention to the part of your body where you will be having surgery. Scrub this area for at least 1 minute.  Do not use CHG on your head or face. If the solution gets into your ears or eyes, rinse them well with water.  Avoid your genital area.  Avoid any areas of skin that have broken skin, cuts, or scrapes.  Scrub your back and under your arms. Make sure to wash skin folds.  Let the lather sit on your skin for 1-2 minutes or as long as told by your health care provider.  Using a different clean, wet washcloth, thoroughly rinse your entire body.  Make sure that all body creases and crevices are rinsed well.  Dry off with a clean towel. Do not put any substances on your body afterward--such as powder, lotion, or perfume--unless you are told to do so by your health care provider. Only use lotions that are recommended by the .  Put on clean clothes or pajamas.  If it is the night before your surgery, sleep in clean sheets.  How to use CHG prepackaged cloths  Only use CHG cloths as told by your health care provider, and follow the instructions on the label.  Use the CHG cloth on clean, dry skin.  Do not use the CHG cloth on your head or face unless your health care provider tells you to.  When washing with the CHG cloth:  Avoid your genital area.  Avoid any areas of skin that have broken skin, cuts, or scrapes.  Before surgery    Follow these steps when using a CHG cloth to clean before surgery (unless your health care provider gives you different instructions):  Using the CHG cloth, vigorously scrub the part of your body where you will be having surgery. Scrub using a back-and-forth motion for 3 minutes. The area on your body should be completely wet with CHG when you are done scrubbing.  Do not rinse. Discard the cloth and let the area air-dry. Do not put any substances on the area afterward, such as powder, lotion, or perfume.  Put on clean clothes or pajamas.  If it is the night before your surgery, sleep in clean sheets.     For general bathing  Follow these steps when using CHG cloths for general bathing (unless your health care provider gives you different instructions).  Use a separate CHG cloth for each area of your body. Make sure you wash between any folds of skin and between your fingers and toes. Wash your body in the following order, switching to a new cloth after each step:  The front of your neck, shoulders, and chest.  Both of your arms, under your arms, and your hands.  Your stomach and groin area, avoiding the genitals.  Your right  leg and foot.  Your left leg and foot.  The back of your neck, your back, and your buttocks.  Do not rinse. Discard the cloth and let the area air-dry. Do not put any substances on your body afterward--such as powder, lotion, or perfume--unless you are told to do so by your health care provider. Only use lotions that are recommended by the .  Put on clean clothes or pajamas.  Contact a health care provider if:  Your skin gets irritated after scrubbing.  You have questions about using your solution or cloth.  You swallow any chlorhexidine. Call your local poison control center (1-322.186.6917 in the [place]  Get help right away if:  Your eyes itch badly, or they become very red or swollen.  Your skin itches badly and is red or swollen.  Your hearing changes.  You have trouble seeing.  You have swelling or tingling in your mouth or throat.  You have trouble breathing.  These symptoms may represent a serious problem that is an emergency. Do not wait to see if the symptoms will go away. Get medical help right away. Call your local emergency services (459 in the [place] Do not drive yourself to the hospital.  Summary  Chlorhexidine gluconate (CHG) is a germ-killing (antiseptic) solution that is used to clean the skin. Cleaning your skin with CHG may help to lower your risk for infection.  You may be given CHG to use for bathing. It may be in a bottle or in a prepackaged cloth to use on your skin. Carefully follow your health care provider's instructions and the instructions on the product label.  Do not use CHG if you have a chlorhexidine allergy.  Contact your health care provider if your skin gets irritated after scrubbing.  This information is not intended to replace advice given to you by your health care provider. Make sure you discuss any questions you have with your health care provider.  Document Revised: 04/17/2023 Document Reviewed: 02/28/2022  Elsevier Patient Education © 2023 [employer]

## 2024-07-23 LAB
QT INTERVAL: 402 MS
QTC INTERVAL: 418 MS

## 2024-07-29 ENCOUNTER — TELEPHONE (OUTPATIENT)
Dept: GASTROENTEROLOGY | Age: 79
End: 2024-07-29

## 2024-07-29 NOTE — TELEPHONE ENCOUNTER
Called patient to remind them of their [facility]  to arrive at 800     CONFIRMED  PATIENT HAS  INSTR & PRESCRIPTION

## 2024-08-01 ENCOUNTER — ANESTHESIA (OUTPATIENT)
Dept: OPERATING ROOM | Age: 79
End: 2024-08-01

## 2024-08-01 ENCOUNTER — HOSPITAL ENCOUNTER (OUTPATIENT)
Age: 79
Setting detail: OUTPATIENT SURGERY
Discharge: HOME | End: 2024-08-01
Attending: INTERNAL MEDICINE | Admitting: INTERNAL MEDICINE

## 2024-08-01 ENCOUNTER — ANESTHESIA EVENT (OUTPATIENT)
Dept: OPERATING ROOM | Age: 79
End: 2024-08-01

## 2024-08-01 ENCOUNTER — APPOINTMENT (OUTPATIENT)
Dept: OPERATING ROOM | Age: 79
End: 2024-08-01
Attending: INTERNAL MEDICINE

## 2024-08-01 ENCOUNTER — TELEPHONE (OUTPATIENT)
Dept: OBSTETRICS AND GYNECOLOGY | Age: 79
End: 2024-08-01
Payer: MEDICARE

## 2024-08-01 VITALS
TEMPERATURE: 98.1 F | OXYGEN SATURATION: 96 % | HEART RATE: 53 BPM | HEIGHT: 63 IN | DIASTOLIC BLOOD PRESSURE: 60 MMHG | WEIGHT: 160 LBS | BODY MASS INDEX: 28.35 KG/M2 | RESPIRATION RATE: 16 BRPM | SYSTOLIC BLOOD PRESSURE: 129 MMHG

## 2024-08-01 PROCEDURE — G0105 COLORECTAL SCRN; HI RISK IND: HCPCS

## 2024-08-01 RX ORDER — SODIUM CHLORIDE, SODIUM LACTATE, POTASSIUM CHLORIDE, CALCIUM CHLORIDE 600; 310; 30; 20 MG/100ML; MG/100ML; MG/100ML; MG/100ML
INJECTION, SOLUTION INTRAVENOUS CONTINUOUS PRN
Status: DISCONTINUED | OUTPATIENT
Start: 2024-08-01 | End: 2024-08-01 | Stop reason: SDUPTHER

## 2024-08-01 RX ORDER — SODIUM CHLORIDE, SODIUM LACTATE, POTASSIUM CHLORIDE, CALCIUM CHLORIDE 600; 310; 30; 20 MG/100ML; MG/100ML; MG/100ML; MG/100ML
INJECTION, SOLUTION INTRAVENOUS CONTINUOUS
Status: DISCONTINUED | OUTPATIENT
Start: 2024-08-01 | End: 2024-08-01 | Stop reason: HOSPADM

## 2024-08-01 RX ORDER — PROPOFOL 10 MG/ML
INJECTION, EMULSION INTRAVENOUS PRN
Status: DISCONTINUED | OUTPATIENT
Start: 2024-08-01 | End: 2024-08-01 | Stop reason: SDUPTHER

## 2024-08-01 RX ORDER — LIDOCAINE HYDROCHLORIDE 10 MG/ML
INJECTION, SOLUTION EPIDURAL; INFILTRATION; INTRACAUDAL; PERINEURAL PRN
Status: DISCONTINUED | OUTPATIENT
Start: 2024-08-01 | End: 2024-08-01 | Stop reason: SDUPTHER

## 2024-08-01 RX ADMIN — LIDOCAINE HYDROCHLORIDE 50 MG: 10 INJECTION, SOLUTION EPIDURAL; INFILTRATION; INTRACAUDAL; PERINEURAL at 09:34

## 2024-08-01 RX ADMIN — SODIUM CHLORIDE, SODIUM LACTATE, POTASSIUM CHLORIDE, CALCIUM CHLORIDE: 600; 310; 30; 20 INJECTION, SOLUTION INTRAVENOUS at 08:24

## 2024-08-01 RX ADMIN — SODIUM CHLORIDE, SODIUM LACTATE, POTASSIUM CHLORIDE, CALCIUM CHLORIDE: 600; 310; 30; 20 INJECTION, SOLUTION INTRAVENOUS at 09:26

## 2024-08-01 RX ADMIN — PROPOFOL 350 MG: 10 INJECTION, EMULSION INTRAVENOUS at 09:34

## 2024-08-01 ASSESSMENT — LIFESTYLE VARIABLES: SMOKING_STATUS: 0

## 2024-08-01 ASSESSMENT — PAIN - FUNCTIONAL ASSESSMENT
PAIN_FUNCTIONAL_ASSESSMENT: NONE - DENIES PAIN
PAIN_FUNCTIONAL_ASSESSMENT: 0-10

## 2024-08-01 ASSESSMENT — ENCOUNTER SYMPTOMS: SHORTNESS OF BREATH: 0

## 2024-08-01 NOTE — DISCHARGE INSTRUCTIONS
Recommendations:  1. Repeat colonoscopy: not necessary due to age and lack of polyps.    POST-OP ORDERS: ENDOSCOPY & COLONOSCOPY:    1. Rest today.    2. DO NOT eat or drink until wide awake; eat your usual diet today in moderate amount only.    3. DO NOT drive today.    4. Call physician if you have severe pain, vomiting, fever, rectal bleeding or black bowel movements.    5.  If a biopsy was taken or a polyp removed, you should expect to hear results in about 21 days.  If you have heard nothing from your physician by then, call the office for results.    6.  Discharge home when patient awake, vitals signs stable and tolerating liquids.    7. Call with questions or concerns [phone].

## 2024-08-01 NOTE — TELEPHONE ENCOUNTER
Pt advised of new arrival time on 8-5-24. Pt to arrive at 8:00am for surgery at 10:00am . Pt understood.

## 2024-08-01 NOTE — ANESTHESIA POSTPROCEDURE EVALUATION
Department of Anesthesiology  Postprocedure Note    Patient: Bushra Caballero  MRN: [id number]  YOB: 1945  Date of evaluation: 8/1/2024    Procedure Summary       Date: 08/01/24 Room / Location: [place] [address] / [facility] [place]    Anesthesia Start: 0926 Anesthesia Stop: 0952    Procedure: COLORECTAL CANCER SCREENING, NOT HIGH RISK (Abdomen) Diagnosis:       Screen for colon cancer      Hx of colonic polyps      (Screen for colon cancer [Z12.11])      (Hx of colonic polyps [Z86.010])    Surgeons: Flavio Church MD Responsible Provider: Diogenes Mary APRN - CRNA    Anesthesia Type: general, TIVA ASA Status: 3            Anesthesia Type: No value filed.    Sophie Phase I:      Sohpie Phase II:      Anesthesia Post Evaluation    Patient location during evaluation: bedside  Patient participation: complete - patient participated  Level of consciousness: sleepy but conscious  Pain score: 0  Airway patency: patent  Nausea & Vomiting: no nausea and no vomiting  Cardiovascular status: hemodynamically stable  Respiratory status: acceptable  Hydration status: euvolemic  Comments: /70   Pulse 72   Temp 98 °F (36.7 °C)   Resp 18   Ht 1.6 m (5' 3\")   Wt 72.6 kg (160 lb)   SpO2 99%   BMI 28.34 kg/m²     Pain management: adequate    No notable events documented.

## 2024-08-01 NOTE — ANESTHESIA PRE PROCEDURE
"Chief Complaint   Patient presents with     Consult     excess foreskin eafter circ.        Initial /72  Pulse 127  Ht 4' 0.03\" (122 cm)  Wt 58 lb 3.2 oz (26.4 kg)  BMI 17.74 kg/m2 Estimated body mass index is 17.74 kg/(m^2) as calculated from the following:    Height as of this encounter: 4' 0.03\" (122 cm).    Weight as of this encounter: 58 lb 3.2 oz (26.4 kg).  Medication Reconciliation: complete     Donna Lopez LPN      "
\"LABABO\"    Drug/Infectious Status (If Applicable):  No results found for: \"HIV\", \"HEPCAB\"    COVID-19 Screening (If Applicable):   Lab Results   Component Value Date/Time    COVID19 Not Detected 10/13/2022 09:20 AM    COVID19 Not Detected 11/08/2020 12:04 PM           Anesthesia Evaluation  Patient summary reviewed and Nursing notes reviewed   no history of anesthetic complications:   Airway: Mallampati: II  TM distance: >3 FB   Neck ROM: full  Mouth opening: > = 3 FB   Dental: normal exam         Pulmonary:Negative Pulmonary ROS and normal exam  breath sounds clear to auscultation      (-) shortness of breath and not a current smoker          Patient did not smoke on day of surgery.                 Cardiovascular:    (+) hypertension:, hyperlipidemia    (-) CAD,  angina and  CHF    NYHA Classification: I  ECG reviewed  Rhythm: regular  Rate: normal           Beta Blocker:  Not on Beta Blocker         Neuro/Psych:   Negative Neuro/Psych ROS     (-) seizures, CVA and depression/anxiety            GI/Hepatic/Renal: Neg GI/Hepatic/Renal ROS  (+) GERD:     (-) hiatal hernia       Endo/Other: Negative Endo/Other ROS   (+) hypothyroidism, blood dyscrasia: anemia, arthritis: OA..          Pt had PAT visit.       Abdominal:       Abdomen: soft.      Vascular:          Other Findings:             Anesthesia Plan      general and TIVA     ASA 3     (Iv zofran within 30 min of closing )  Induction: intravenous.      Anesthetic plan and risks discussed with patient.                        Orin Munson, JEANNA - CRNA   8/1/2024

## 2024-08-01 NOTE — OP NOTE
bowel. The colonoscope was removed from the patient, and the procedure was terminated.  Findings are listed below.        Findings:     The mucosa appeared normal throughout the entire examined colon.    There was evidence of diverticular disease throughout the left colon.     Retroflexion in the rectum was normal and revealed no further abnormalities.        Recommendations:  1. Repeat colonoscopy: not necessary due to age and lack of polyps.    Findings and recommendations were discussed w/ the patient. A copy of the images was provided.    I, Ana Paula Villa, am scribing for and in the presence of Dr. Flavio Church MD.  Electronically signed by Ana Paula Villa RN on 8/1/2024 at 7:58 AM    I personally performed the services described in this documentation as scribed by Ana Paula Villa, and it appears accurate and complete.     Flavio Church MD  8/1/2024

## 2024-08-01 NOTE — H&P
Patient Name: Bushra Caballero  : 1945  MRN: [id number]  DATE: 24    Allergies:   Allergies   Allergen Reactions    Ciprofloxacin Other (See Comments)     Kidney failure    Morphine      Very Restless        ENDOSCOPY  History and Physical    Procedure:    [] Diagnostic Colonoscopy       [x] Screening Colonoscopy  [] EGD      [] ERCP      [] EUS       [] Other    [x] Previous office notes/History and Physical reviewed from the patients chart. Please see EMR for further details of HPI. I have examined the patient's status immediately prior to the procedure and:      Indications/HPI:       [x] Screening              [x] History of Polyps      []Fhx of colon CA/polyps []+Cologard/DNA/Stool Testing      Anesthesia:   [x] MAC [] Moderate Sedation   [] General   [] None     ROS: 12 pt review of systems was negative unless stated above    Medications:   Prior to Admission medications    Medication Sig Start Date End Date Taking? Authorizing Provider   FARXIGA 10 MG tablet TAKE 1 TABLET BY MOUTH EVERY DAY 23   Kareen Santacruz MD   omeprazole (PRILOSEC) 40 MG delayed release capsule TAKE 1 CAPSULE BY MOUTH EVERY DAY IN THE MORNING BEFORE BREAKFAST 23   Jenelle Payton, APRN - NP   levothyroxine (SYNTHROID) 75 MCG tablet Take 1 tablet by mouth Daily 10/20/22   Dayday Huang MD   allopurinol (ZYLOPRIM) 100 MG tablet Take 200 mg by mouth daily 22   Kareen Santacruz MD   olmesartan-hydroCHLOROthiazide (BENICAR HCT) 40-12.5 MG per tablet Take 1 tablet by mouth daily    Kareen Santacruz MD   simvastatin (ZOCOR) 20 MG tablet Take 20 mg by mouth nightly     ProviderKareen MD       Past Medical History:  Past Medical History:   Diagnosis Date    Acute renal failure (HCC) 2016    discharged 16; \"due to strong antibiotic and being dehydrated\"    Arthritis     Breast cancer (HCC)     surgery only    Cancer (HCC)     Breast cancer    Colon polyps     Diverticulitis

## 2024-08-02 ENCOUNTER — TELEPHONE (OUTPATIENT)
Dept: OBSTETRICS AND GYNECOLOGY | Age: 79
End: 2024-08-02
Payer: MEDICARE

## 2024-08-02 NOTE — TELEPHONE ENCOUNTER
Pt notified of surgery time change on 8-5-24. Pt to arrive at 7:00am for a 9:00am surgery. Pt understood.

## 2024-08-05 ENCOUNTER — ANESTHESIA EVENT (OUTPATIENT)
Dept: PERIOP | Facility: HOSPITAL | Age: 79
End: 2024-08-05
Payer: MEDICARE

## 2024-08-05 ENCOUNTER — APPOINTMENT (OUTPATIENT)
Dept: GENERAL RADIOLOGY | Facility: HOSPITAL | Age: 79
End: 2024-08-05
Payer: MEDICARE

## 2024-08-05 ENCOUNTER — HOSPITAL ENCOUNTER (OUTPATIENT)
Facility: HOSPITAL | Age: 79
Setting detail: HOSPITAL OUTPATIENT SURGERY
Discharge: HOME | End: 2024-08-05
Attending: OBSTETRICS & GYNECOLOGY | Admitting: OBSTETRICS & GYNECOLOGY
Payer: MEDICARE

## 2024-08-05 ENCOUNTER — ANESTHESIA (OUTPATIENT)
Dept: PERIOP | Facility: HOSPITAL | Age: 79
End: 2024-08-05
Payer: MEDICARE

## 2024-08-05 VITALS
RESPIRATION RATE: 16 BRPM | HEIGHT: 63 IN | TEMPERATURE: 97.4 F | WEIGHT: 166.67 LBS | DIASTOLIC BLOOD PRESSURE: 69 MMHG | OXYGEN SATURATION: 97 % | BODY MASS INDEX: 29.53 KG/M2 | SYSTOLIC BLOOD PRESSURE: 128 MMHG | HEART RATE: 54 BPM

## 2024-08-05 DIAGNOSIS — Z09 S/P GYNECOLOGICAL SURGERY, FOLLOW-UP EXAM: Primary | ICD-10-CM

## 2024-08-05 DIAGNOSIS — N32.81 DETRUSOR OVERACTIVITY: ICD-10-CM

## 2024-08-05 PROCEDURE — C1889 IMPLANT/INSERT DEVICE, NOC: HCPCS | Performed by: OBSTETRICS & GYNECOLOGY

## 2024-08-05 PROCEDURE — 72220 X-RAY EXAM SACRUM TAILBONE: CPT

## 2024-08-05 PROCEDURE — C1767 GENERATOR, NEURO NON-RECHARG: HCPCS | Performed by: OBSTETRICS & GYNECOLOGY

## 2024-08-05 PROCEDURE — C1778 LEAD, NEUROSTIMULATOR: HCPCS | Performed by: OBSTETRICS & GYNECOLOGY

## 2024-08-05 PROCEDURE — 25810000003 LACTATED RINGERS PER 1000 ML: Performed by: OBSTETRICS & GYNECOLOGY

## 2024-08-05 PROCEDURE — 25010000002 PROPOFOL 10 MG/ML EMULSION: Performed by: NURSE ANESTHETIST, CERTIFIED REGISTERED

## 2024-08-05 PROCEDURE — S0260 H&P FOR SURGERY: HCPCS | Performed by: OBSTETRICS & GYNECOLOGY

## 2024-08-05 PROCEDURE — 25010000002 FENTANYL CITRATE (PF) 50 MCG/ML SOLUTION: Performed by: ANESTHESIOLOGY

## 2024-08-05 PROCEDURE — 25010000002 SUGAMMADEX 200 MG/2ML SOLUTION: Performed by: NURSE ANESTHETIST, CERTIFIED REGISTERED

## 2024-08-05 PROCEDURE — 64590 INS/RPL PRPH SAC/GSTR NPG/R: CPT | Performed by: OBSTETRICS & GYNECOLOGY

## 2024-08-05 PROCEDURE — 25010000002 CEFAZOLIN PER 500 MG: Performed by: OBSTETRICS & GYNECOLOGY

## 2024-08-05 PROCEDURE — C1787 PATIENT PROGR, NEUROSTIM: HCPCS | Performed by: OBSTETRICS & GYNECOLOGY

## 2024-08-05 PROCEDURE — 76000 FLUOROSCOPY <1 HR PHYS/QHP: CPT | Performed by: OBSTETRICS & GYNECOLOGY

## 2024-08-05 PROCEDURE — 64581 OPN IMPLTJ NEA SACRAL NERVE: CPT | Performed by: OBSTETRICS & GYNECOLOGY

## 2024-08-05 PROCEDURE — 76000 FLUOROSCOPY <1 HR PHYS/QHP: CPT

## 2024-08-05 PROCEDURE — 25010000002 ONDANSETRON PER 1 MG: Performed by: NURSE ANESTHETIST, CERTIFIED REGISTERED

## 2024-08-05 DEVICE — NEUROSTM SACRAL/NRV INTERSTIMX NONRECHG: Type: IMPLANTABLE DEVICE | Site: BACK | Status: FUNCTIONAL

## 2024-08-05 DEVICE — ENV ANTIBAC TYRX NEURO ABS MD: Type: IMPLANTABLE DEVICE | Site: BACK | Status: FUNCTIONAL

## 2024-08-05 DEVICE — KT LD NEUROSTM INTERSTIM SURESCAN FULLBDY 4.32MM: Type: IMPLANTABLE DEVICE | Site: BACK | Status: FUNCTIONAL

## 2024-08-05 RX ORDER — MAGNESIUM HYDROXIDE 1200 MG/15ML
LIQUID ORAL AS NEEDED
Status: DISCONTINUED | OUTPATIENT
Start: 2024-08-05 | End: 2024-08-05 | Stop reason: HOSPADM

## 2024-08-05 RX ORDER — SODIUM CHLORIDE 0.9 % (FLUSH) 0.9 %
10 SYRINGE (ML) INJECTION EVERY 12 HOURS SCHEDULED
Status: DISCONTINUED | OUTPATIENT
Start: 2024-08-05 | End: 2024-08-05 | Stop reason: HOSPADM

## 2024-08-05 RX ORDER — HYDROCODONE BITARTRATE AND ACETAMINOPHEN 5; 325 MG/1; MG/1
1 TABLET ORAL EVERY 4 HOURS PRN
Status: DISCONTINUED | OUTPATIENT
Start: 2024-08-05 | End: 2024-08-05 | Stop reason: HOSPADM

## 2024-08-05 RX ORDER — SODIUM CHLORIDE, SODIUM LACTATE, POTASSIUM CHLORIDE, CALCIUM CHLORIDE 600; 310; 30; 20 MG/100ML; MG/100ML; MG/100ML; MG/100ML
100 INJECTION, SOLUTION INTRAVENOUS CONTINUOUS
Status: DISCONTINUED | OUTPATIENT
Start: 2024-08-05 | End: 2024-08-05 | Stop reason: HOSPADM

## 2024-08-05 RX ORDER — DROPERIDOL 2.5 MG/ML
0.62 INJECTION, SOLUTION INTRAMUSCULAR; INTRAVENOUS ONCE AS NEEDED
Status: DISCONTINUED | OUTPATIENT
Start: 2024-08-05 | End: 2024-08-05 | Stop reason: HOSPADM

## 2024-08-05 RX ORDER — LABETALOL HYDROCHLORIDE 5 MG/ML
5 INJECTION, SOLUTION INTRAVENOUS
Status: DISCONTINUED | OUTPATIENT
Start: 2024-08-05 | End: 2024-08-05 | Stop reason: HOSPADM

## 2024-08-05 RX ORDER — SODIUM CHLORIDE 0.9 % (FLUSH) 0.9 %
3-10 SYRINGE (ML) INJECTION AS NEEDED
Status: DISCONTINUED | OUTPATIENT
Start: 2024-08-05 | End: 2024-08-05 | Stop reason: HOSPADM

## 2024-08-05 RX ORDER — ROCURONIUM BROMIDE 10 MG/ML
INJECTION, SOLUTION INTRAVENOUS AS NEEDED
Status: DISCONTINUED | OUTPATIENT
Start: 2024-08-05 | End: 2024-08-05 | Stop reason: SURG

## 2024-08-05 RX ORDER — SODIUM CHLORIDE 9 MG/ML
40 INJECTION, SOLUTION INTRAVENOUS AS NEEDED
Status: DISCONTINUED | OUTPATIENT
Start: 2024-08-05 | End: 2024-08-05

## 2024-08-05 RX ORDER — SODIUM CHLORIDE 0.9 % (FLUSH) 0.9 %
10 SYRINGE (ML) INJECTION AS NEEDED
Status: DISCONTINUED | OUTPATIENT
Start: 2024-08-05 | End: 2024-08-05 | Stop reason: HOSPADM

## 2024-08-05 RX ORDER — SODIUM CHLORIDE 9 MG/ML
40 INJECTION, SOLUTION INTRAVENOUS AS NEEDED
Status: DISCONTINUED | OUTPATIENT
Start: 2024-08-05 | End: 2024-08-05 | Stop reason: HOSPADM

## 2024-08-05 RX ORDER — DIPHENOXYLATE HYDROCHLORIDE AND ATROPINE SULFATE 2.5; .025 MG/1; MG/1
1 TABLET ORAL DAILY
COMMUNITY

## 2024-08-05 RX ORDER — SODIUM CHLORIDE 0.9 % (FLUSH) 0.9 %
3 SYRINGE (ML) INJECTION EVERY 12 HOURS SCHEDULED
Status: DISCONTINUED | OUTPATIENT
Start: 2024-08-05 | End: 2024-08-05 | Stop reason: HOSPADM

## 2024-08-05 RX ORDER — HYDROCODONE BITARTRATE AND ACETAMINOPHEN 10; 325 MG/1; MG/1
1 TABLET ORAL EVERY 4 HOURS PRN
Status: DISCONTINUED | OUTPATIENT
Start: 2024-08-05 | End: 2024-08-05 | Stop reason: HOSPADM

## 2024-08-05 RX ORDER — PROPOFOL 10 MG/ML
VIAL (ML) INTRAVENOUS AS NEEDED
Status: DISCONTINUED | OUTPATIENT
Start: 2024-08-05 | End: 2024-08-05 | Stop reason: SURG

## 2024-08-05 RX ORDER — LIDOCAINE HYDROCHLORIDE 20 MG/ML
INJECTION, SOLUTION EPIDURAL; INFILTRATION; INTRACAUDAL; PERINEURAL AS NEEDED
Status: DISCONTINUED | OUTPATIENT
Start: 2024-08-05 | End: 2024-08-05 | Stop reason: SURG

## 2024-08-05 RX ORDER — GABAPENTIN 300 MG/1
600 CAPSULE ORAL ONCE
Status: COMPLETED | OUTPATIENT
Start: 2024-08-05 | End: 2024-08-05

## 2024-08-05 RX ORDER — ACETAMINOPHEN 500 MG
1000 TABLET ORAL ONCE
Status: COMPLETED | OUTPATIENT
Start: 2024-08-05 | End: 2024-08-05

## 2024-08-05 RX ORDER — FENTANYL CITRATE 50 UG/ML
50 INJECTION, SOLUTION INTRAMUSCULAR; INTRAVENOUS
Status: DISCONTINUED | OUTPATIENT
Start: 2024-08-05 | End: 2024-08-05 | Stop reason: HOSPADM

## 2024-08-05 RX ORDER — SODIUM CHLORIDE, SODIUM LACTATE, POTASSIUM CHLORIDE, CALCIUM CHLORIDE 600; 310; 30; 20 MG/100ML; MG/100ML; MG/100ML; MG/100ML
1000 INJECTION, SOLUTION INTRAVENOUS CONTINUOUS
Status: DISCONTINUED | OUTPATIENT
Start: 2024-08-05 | End: 2024-08-05 | Stop reason: HOSPADM

## 2024-08-05 RX ORDER — ONDANSETRON 2 MG/ML
4 INJECTION INTRAMUSCULAR; INTRAVENOUS ONCE AS NEEDED
Status: DISCONTINUED | OUTPATIENT
Start: 2024-08-05 | End: 2024-08-05 | Stop reason: HOSPADM

## 2024-08-05 RX ORDER — NALOXONE HCL 0.4 MG/ML
0.4 VIAL (ML) INJECTION AS NEEDED
Status: DISCONTINUED | OUTPATIENT
Start: 2024-08-05 | End: 2024-08-05 | Stop reason: HOSPADM

## 2024-08-05 RX ORDER — ONDANSETRON 2 MG/ML
INJECTION INTRAMUSCULAR; INTRAVENOUS AS NEEDED
Status: DISCONTINUED | OUTPATIENT
Start: 2024-08-05 | End: 2024-08-05 | Stop reason: SURG

## 2024-08-05 RX ORDER — HYDROCODONE BITARTRATE AND ACETAMINOPHEN 5; 325 MG/1; MG/1
1 TABLET ORAL EVERY 6 HOURS PRN
Qty: 8 TABLET | Refills: 0 | Status: SHIPPED | OUTPATIENT
Start: 2024-08-05 | End: 2024-08-07 | Stop reason: SDUPTHER

## 2024-08-05 RX ORDER — FLUMAZENIL 0.1 MG/ML
0.2 INJECTION INTRAVENOUS AS NEEDED
Status: DISCONTINUED | OUTPATIENT
Start: 2024-08-05 | End: 2024-08-05 | Stop reason: HOSPADM

## 2024-08-05 RX ADMIN — SUGAMMADEX 100 MG: 100 INJECTION, SOLUTION INTRAVENOUS at 09:29

## 2024-08-05 RX ADMIN — LIDOCAINE HYDROCHLORIDE 60 MG: 20 INJECTION, SOLUTION EPIDURAL; INFILTRATION; INTRACAUDAL; PERINEURAL at 09:07

## 2024-08-05 RX ADMIN — GABAPENTIN 600 MG: 300 CAPSULE ORAL at 08:03

## 2024-08-05 RX ADMIN — ROCURONIUM BROMIDE 20 MG: 50 INJECTION INTRAVENOUS at 09:07

## 2024-08-05 RX ADMIN — ONDANSETRON 4 MG: 2 INJECTION INTRAMUSCULAR; INTRAVENOUS at 09:39

## 2024-08-05 RX ADMIN — SODIUM CHLORIDE, POTASSIUM CHLORIDE, SODIUM LACTATE AND CALCIUM CHLORIDE 1000 ML: 600; 310; 30; 20 INJECTION, SOLUTION INTRAVENOUS at 08:17

## 2024-08-05 RX ADMIN — PROPOFOL 100 MG: 10 INJECTION, EMULSION INTRAVENOUS at 09:07

## 2024-08-05 RX ADMIN — FENTANYL CITRATE 50 MCG: 50 INJECTION, SOLUTION INTRAMUSCULAR; INTRAVENOUS at 10:06

## 2024-08-05 RX ADMIN — ACETAMINOPHEN 1000 MG: 500 TABLET, FILM COATED ORAL at 08:03

## 2024-08-05 RX ADMIN — CEFAZOLIN 2 G: 2 INJECTION, POWDER, FOR SOLUTION INTRAMUSCULAR; INTRAVENOUS at 09:14

## 2024-08-05 RX ADMIN — HYDROCODONE BITARTRATE AND ACETAMINOPHEN 1 TABLET: 10; 325 TABLET ORAL at 10:05

## 2024-08-05 NOTE — ANESTHESIA PROCEDURE NOTES
Airway  Urgency: elective    Date/Time: 8/5/2024 9:08 AM  Airway not difficult    General Information and Staff    Patient location during procedure: OR  CRNA/CAA: Jamison Mendoza CRNA    Indications and Patient Condition  Indications for airway management: airway protection    Preoxygenated: yes  MILS maintained throughout  Mask difficulty assessment: 1 - vent by mask    Final Airway Details  Final airway type: endotracheal airway      Successful airway: ETT  Cuffed: yes   Successful intubation technique: direct laryngoscopy  Endotracheal tube insertion site: oral  Blade: Robison  Blade size: 2  ETT size (mm): 7.0  Cormack-Lehane Classification: grade I - full view of glottis  Placement verified by: chest auscultation and capnometry   Cuff volume (mL): 5  Measured from: gums  ETT/EBT to gums (cm): 20  Number of attempts at approach: 1  Assessment: lips, teeth, and gum same as pre-op and atraumatic intubation
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
## 2024-08-05 NOTE — OP NOTE
BH Jessica Conner  : 1945  MRN: [id number]  [facility]: [id number]  Date: 2024    Operative Note    INTERSTIM STAGES 1 AND 2 LEAD AND GENERATOR PLACEMENT      Pre-op Diagnosis:  Detrusor overactivity [N32.81]   Post-op Diagnosis:  Post-Op Diagnosis Codes:     * Detrusor overactivity [N32.81]   Procedure: Procedure(s):  INTERSTIM STAGES 1 AND 2 LEAD AND GENERATOR PLACEMENT   Surgeon: Surgeon(s):  Yue Morales MD       Anesthesia: General   Estimated Blood Loss: 5  mls   IVF: 300  mls       ABx: ancef   Specimens:  none   Findings: Excellent toe flexion and leonora.  S3 placement was confirmed with fluoroscopy         Complications: none   Description of Procedure:       The patient was taken into the operating room where general anesthesia was administered on the stretcher.  After intubation, the patient was rolled into a prone position with jelly rolls to protect her chest and breasts, a roll blanket under her hips, and 2 pillows under her feet.  An InterStim lead wire was then placed in the S3 foramen on the patient's left side.  A small stab wound was made to allow advancement of the introducer in the skin at that site.  The patient had good toe flexion with stimulation at each of the 4 leads.  The lead was positioned using live fluoroscopy to make sure that the depth and curve of the lead was optimal.  A scalpel was then used to make a 3 cm incision in the patient's upper right buttock; a pocket was created in the subcutaneous fat layer using blunt dissection.  The lead wire was then tunneled from the puncture site to the battery pocket using the provided tunneling device.  The lead wire was then inserted into the InterStim battery and tightened with the supplied screw .  The power generator was inserted into a dissolvable antibiotic pouch before tucking it into the pocket and the patient's fat layer.  The device was programmed by the [employer] rep who was in the room.  The C-arm was  used to make one final image to document correct placement.  The subcutaneous tissue at the patient's battery pocket was reapproximated using 3-0 Vicryl.  Skin was closed in subcuticular fashion, also using 3-0 vicryl.  One interrupted suture using 3-0 vicryl was placed at the stab wound with the lead wire had initially been inserted.  Skin glue was/were placed over both incisions.  The patient tolerated the procedure well.  Sponge lap and needle counts were correct ×2.        Yue Morales MD   8/5/2024  09:53 CDT

## 2024-08-05 NOTE — ANESTHESIA POSTPROCEDURE EVALUATION
"Patient: Laura Conner    Procedure Summary       Date: 08/05/24 Room / Location:  [facility]  /  [facility]    Anesthesia Start: 0904 Anesthesia Stop: 0957    Procedure: INTERSTIM STAGES 1 AND 2 LEAD AND GENERATOR PLACEMENT Diagnosis:       Detrusor overactivity      (Detrusor overactivity [N32.81])    Surgeons: Yue Morales MD Provider: James Tapia CRNA    Anesthesia Type: general ASA Status: 3            Anesthesia Type: general    Vitals  Vitals Value Taken Time   /61 08/05/24 1055   Temp 97.4 °F (36.3 °C) 08/05/24 1055   Pulse 53 08/05/24 1058   Resp 14 08/05/24 1055   SpO2 97 % 08/05/24 1058   Vitals shown include unfiled device data.        Post Anesthesia Care and Evaluation    Patient location during evaluation: PACU  Patient participation: complete - patient participated  Level of consciousness: awake and awake and alert  Pain score: 0  Pain management: adequate    Airway patency: patent  Anesthetic complications: No anesthetic complications  PONV Status: none  Cardiovascular status: acceptable  Respiratory status: acceptable  Hydration status: acceptable    Comments: Patient discharged according to acceptable Jumana score per RN assessment. See nursing records for further information.     Blood pressure 137/61, pulse 51, temperature 97.4 °F (36.3 °C), temperature source Temporal, resp. rate 14, height 159.5 cm (62.8\"), weight 75.6 kg (166 lb 10.7 oz), SpO2 98%, not currently breastfeeding.      "

## 2024-08-05 NOTE — ANESTHESIA PREPROCEDURE EVALUATION
Anesthesia Evaluation     Patient summary reviewed   no history of anesthetic complications:   NPO Solid Status: > 8 hours             Airway   Mallampati: II  Dental      Pulmonary    (-) COPD, asthma, sleep apnea, not a smoker  Cardiovascular   Exercise tolerance: good (4-7 METS)    (+) hypertension, hyperlipidemia  (-) pacemaker, past MI, angina, cardiac stents      Neuro/Psych  (-) seizures, TIA, CVA  GI/Hepatic/Renal/Endo    (+) renal disease- CRI, thyroid problem hypothyroidism  (-) GERD, liver disease, diabetes    Musculoskeletal     Abdominal    Substance History      OB/GYN          Other      history of cancer (breast) remission                Anesthesia Plan    ASA 3     general     intravenous induction     Anesthetic plan, risks, benefits, and alternatives have been provided, discussed and informed consent has been obtained with: patient.    CODE STATUS:

## 2024-08-07 ENCOUNTER — TELEPHONE (OUTPATIENT)
Dept: OBSTETRICS AND GYNECOLOGY | Age: 79
End: 2024-08-07
Payer: MEDICARE

## 2024-08-07 DIAGNOSIS — Z09 S/P GYNECOLOGICAL SURGERY, FOLLOW-UP EXAM: ICD-10-CM

## 2024-08-07 RX ORDER — HYDROCODONE BITARTRATE AND ACETAMINOPHEN 5; 325 MG/1; MG/1
1 TABLET ORAL EVERY 6 HOURS PRN
Qty: 8 TABLET | Refills: 0 | Status: SHIPPED | OUTPATIENT
Start: 2024-08-07

## 2024-08-07 NOTE — TELEPHONE ENCOUNTER
Caller: Laura Conner    Relationship: Self    Best call back number: [phone]    What is the best time to reach you: ANY    Who are you requesting to speak with (clinical staff, provider,  specific staff member): CLINICAL - ASKING TO SPEAK TO AMAN; PT STATED SHE HAD SX ON MONDAY AND NEEDS TO SPEAK TO DR ROYAL NURSE; I ASKED HER WHAT THE MESSAGE WAS AND SHE SAID SHE IS HAVING AN ISSUE AND WANTS TO SPEAK TO AMAN.    PLEASE CALL PT

## 2024-08-07 NOTE — TELEPHONE ENCOUNTER
Pt stated the pain medicine that was called in for her from her surgery on Monday was not available at her pharmacy, she is asking if there is any other pain medicine that can be called in for her. She stated she is miserable.

## 2024-08-07 NOTE — TELEPHONE ENCOUNTER
"Process Group Note    PATIENT'S NAME: Mariah Gaytan  MRN:   [id number]  :   1988  ACCT. 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  3. Other Diagnoses that is relevant to services:   300.02 (F41.1) Generalized Anxiety Disorder.    [facility]  TRACK: IOP 1    NUMBER OF PARTICIPANTS: 7        Data:    Session content: At the start of this group, patients were invited to check in by identifying themselves, describing their current emotional status, and identifying issues to address in this group.   Area(s) of treatment focus addressed in this session included Symptom Management, Personal Safety, and Community Resources/Discharge Planning.  Pt reports feeling tired after a poor night's sleep. She struggles to go to bed on time, acknowledging that she is a night owl. Pt is proud to have completed her taxes yesterday. She identifies coping skills as: humor and self-compassion. Pt will discharge from the group tomorrow and feels some anxiety, but also feels ready. Denies safety concerns.    Therapeutic Interventions/Treatment Strategies:  Psychotherapist offered support, feedback and validation and reinforced use of skills. Treatment modalities used include Cognitive Behavioral Therapy. Interventions include Behavioral Activation: Explored how behaviors effect mood and interact with thoughts and feelings and Coping Skills: Discussed use of self-soothe skills to decrease distress in the body, Assisted patient in identifying 1-2 healthy distraction skills to reduce overall distress, and Discussed how the use of intentional \"in the moment\" actions can help reduce " Script signed distress.    Assessment:    Patient response:   Patient responded to session by accepting feedback, giving feedback, listening, focusing on goals, being attentive, and accepting support    Possible barriers to participation / learning include: and no barriers identified    Health Issues:   None reported       Substance Use Review:   Substance Use: No active concerns identified.    Mental Status/Behavioral Observations  Appearance:   Appropriate   Eye Contact:   Good   Psychomotor Behavior: Normal   Attitude:   Cooperative   Orientation:   All  Speech   Rate / Production: Normal    Volume:  Normal   Mood:    Anxious   Affect:    Worrisome   Thought Content:   Clear  Thought Form:  Coherent  Logical     Insight:    Good     Plan:   Safety Plan: No current safety concerns identified.  Recommended that patient call 911 or go to the local ED should there be a change in any of these risk factors.   Barriers to treatment: None identified  Patient Contracts (see media tab):  None  Substance Use: Not addressed in session   Continue or Discharge: Patient will continue in Adult Day Treatment (ADT)  as planned. Patient is likely to benefit from learning and using skills as they work toward the goals identified in their treatment plan.      Katina Back, [facility]  March 6, 2024

## 2024-08-07 NOTE — TELEPHONE ENCOUNTER
[facility] stated [facility] [address], called pt to see if she uses a different pharmacy. Pt also uses [facility] [address] Called [facility], they do have the dose she needs. Dr. Morales will send new prescription to [facility]. Called pt to advise of this and to check with [facility] for pickup.  Pt v/u and appreciative.

## 2024-08-08 ENCOUNTER — OFFICE VISIT (OUTPATIENT)
Dept: OBSTETRICS AND GYNECOLOGY | Age: 79
End: 2024-08-08
Payer: MEDICARE

## 2024-08-08 VITALS
DIASTOLIC BLOOD PRESSURE: 84 MMHG | WEIGHT: 165 LBS | HEIGHT: 62 IN | BODY MASS INDEX: 30.36 KG/M2 | SYSTOLIC BLOOD PRESSURE: 170 MMHG

## 2024-08-08 DIAGNOSIS — N39.0 URINARY TRACT INFECTION WITHOUT HEMATURIA, SITE UNSPECIFIED: Primary | ICD-10-CM

## 2024-08-08 NOTE — PROGRESS NOTES
"Subjective   Chief Complaint   Patient presents with    Post-op     Pt here today for 4 day post op interstim stages 1 and 2 lead and gen placement. Pt voices having issues and feels like device isnt working. Pt will leave urine today as well. Pr device is turned on and is on Program 1 at 0.4.      Laura Conner is a 79 y.o. year old  presenting to be seen for her post-operative visit.  She had an Interstim stage I and II 4 days ago.  Patient called yesterday and reported that she was \"miserable\". Patient says she did not sleep several days (was not taking any pain medication), she was uncomfortable because her incision is still sore and also emotionally frustrated that she is still having some incontinence.  Patient says that urinary incontinence was previously \"gushing\" and now that has reduced to a volume she describes as \"leakage\" but she is disappointed that her symptoms have simply improved and not been completely resolved.  Currently she denies any dysuria, but she also reports that she has had many UTI's that did not include urinary symptoms. She feels like leakage was the worst yesterday, when she got up and moved around more.  Patient's device was still on and at 0.4, which is the setting she left the hospital with.    No Additional Complaints Reported    The following portions of the patient's history were reviewed and updated as appropriate:current medications and allergies    Review of Systems      Objective   /84   Ht 157.5 cm (62\")   Wt 74.8 kg (165 lb)   LMP  (LMP Unknown)   BMI 30.18 kg/m²     General:  well developed; well nourished  no acute distress   Abdomen: Not performed.   Pelvis: Not performed.  Incision on R buttock is well-approximated and healing well          Assessment   Pt is 4 days s/p Interstim stage I and II  Patient reassured that settings often have to be \"tweaked\" to get optimal results  Also discussed the possibility that the patient may have a bladder " infection     Plan   InterStim settings changed from 0.4-0.6 in the office today, with my assistance  UA with C&S    No orders of the defined types were placed in this encounter.         This note was electronically signed.    Yue Morales MD  August 8, 2024  Answers submitted by the patient for this visit:  Other (Submitted on 8/7/2024)  Please describe your symptoms.: Need information about the interstim inplant.  Have you had these symptoms before?: No  How long have you been having these symptoms?: 1-4 days  Please list any medications you are currently taking for this condition.: Same as the last time  Primary Reason for Visit (Submitted on 8/7/2024)  What is the primary reason for your visit?: Other

## 2024-08-10 LAB
APPEARANCE UR: CLEAR
BACTERIA UR CULT: NORMAL
BACTERIA UR CULT: NORMAL
BILIRUB UR QL STRIP: NEGATIVE
COLOR UR: YELLOW
GLUCOSE UR QL STRIP: ABNORMAL
HGB UR QL STRIP: NEGATIVE
KETONES UR QL STRIP: NEGATIVE
LEUKOCYTE ESTERASE UR QL STRIP: NEGATIVE
NITRITE UR QL STRIP: NEGATIVE
PH UR STRIP: 8 [PH] (ref 5–8)
PROT UR QL STRIP: NEGATIVE
SP GR UR STRIP: 1.02 (ref 1–1.03)
UROBILINOGEN UR STRIP-MCNC: ABNORMAL MG/DL

## 2024-08-22 ENCOUNTER — OFFICE VISIT (OUTPATIENT)
Dept: OBSTETRICS AND GYNECOLOGY | Age: 79
End: 2024-08-22
Payer: MEDICARE

## 2024-08-22 VITALS
BODY MASS INDEX: 30.18 KG/M2 | SYSTOLIC BLOOD PRESSURE: 132 MMHG | WEIGHT: 164 LBS | HEIGHT: 62 IN | DIASTOLIC BLOOD PRESSURE: 84 MMHG

## 2024-08-22 DIAGNOSIS — N39.0 URINARY TRACT INFECTION WITHOUT HEMATURIA, SITE UNSPECIFIED: Primary | ICD-10-CM

## 2024-08-22 DIAGNOSIS — R32 URINARY INCONTINENCE, UNSPECIFIED TYPE: ICD-10-CM

## 2024-08-22 DIAGNOSIS — Z09 S/P GYNECOLOGICAL SURGERY, FOLLOW-UP EXAM: ICD-10-CM

## 2024-08-22 NOTE — PROGRESS NOTES
"Subjective   Chief Complaint   Patient presents with    Post-op     Pt here today for 2 week post op interstim stages 1&2 lead and gen placement. Pt voices that she is very frustrated and is still having leakage. Pt voices that she soaked through her clothes yesterday.      Laura Conner is a 79 y.o. year old  presenting to be seen for her post-operative visit.  She had an Interstim stage I and II 2 weeks ago.  She denies any pain.  Patient says she is still having urinary leakage - it is not \"gushing\" out like it was prior to surgery, but she is still getting wet.  Patient has been frustrated that her improvement has not been more significant.  The intensity of her program has been changed several times, just minimally (and at one time she \"had three good days\").  Today, Rochelle has spoken to Sari, and on her recommendation the patient's programming is being changed.  She was on Program 1 at 0.8.  She was changed today to Program 2 at 0.3    No Additional Complaints Reported    The following portions of the patient's history were reviewed and updated as appropriate:current medications and allergies    Review of Systems      Objective   /84   Ht 157.5 cm (62\")   Wt 74.4 kg (164 lb)   LMP  (LMP Unknown)   BMI 30.00 kg/m²     General:  well developed; well nourished  no acute distress   Abdomen: Not performed.   Pelvis: Not performed.  Incision on R buttock is almost completely healed          Assessment   Pt is 2 weeks s/p Interstim stage I and II  Patient reassured that settings often have to be \"tweaked\" to get optimal results.  Program has been changed today  Also discussed the possibility that the patient may have a bladder infection  Patient drinking a lot less water than she used to, because she does not want to make more trips to bathroom.  She is drinking mostly tea or a diet coke, but only some water now.  Reminded that caffeine and artificial sweeteners are bladder irritants.     Plan "   RTO in two weeks, on day [employer] rep will be in office.  UA C&S ordered    No orders of the defined types were placed in this encounter.         This note was electronically signed.    Yue Morales MD  August 22, 2024  Answers submitted by the patient for this visit:  Other (Submitted on 8/7/2024)  Please describe your symptoms.: Need information about the interstim inplant.  Have you had these symptoms before?: No  How long have you been having these symptoms?: 1-4 days  Please list any medications you are currently taking for this condition.: Same as the last time  Primary Reason for Visit (Submitted on 8/7/2024)  What is the primary reason for your visit?: Other    Answers submitted by the patient for this visit:  Other (Submitted on 8/15/2024)  Please describe your symptoms.: Post op  Have you had these symptoms before?: No  How long have you been having these symptoms?: 1-2 weeks  Please list any medications you are currently taking for this condition.: None  Please describe any probable cause for these symptoms. : Surgery  Primary Reason for Visit (Submitted on 8/15/2024)  What is the primary reason for your visit?: Other

## 2024-08-24 LAB
APPEARANCE UR: CLEAR
BACTERIA UR CULT: NORMAL
BACTERIA UR CULT: NORMAL
BILIRUB UR QL STRIP: NEGATIVE
COLOR UR: YELLOW
GLUCOSE UR QL STRIP: ABNORMAL
HGB UR QL STRIP: NEGATIVE
KETONES UR QL STRIP: NEGATIVE
LEUKOCYTE ESTERASE UR QL STRIP: NEGATIVE
NITRITE UR QL STRIP: NEGATIVE
PH UR STRIP: 7 [PH] (ref 5–8)
PROT UR QL STRIP: NEGATIVE
SP GR UR STRIP: 1.01 (ref 1–1.03)
UROBILINOGEN UR STRIP-MCNC: ABNORMAL MG/DL

## 2024-09-10 ENCOUNTER — TRANSCRIBE ORDERS (OUTPATIENT)
Dept: ADMINISTRATIVE | Facility: HOSPITAL | Age: 79
End: 2024-09-10

## 2024-09-10 DIAGNOSIS — Z13.820 SCREENING FOR OSTEOPOROSIS: Primary | ICD-10-CM

## 2024-10-03 ENCOUNTER — TELEPHONE (OUTPATIENT)
Dept: OBSTETRICS AND GYNECOLOGY | Age: 79
End: 2024-10-03
Payer: MEDICARE

## 2024-10-03 NOTE — TELEPHONE ENCOUNTER
Patient called stating that her interstim is not working. She is asking for you to give her a call back at your convenience

## 2024-10-04 NOTE — TELEPHONE ENCOUNTER
Called and spoke with pt yesterday and told her I would reach out to interstim rep to see if we need to make adjustments.

## 2024-10-09 NOTE — TELEPHONE ENCOUNTER
Patient calls today requesting to speak to Rochelle in regards to maurilio not working. Patient advised Rochelle is not here today. Spoke with Dr. Morales who informed me our interstim rep is on vacation. Advised patient that as soon as she is back from vacation we would reach out to her. Jack BHATIA

## 2024-10-18 ENCOUNTER — LAB (OUTPATIENT)
Dept: LAB | Facility: HOSPITAL | Age: 79
End: 2024-10-18
Payer: MEDICARE

## 2024-10-18 ENCOUNTER — TRANSCRIBE ORDERS (OUTPATIENT)
Dept: ADMINISTRATIVE | Facility: HOSPITAL | Age: 79
End: 2024-10-18
Payer: MEDICARE

## 2024-10-18 DIAGNOSIS — N18.31 CHRONIC KIDNEY DISEASE, STAGE 3A: ICD-10-CM

## 2024-10-18 DIAGNOSIS — M13.0 POLYARTHRITIS, UNSPECIFIED: ICD-10-CM

## 2024-10-18 DIAGNOSIS — N18.31 CHRONIC KIDNEY DISEASE, STAGE 3A: Primary | ICD-10-CM

## 2024-10-18 LAB
ALBUMIN SERPL-MCNC: 4.3 G/DL (ref 3.5–5.2)
ALBUMIN/GLOB SERPL: 1.2 G/DL
ALP SERPL-CCNC: 75 U/L (ref 39–117)
ALT SERPL W P-5'-P-CCNC: 9 U/L (ref 1–33)
ANION GAP SERPL CALCULATED.3IONS-SCNC: 14 MMOL/L (ref 5–15)
AST SERPL-CCNC: 15 U/L (ref 1–32)
AUTO MIXED CELLS #: 0.7 10*3/MM3 (ref 0.1–2.6)
AUTO MIXED CELLS %: 6.3 % (ref 0.1–24)
BILIRUB SERPL-MCNC: 0.4 MG/DL (ref 0–1.2)
BUN SERPL-MCNC: 29 MG/DL (ref 8–23)
BUN/CREAT SERPL: 22.3 (ref 7–25)
CALCIUM SPEC-SCNC: 9.2 MG/DL (ref 8.6–10.5)
CHLORIDE SERPL-SCNC: 94 MMOL/L (ref 98–107)
CO2 SERPL-SCNC: 25 MMOL/L (ref 22–29)
CREAT SERPL-MCNC: 1.3 MG/DL (ref 0.57–1)
EGFRCR SERPLBLD CKD-EPI 2021: 41.7 ML/MIN/1.73
ERYTHROCYTE [DISTWIDTH] IN BLOOD BY AUTOMATED COUNT: 13.6 % (ref 12.3–15.4)
ERYTHROCYTE [SEDIMENTATION RATE] IN BLOOD: 84 MM/HR (ref 0–30)
GLOBULIN UR ELPH-MCNC: 3.5 GM/DL
GLUCOSE SERPL-MCNC: 194 MG/DL (ref 65–99)
HCT VFR BLD AUTO: 40.8 % (ref 34–46.6)
HGB BLD-MCNC: 13.4 G/DL (ref 12–15.9)
LYMPHOCYTES # BLD AUTO: 2.5 10*3/MM3 (ref 0.7–3.1)
LYMPHOCYTES NFR BLD AUTO: 21 % (ref 19.6–45.3)
MCH RBC QN AUTO: 28.8 PG (ref 26.6–33)
MCHC RBC AUTO-ENTMCNC: 32.8 G/DL (ref 31.5–35.7)
MCV RBC AUTO: 87.6 FL (ref 79–97)
NEUTROPHILS NFR BLD AUTO: 72.7 % (ref 42.7–76)
NEUTROPHILS NFR BLD AUTO: 8.6 10*3/MM3 (ref 1.7–7)
PLATELET # BLD AUTO: 387 10*3/MM3 (ref 140–450)
PMV BLD AUTO: 9.3 FL (ref 6–12)
POTASSIUM SERPL-SCNC: 3.5 MMOL/L (ref 3.5–5.2)
PROT SERPL-MCNC: 7.8 G/DL (ref 6–8.5)
RBC # BLD AUTO: 4.66 10*6/MM3 (ref 3.77–5.28)
SODIUM SERPL-SCNC: 133 MMOL/L (ref 136–145)
WBC NRBC COR # BLD AUTO: 11.8 10*3/MM3 (ref 3.4–10.8)

## 2024-10-18 PROCEDURE — 85652 RBC SED RATE AUTOMATED: CPT

## 2024-10-18 PROCEDURE — 36415 COLL VENOUS BLD VENIPUNCTURE: CPT

## 2024-10-18 PROCEDURE — 84550 ASSAY OF BLOOD/URIC ACID: CPT

## 2024-10-18 PROCEDURE — 86140 C-REACTIVE PROTEIN: CPT

## 2024-10-18 PROCEDURE — 85025 COMPLETE CBC W/AUTO DIFF WBC: CPT

## 2024-10-18 PROCEDURE — 80053 COMPREHEN METABOLIC PANEL: CPT

## 2024-10-19 ENCOUNTER — HOSPITAL ENCOUNTER (EMERGENCY)
Age: 79
Discharge: HOME | End: 2024-10-19
Attending: EMERGENCY MEDICINE
Payer: MEDICARE

## 2024-10-19 VITALS
WEIGHT: 158.73 LBS | OXYGEN SATURATION: 96 % | SYSTOLIC BLOOD PRESSURE: 121 MMHG | BODY MASS INDEX: 28.12 KG/M2 | HEART RATE: 74 BPM | RESPIRATION RATE: 16 BRPM | TEMPERATURE: 98.2 F | DIASTOLIC BLOOD PRESSURE: 68 MMHG

## 2024-10-19 DIAGNOSIS — R10.84 GENERALIZED ABDOMINAL PAIN: Primary | ICD-10-CM

## 2024-10-19 DIAGNOSIS — R74.8 ELEVATED LIPASE: ICD-10-CM

## 2024-10-19 LAB
ALBUMIN SERPL-MCNC: 4 G/DL (ref 3.5–5.2)
ALP SERPL-CCNC: 73 U/L (ref 35–104)
ALT SERPL-CCNC: 7 U/L (ref 5–33)
ANION GAP SERPL CALCULATED.3IONS-SCNC: 14 MMOL/L (ref 7–19)
AST SERPL-CCNC: 17 U/L (ref 5–32)
BASOPHILS # BLD: 0.1 K/UL (ref 0–0.2)
BASOPHILS NFR BLD: 0.4 % (ref 0–1)
BILIRUB SERPL-MCNC: 0.3 MG/DL (ref 0.2–1.2)
BUN SERPL-MCNC: 35 MG/DL (ref 8–23)
CALCIUM SERPL-MCNC: 9.5 MG/DL (ref 8.8–10.2)
CHLORIDE SERPL-SCNC: 92 MMOL/L (ref 98–111)
CO2 SERPL-SCNC: 26 MMOL/L (ref 22–29)
CREAT SERPL-MCNC: 0.9 MG/DL (ref 0.5–0.9)
CRP SERPL-MCNC: 19.29 MG/DL (ref 0–0.5)
EOSINOPHIL # BLD: 0.1 K/UL (ref 0–0.6)
EOSINOPHIL NFR BLD: 0.8 % (ref 0–5)
ERYTHROCYTE [DISTWIDTH] IN BLOOD BY AUTOMATED COUNT: 13.4 % (ref 11.5–14.5)
GLUCOSE SERPL-MCNC: 141 MG/DL (ref 70–99)
HCT VFR BLD AUTO: 39.2 % (ref 37–47)
HGB BLD-MCNC: 13.2 G/DL (ref 12–16)
IMM GRANULOCYTES # BLD: 0.2 K/UL
LIPASE SERPL-CCNC: 129 U/L (ref 13–60)
LYMPHOCYTES # BLD: 2.3 K/UL (ref 1.1–4.5)
LYMPHOCYTES NFR BLD: 16.9 % (ref 20–40)
MCH RBC QN AUTO: 28.9 PG (ref 27–31)
MCHC RBC AUTO-ENTMCNC: 33.7 G/DL (ref 33–37)
MCV RBC AUTO: 86 FL (ref 81–99)
MONOCYTES # BLD: 1.1 K/UL (ref 0–0.9)
MONOCYTES NFR BLD: 8.4 % (ref 0–10)
NEUTROPHILS # BLD: 9.6 K/UL (ref 1.5–7.5)
NEUTS SEG NFR BLD: 71.8 % (ref 50–65)
PLATELET # BLD AUTO: 403 K/UL (ref 130–400)
PMV BLD AUTO: 9.8 FL (ref 9.4–12.3)
POTASSIUM SERPL-SCNC: 4 MMOL/L (ref 3.5–5)
PROT SERPL-MCNC: 7.5 G/DL (ref 6.4–8.3)
RBC # BLD AUTO: 4.56 M/UL (ref 4.2–5.4)
SODIUM SERPL-SCNC: 132 MMOL/L (ref 136–145)
URATE SERPL-MCNC: 6.7 MG/DL (ref 2.4–5.7)
WBC # BLD AUTO: 13.3 K/UL (ref 4.8–10.8)

## 2024-10-19 PROCEDURE — 83690 ASSAY OF LIPASE: CPT

## 2024-10-19 PROCEDURE — 85025 COMPLETE CBC W/AUTO DIFF WBC: CPT

## 2024-10-19 PROCEDURE — 6360000002 HC RX W HCPCS: Performed by: EMERGENCY MEDICINE

## 2024-10-19 PROCEDURE — 2580000003 HC RX 258: Performed by: EMERGENCY MEDICINE

## 2024-10-19 PROCEDURE — 96374 THER/PROPH/DIAG INJ IV PUSH: CPT

## 2024-10-19 PROCEDURE — 99284 EMERGENCY DEPT VISIT MOD MDM: CPT

## 2024-10-19 PROCEDURE — 36415 COLL VENOUS BLD VENIPUNCTURE: CPT

## 2024-10-19 PROCEDURE — 80053 COMPREHEN METABOLIC PANEL: CPT

## 2024-10-19 RX ORDER — HYDROMORPHONE HYDROCHLORIDE 1 MG/ML
0.5 INJECTION, SOLUTION INTRAMUSCULAR; INTRAVENOUS; SUBCUTANEOUS ONCE
Status: COMPLETED | OUTPATIENT
Start: 2024-10-19 | End: 2024-10-19

## 2024-10-19 RX ORDER — SODIUM CHLORIDE, SODIUM LACTATE, POTASSIUM CHLORIDE, AND CALCIUM CHLORIDE .6; .31; .03; .02 G/100ML; G/100ML; G/100ML; G/100ML
500 INJECTION, SOLUTION INTRAVENOUS ONCE
Status: COMPLETED | OUTPATIENT
Start: 2024-10-19 | End: 2024-10-19

## 2024-10-19 RX ADMIN — SODIUM CHLORIDE, POTASSIUM CHLORIDE, SODIUM LACTATE AND CALCIUM CHLORIDE 500 ML: 600; 310; 30; 20 INJECTION, SOLUTION INTRAVENOUS at 16:27

## 2024-10-19 RX ADMIN — HYDROMORPHONE HYDROCHLORIDE 0.5 MG: 1 INJECTION, SOLUTION INTRAMUSCULAR; INTRAVENOUS; SUBCUTANEOUS at 16:26

## 2024-10-19 ASSESSMENT — ENCOUNTER SYMPTOMS
DIARRHEA: 0
NAUSEA: 0
ABDOMINAL PAIN: 1
SHORTNESS OF BREATH: 0
COUGH: 0
VOMITING: 0

## 2024-10-19 ASSESSMENT — PAIN SCALES - GENERAL: PAINLEVEL_OUTOF10: 8

## 2024-10-19 NOTE — ED PROVIDER NOTES
[facility] EMERGENCY DEPT  eMERGENCY dEPARTMENT eNCOUnter      Pt Name: Bushra Caballero  MRN: [id number]  Birthdate 1945  Date of evaluation: 10/19/2024  Provider: Gui Burgos MD    CHIEF COMPLAINT       Chief Complaint   Patient presents with    Flank Pain     Bilateral flank pain, has CKD    Extremity Weakness     Difficulty using right hand since Thursday; given steroid shot and pain medication yesterday          HISTORY OF PRESENT ILLNESS   (Location/Symptom, Timing/Onset,Context/Setting, Quality, Duration, Modifying Factors, Severity)  Note limiting factors.   Bushra Caballero is a 79 y.o. female who presents to the emergency department for evaluation regarding concerns over dehydration as she has a prior history of chronic kidney disease.  Patient reports she has been undergoing outpatient treatment for diverticulitis with a course of Flagyl that she just recently finished.  She is continue to have some intermittent episodes of abdominal pain although has not had any specific pain today.  She did receive a steroid injection and a dose of pain medication previously this week as management for her arthritis.  She has not had fevers or chills or vomiting.    HPI    NursingNotes were reviewed.    REVIEW OF SYSTEMS    (2-9 systems for level 4, 10 or more for level 5)     Review of Systems   Constitutional:  Negative for chills and fever.   Respiratory:  Negative for cough and shortness of breath.    Cardiovascular:  Negative for chest pain.   Gastrointestinal:  Positive for abdominal pain. Negative for diarrhea, nausea and vomiting.   Genitourinary:  Negative for dysuria and hematuria.   Musculoskeletal:  Positive for arthralgias.   Neurological:  Positive for numbness.   All other systems reviewed and are negative.           PAST MEDICALHISTORY     Past Medical History:   Diagnosis Date    Acute renal failure (HCC) 07/29/2016    discharged 8/1/16; \"due to strong antibiotic and being dehydrated\"    Arthritis     Breast

## 2024-11-04 NOTE — PROGRESS NOTES
Subjective:     Patient ID: Bushra Caballero is a 79 y.o. female  PCP: Dayday Huang MD  Referring Provider: No ref. provider found    HPI  Patient presents to the office today with the following complaints: Endoscopy      Pt seen today for EGD recall.  Hx chronic GERD, Marie's esophagus.  Reflux, heartburn controlled.  Some issues swallowing that began about 2 months ago.  Trouble swallowing foods and some medications.          Pt reports recent diverticulitis flare.  Symptoms resolved.  Treated with Flagyl per PCP.  Reviewed last Colonoscopy report with pt.     Last EGD 11/29/2021 - Gastritis, no Marie's, no h pylori, 3 yr recall   Last Colonoscopy 8/1/2024 - Diverticular disease, prn (age)   Family hx colon cancer/colon polyps - Paternal Aunt       Assessment:     1. Chronic GERD    2. Marie's esophagus without dysplasia    3. Dysphagia, unspecified type    4. Diverticulosis              Plan:   - Schedule EGD  Nothing to eat or drink after midnight.  No driving for 24 hours after procedure. Bring a  to procedure.  No aspirin, NSAIDs, fish oil 5 days before procedure.  I have discussed the benefits, alternatives, and risks (including bleeding, perforation and death)  for pursuing Endoscopy (EGD/Colonscopy/EUS/ERCP) with the patient and they are willing to continue. We also discussed the need for anesthesia, IV access, proper dietary changes, medication changes if necessary, and need for bowel prep (if ordered) prior to their Endoscopic procedure.  They are aware they must have someone accompany them to their scheduled procedure to drive them home - they agree to the above and are willing to continue.       Orders  No orders of the defined types were placed in this encounter.    Medications  No orders of the defined types were placed in this encounter.        Patient History:     Past Medical History:   Diagnosis Date    Acute renal failure (HCC) 07/29/2016    discharged 8/1/16; \"due to strong

## 2024-11-05 ENCOUNTER — OFFICE VISIT (OUTPATIENT)
Dept: GASTROENTEROLOGY | Age: 79
End: 2024-11-05
Payer: MEDICARE

## 2024-11-05 VITALS
WEIGHT: 166 LBS | BODY MASS INDEX: 28.34 KG/M2 | HEART RATE: 51 BPM | HEIGHT: 64 IN | DIASTOLIC BLOOD PRESSURE: 80 MMHG | OXYGEN SATURATION: 96 % | SYSTOLIC BLOOD PRESSURE: 130 MMHG

## 2024-11-05 DIAGNOSIS — K22.70 BARRETT'S ESOPHAGUS WITHOUT DYSPLASIA: ICD-10-CM

## 2024-11-05 DIAGNOSIS — K21.9 CHRONIC GERD: Primary | ICD-10-CM

## 2024-11-05 DIAGNOSIS — R13.10 DYSPHAGIA, UNSPECIFIED TYPE: ICD-10-CM

## 2024-11-05 DIAGNOSIS — K57.90 DIVERTICULOSIS: ICD-10-CM

## 2024-11-05 PROCEDURE — 3079F DIAST BP 80-89 MM HG: CPT | Performed by: NURSE PRACTITIONER

## 2024-11-05 PROCEDURE — 1090F PRES/ABSN URINE INCON ASSESS: CPT | Performed by: NURSE PRACTITIONER

## 2024-11-05 PROCEDURE — 3075F SYST BP GE 130 - 139MM HG: CPT | Performed by: NURSE PRACTITIONER

## 2024-11-05 PROCEDURE — 99214 OFFICE O/P EST MOD 30 MIN: CPT | Performed by: NURSE PRACTITIONER

## 2024-11-05 PROCEDURE — G8427 DOCREV CUR MEDS BY ELIG CLIN: HCPCS | Performed by: NURSE PRACTITIONER

## 2024-11-05 PROCEDURE — G8419 CALC BMI OUT NRM PARAM NOF/U: HCPCS | Performed by: NURSE PRACTITIONER

## 2024-11-05 PROCEDURE — 1036F TOBACCO NON-USER: CPT | Performed by: NURSE PRACTITIONER

## 2024-11-05 PROCEDURE — G8484 FLU IMMUNIZE NO ADMIN: HCPCS | Performed by: NURSE PRACTITIONER

## 2024-11-05 PROCEDURE — 1123F ACP DISCUSS/DSCN MKR DOCD: CPT | Performed by: NURSE PRACTITIONER

## 2024-11-05 PROCEDURE — G8399 PT W/DXA RESULTS DOCUMENT: HCPCS | Performed by: NURSE PRACTITIONER

## 2024-11-05 PROCEDURE — 1159F MED LIST DOCD IN RCRD: CPT | Performed by: NURSE PRACTITIONER

## 2024-11-05 RX ORDER — TURMERIC ROOT EXTRACT 500 MG
2 TABLET ORAL DAILY
COMMUNITY

## 2024-11-05 RX ORDER — LEVOTHYROXINE SODIUM 88 UG/1
88 TABLET ORAL DAILY
COMMUNITY

## 2024-11-05 RX ORDER — CALCIUM CARBONATE 500(1250)
500 TABLET ORAL DAILY
COMMUNITY

## 2024-11-05 ASSESSMENT — ENCOUNTER SYMPTOMS
TROUBLE SWALLOWING: 1
SHORTNESS OF BREATH: 0
VOMITING: 0
ABDOMINAL PAIN: 0
CONSTIPATION: 0
ABDOMINAL DISTENTION: 0
CHOKING: 0
DIARRHEA: 0
NAUSEA: 0
RECTAL PAIN: 0
BLOOD IN STOOL: 0
ANAL BLEEDING: 0
COUGH: 0

## 2024-11-14 ENCOUNTER — ANESTHESIA (OUTPATIENT)
Dept: OPERATING ROOM | Age: 79
End: 2024-11-14

## 2024-11-14 ENCOUNTER — HOSPITAL ENCOUNTER (OUTPATIENT)
Age: 79
Setting detail: OUTPATIENT SURGERY
Discharge: HOME | End: 2024-11-14
Attending: INTERNAL MEDICINE | Admitting: INTERNAL MEDICINE

## 2024-11-14 ENCOUNTER — HOSPITAL ENCOUNTER (OUTPATIENT)
Age: 79
Setting detail: SPECIMEN
Discharge: HOME | End: 2024-11-14
Payer: MEDICARE

## 2024-11-14 ENCOUNTER — APPOINTMENT (OUTPATIENT)
Dept: OPERATING ROOM | Age: 79
End: 2024-11-14
Attending: INTERNAL MEDICINE

## 2024-11-14 ENCOUNTER — ANESTHESIA EVENT (OUTPATIENT)
Dept: OPERATING ROOM | Age: 79
End: 2024-11-14

## 2024-11-14 VITALS
WEIGHT: 162 LBS | HEART RATE: 66 BPM | OXYGEN SATURATION: 97 % | HEIGHT: 64 IN | RESPIRATION RATE: 18 BRPM | BODY MASS INDEX: 27.66 KG/M2 | DIASTOLIC BLOOD PRESSURE: 68 MMHG | TEMPERATURE: 97.8 F | SYSTOLIC BLOOD PRESSURE: 131 MMHG

## 2024-11-14 PROCEDURE — G8907 PT DOC NO EVENTS ON DISCHARG: HCPCS

## 2024-11-14 PROCEDURE — 88305 TISSUE EXAM BY PATHOLOGIST: CPT

## 2024-11-14 PROCEDURE — 43248 EGD GUIDE WIRE INSERTION: CPT

## 2024-11-14 PROCEDURE — G8918 PT W/O PREOP ORDER IV AB PRO: HCPCS

## 2024-11-14 PROCEDURE — 43239 EGD BIOPSY SINGLE/MULTIPLE: CPT

## 2024-11-14 RX ORDER — SODIUM CHLORIDE 9 MG/ML
INJECTION, SOLUTION INTRAVENOUS CONTINUOUS
Status: DISCONTINUED | OUTPATIENT
Start: 2024-11-14 | End: 2024-11-14 | Stop reason: HOSPADM

## 2024-11-14 RX ORDER — PROPOFOL 10 MG/ML
INJECTION, EMULSION INTRAVENOUS
Status: DISCONTINUED | OUTPATIENT
Start: 2024-11-14 | End: 2024-11-14 | Stop reason: SDUPTHER

## 2024-11-14 RX ORDER — OMEPRAZOLE 40 MG/1
40 CAPSULE, DELAYED RELEASE ORAL
Qty: 30 CAPSULE | Refills: 3 | Status: SHIPPED | OUTPATIENT
Start: 2024-11-14

## 2024-11-14 RX ORDER — LIDOCAINE HYDROCHLORIDE 10 MG/ML
INJECTION, SOLUTION EPIDURAL; INFILTRATION; INTRACAUDAL; PERINEURAL
Status: DISCONTINUED | OUTPATIENT
Start: 2024-11-14 | End: 2024-11-14 | Stop reason: SDUPTHER

## 2024-11-14 RX ORDER — SODIUM CHLORIDE, SODIUM LACTATE, POTASSIUM CHLORIDE, CALCIUM CHLORIDE 600; 310; 30; 20 MG/100ML; MG/100ML; MG/100ML; MG/100ML
INJECTION, SOLUTION INTRAVENOUS CONTINUOUS
Status: DISCONTINUED | OUTPATIENT
Start: 2024-11-14 | End: 2024-11-14 | Stop reason: HOSPADM

## 2024-11-14 RX ADMIN — SODIUM CHLORIDE, SODIUM LACTATE, POTASSIUM CHLORIDE, CALCIUM CHLORIDE: 600; 310; 30; 20 INJECTION, SOLUTION INTRAVENOUS at 10:04

## 2024-11-14 RX ADMIN — PROPOFOL 60 MG: 10 INJECTION, EMULSION INTRAVENOUS at 10:21

## 2024-11-14 RX ADMIN — PROPOFOL 20 MG: 10 INJECTION, EMULSION INTRAVENOUS at 10:18

## 2024-11-14 RX ADMIN — PROPOFOL 50 MG: 10 INJECTION, EMULSION INTRAVENOUS at 10:15

## 2024-11-14 RX ADMIN — LIDOCAINE HYDROCHLORIDE 50 MG: 10 INJECTION, SOLUTION EPIDURAL; INFILTRATION; INTRACAUDAL; PERINEURAL at 10:15

## 2024-11-14 ASSESSMENT — LIFESTYLE VARIABLES: SMOKING_STATUS: 0

## 2024-11-14 ASSESSMENT — PAIN - FUNCTIONAL ASSESSMENT: PAIN_FUNCTIONAL_ASSESSMENT: NONE - DENIES PAIN

## 2024-11-14 ASSESSMENT — ENCOUNTER SYMPTOMS: SHORTNESS OF BREATH: 0

## 2024-11-14 NOTE — H&P
Patient Name: Bushra Caballero  : 1945  MRN: [id number]  DATE: 24    Allergies:   Allergies   Allergen Reactions    Ciprofloxacin Other (See Comments)     Kidney failure    Morphine      Very Restless        ENDOSCOPY  History and Physical    Procedure:    [] Diagnostic Colonoscopy       [] Screening Colonoscopy  [x] EGD      [] ERCP      [] EUS       [] Other    [x] Previous office notes/History and Physical reviewed from the patients chart. Please see EMR for further details of HPI. I have examined the patient's status immediately prior to the procedure and:      Indications/HPI:    []Abdominal Pain   [x]Barretts  []Screening/Surveillance   []History of Polyps  [x]Dysphagia            [] +Cologard/DNA testing  []Abnormal Imaging              []EOE Hx              [] Family Hx of CRC/Polyps  []Anemia                            []Food Impaction       []Recent Poor Prep  []GI Bleed             []Lymphadenopathy  []History of Polyps  []Change in bowel habits []Heartburn/Reflux  []Cancer- GI/Lung  []Chest Pain - Non Cardiac []Heme (+) Stool []Ulcers  []Constipation  []Hemoptysis  []Incontinence    []Diarrhea  []Hypoxemia  []Rectal Bleed (BRBPR)  []Nausea/Vomiting   [] Varices  []Crohns/Colitis  []Pancreatic Cyst   [] Cirrhosis   []Pancreatitis    []Abnormal MRCP  []Elevated LFT [] Stent Removal, Previous ERCP  []Other:     Anesthesia:   [x] MAC [] Moderate Sedation   [] General   [] None     ROS: 12 pt Review of Symptoms was negative unless mentioned above    Medications:   Prior to Admission medications    Medication Sig Start Date End Date Taking? Authorizing Provider   levothyroxine (SYNTHROID) 88 MCG tablet Take 1 tablet by mouth Daily $$$$$ BRAND NAME ONLY $$$$$$$$    Kareen Santacruz MD   Vitamin D3 125 MCG (5000 UT) TABS tablet Take 1 tablet by mouth daily    Kareen Santacruz MD   calcium carbonate (OSCAL) 500 MG TABS tablet Take 1 tablet by mouth daily    Kareen Santacruz MD    Turmeric 500 MG TABS Take 2 tablets by mouth daily    Kareen Santacruz MD   FARXIGA 10 MG tablet TAKE 1 TABLET BY MOUTH EVERY DAY 1/30/23   Kareen Santacruz MD   allopurinol (ZYLOPRIM) 100 MG tablet Take 2 tablets by mouth daily 8/29/22   Kareen Santacruz MD   olmesartan-hydroCHLOROthiazide (BENICAR HCT) 40-12.5 MG per tablet Take 1 tablet by mouth daily    Kareen Santacruz MD   simvastatin (ZOCOR) 20 MG tablet Take 1 tablet by mouth nightly    ProviderKareen MD       Past Medical History:  Past Medical History:   Diagnosis Date    Acute renal failure (HCC) 07/29/2016    discharged 8/1/16; \"due to strong antibiotic and being dehydrated\"    Arthritis     Breast cancer (HCC)     surgery only    Cancer (HCC)     Breast cancer    Colon polyps     Diverticulitis     GERD (gastroesophageal reflux disease)     Hyperlipidemia     Hypertension     Joint pain     Thyroid condition        Past Surgical History:  Past Surgical History:   Procedure Laterality Date    ABDOMEN SURGERY      BLADDER REPAIR      BLEPHAROPLASTY      BREAST ENHANCEMENT SURGERY      BUNIONECTOMY      CATARACT REMOVAL Bilateral     COLONOSCOPY  03/10/2009    Dr Montero-Diverticulosis, HP, lipoma in the proximal ascending colon, 5 yr recall    COLONOSCOPY  04/29/2014    Dr Montero-TA x 2, HP, 5 yr recall    COLONOSCOPY N/A 05/21/2019    Dr CUONG Church-Questionable lipoma, diverticular disease-Tubular AP (-) dysplasia, 5 yr recall    COLONOSCOPY N/A 08/01/2024    Dr CUONG Church-Diverticular disease, prn (age)    HYSTERECTOMY (CERVIX STATUS UNKNOWN)      KNEE SURGERY Left 08/17/2016    MASTECTOMY Bilateral 1984    OVARY REMOVAL      PARTIAL HYSTERECTOMY (CERVIX NOT REMOVED)      PATELLA SURGERY      SPINE SURGERY Bilateral 10/17/2022    L1 kyphoplasty performed by Jonathon Shaw MD at [facility] OR    THYROIDECTOMY, PARTIAL      TONSILLECTOMY AND ADENOIDECTOMY      TOTAL KNEE ARTHROPLASTY Left 08/17/2016    KNEE TOTAL ARTHROPLASTY

## 2024-11-14 NOTE — ANESTHESIA PRE PROCEDURE
Department of Anesthesiology  Preprocedure Note       Name:  Bushra Caballero   Age:  79 y.o.  :  1945                                          MRN:  [id number]         Date:  2024      Surgeon: Surgeon(s):  Flavio Church MD    Procedure: Procedure(s):  ESOPHAGOGASTRODUODENOSCOPY BIOPSY    Medications prior to admission:   Prior to Admission medications    Medication Sig Start Date End Date Taking? Authorizing Provider   levothyroxine (SYNTHROID) 88 MCG tablet Take 1 tablet by mouth Daily $$$$$ BRAND NAME ONLY $$$$$$$$   Yes Kareen Santacruz MD   Vitamin D3 125 MCG (5000 UT) TABS tablet Take 1 tablet by mouth daily   Yes Kareen Santacruz MD   calcium carbonate (OSCAL) 500 MG TABS tablet Take 1 tablet by mouth daily   Yes Kareen Santacruz MD   Turmeric 500 MG TABS Take 2 tablets by mouth daily   Yes Kareen Santacruz MD   FARXIGA 10 MG tablet TAKE 1 TABLET BY MOUTH EVERY DAY 23  Yes Kareen Santacruz MD   allopurinol (ZYLOPRIM) 100 MG tablet Take 2 tablets by mouth daily 22  Yes Kareen Santacruz MD   olmesartan-hydroCHLOROthiazide (BENICAR HCT) 40-12.5 MG per tablet Take 1 tablet by mouth daily   Yes ProviderKareen MD   simvastatin (ZOCOR) 20 MG tablet Take 1 tablet by mouth nightly   Yes ProviderKareen MD       Current medications:    Current Facility-Administered Medications   Medication Dose Route Frequency Provider Last Rate Last Admin    0.9 % sodium chloride infusion   IntraVENous Continuous Flavio Church MD        lactated ringers infusion   IntraVENous Continuous Helene King, APRN - CRNA           Allergies:    Allergies   Allergen Reactions    Ciprofloxacin Other (See Comments)     Kidney failure       Problem List:    Patient Active Problem List   Diagnosis Code    Irritable bowel syndrome with diarrhea K58.0    Diverticulosis K57.90    History of adenomatous polyp of colon Z86.0101    Primary osteoarthritis of left knee M17.12

## 2024-11-14 NOTE — OP NOTE
Endoscopic Procedure Note    Patient: Bushra Caballero : 1945  [facility] Rec#: [id number] Acc#: [id number]     Primary Care Provider Dayday Huang MD  Referring Provider: JEANNA Haney    Endoscopist: Flavio Church MD    Date of Procedure:  2024    Procedure:   1. EGD with biopsy  2. EGD with wire-guided dilation- 54 F    Indications:   1. Dysphagia   2. Hx of Marie's esophagus    Anesthesia:  Sedation was administered by anesthesia who monitored the patient during the procedure.    Estimated Blood Loss: minimal    Procedure:   After reviewing the patient's chart and obtaining informed consent, the patient was placed in the left lateral decubitus position.  A forward-viewing Olympus endoscope was lubricated and inserted through the mouth into the oropharynx. Under direct visualization, the upper esophagus was intubated. The scope was advanced to the level of the third portion of duodenum. Scope was slowly withdrawn with careful inspection of the mucosal surfaces. The scope was retroflexed for inspection of the gastric fundus and incisura. Findings and maneuvers are listed in impression below. The patient tolerated the procedure well. The scope was removed. There were no immediate complications.    Findings:   Esophagus: abnormal: there are questionable changes of esophagitis vs Marie's- biopsied.    There is a questionable benign appearing stricture in the distal esophagus, dilated due to symptoms.  A guidewire was placed through the endoscope and the endoscope was removed.  A 54 British savory dilator was advanced down the length of the esophagus using a fixed-point wire technique. The dilator and guidewire were removed.  The patient tolerated the procedure well.     There is a small hiatal hernia present.      Stomach:  normal      Duodenum: normal      IMPRESSION:  1. Esophagitis vs Marie's- biopsied   2. S/p esophageal dilation     RECOMMENDATIONS:    1.  Await path results  2.  I

## 2024-11-14 NOTE — DISCHARGE INSTRUCTIONS
RECOMMENDATIONS:    1.  Await path results  2.  I would recommend a PPI (Prilosec) daily, defer to her nephrologist for PPI Management. (Rx sent)  3.  Minimize/avoid NSAIDs  4.  Repeat dilation as needed  5.  Follow up OV with JEANNA Brown in 3 months.     POST-OP ORDERS: ENDOSCOPY & COLONOSCOPY:    1. Rest today.    2. DO NOT eat or drink until wide awake; eat your usual diet today in moderate amount only.    3. DO NOT drive today.    4. Call physician if you have severe pain, vomiting, fever, rectal bleeding or black bowel movements.    5.  If a biopsy was taken or a polyp removed, you should expect to hear results in about 21 days.  If you have heard nothing from your physician by then, call the office for results.    6.  Discharge home when patient awake, vitals signs stable and tolerating liquids.    7. Call with questions or concerns [phone].    NSAIDS (Non-steroidal Anti-Inflammatory)    You have been directed by your physician to avoid any NSAID's; the following medications are a list of those to avoid. If you think that you are taking any NSAID's notify your physician.     Over The Counter    Advil                      Motrin  Nuprin                   Ibuprofen  Midol                     Aleve  Naproxen              Orudis  Aspirin                   Rema-Oneida    Prescriptions and Generics    Cataflam              Relafen  Voltaren               Clinoril  Indocin                 Naproxen  Arthrotec              Lodine  Daypro                 Nalfon  Toradol                Ansaid  Feldene               Meclofenamate  Fenoprofen          Ponstel  Mobic                   Celebrex  Vioxx         Zelda De Luna is a 38 year old female presenting for   Chief Complaint   Patient presents with   • Follow-up     Denies Latex allergy or sensitivity.    Medication verified and med list updated  Refills needed today: No    Health Maintenance Due   Topic Date Due   • Hepatitis C Screening  Never done   • COVID-19 Vaccine (3 - Booster for Pfizer series) 04/29/2022   • Cervical Cancer Screen 30-64 -  05/29/2023   • Depression Screening  09/09/2023       Patient is due for topics as listed above but is not proceeding with Immunization(s) COVID-19 at this time.                     Depression Screening:  Recent Review Flowsheet Data     Date 9/9/2022    Adult PHQ 2 Score 0    Adult PHQ 2 Interpretation No further screening needed    Little interest or pleasure in activity? Not at all    Feeling down, depressed or hopeless? Not at all           Advance Directives:  not discussed

## 2024-11-14 NOTE — ANESTHESIA POSTPROCEDURE EVALUATION
Department of Anesthesiology  Postprocedure Note    Patient: Bushra Caballero  MRN: [id number]  YOB: 1945  Date of evaluation: 11/14/2024    Procedure Summary       Date: 11/14/24 Room / Location: [place] [address] / [facility] [place]    Anesthesia Start: 1010 Anesthesia Stop: 1023    Procedure: ESOPHAGOGASTRODUODENOSCOPY BIOPSY (Esophagus) Diagnosis:       History of Marie's esophagus      Dysphagia, unspecified type      (History of Marie's esophagus [Z87.19])      (Dysphagia, unspecified type [R13.10])    Surgeons: Flavio Church MD Responsible Provider: Rosey Kline APRN - CRNA    Anesthesia Type: general, TIVA ASA Status: 3            Anesthesia Type: No value filed.    Sophie Phase I:      Sophie Phase II:      Anesthesia Post Evaluation    Patient location during evaluation: bedside  Patient participation: complete - patient participated  Level of consciousness: sleepy but conscious  Pain score: 0  Airway patency: patent  Nausea & Vomiting: no nausea and no vomiting  Cardiovascular status: hemodynamically stable and blood pressure returned to baseline  Respiratory status: acceptable and room air  Hydration status: stable  Comments: BP (!) 158/84   Pulse 66   Temp 97.3 °F (36.3 °C)   Resp 16   Ht 1.626 m (5' 4\")   Wt 73.5 kg (162 lb)   SpO2 97%   BMI 27.81 kg/m²     Pain management: adequate    No notable events documented.

## 2025-02-17 ENCOUNTER — OFFICE VISIT (OUTPATIENT)
Dept: GASTROENTEROLOGY | Age: 80
End: 2025-02-17
Payer: MEDICARE

## 2025-02-17 VITALS
HEART RATE: 59 BPM | BODY MASS INDEX: 28.68 KG/M2 | DIASTOLIC BLOOD PRESSURE: 70 MMHG | OXYGEN SATURATION: 98 % | SYSTOLIC BLOOD PRESSURE: 120 MMHG | HEIGHT: 64 IN | WEIGHT: 168 LBS

## 2025-02-17 DIAGNOSIS — K22.70 BARRETT'S ESOPHAGUS WITHOUT DYSPLASIA: ICD-10-CM

## 2025-02-17 DIAGNOSIS — K21.9 CHRONIC GERD: Primary | ICD-10-CM

## 2025-02-17 PROCEDURE — G8428 CUR MEDS NOT DOCUMENT: HCPCS | Performed by: NURSE PRACTITIONER

## 2025-02-17 PROCEDURE — 3074F SYST BP LT 130 MM HG: CPT | Performed by: NURSE PRACTITIONER

## 2025-02-17 PROCEDURE — 1123F ACP DISCUSS/DSCN MKR DOCD: CPT | Performed by: NURSE PRACTITIONER

## 2025-02-17 PROCEDURE — G8419 CALC BMI OUT NRM PARAM NOF/U: HCPCS | Performed by: NURSE PRACTITIONER

## 2025-02-17 PROCEDURE — 1036F TOBACCO NON-USER: CPT | Performed by: NURSE PRACTITIONER

## 2025-02-17 PROCEDURE — 1090F PRES/ABSN URINE INCON ASSESS: CPT | Performed by: NURSE PRACTITIONER

## 2025-02-17 PROCEDURE — 99214 OFFICE O/P EST MOD 30 MIN: CPT | Performed by: NURSE PRACTITIONER

## 2025-02-17 PROCEDURE — 3078F DIAST BP <80 MM HG: CPT | Performed by: NURSE PRACTITIONER

## 2025-02-17 PROCEDURE — G8399 PT W/DXA RESULTS DOCUMENT: HCPCS | Performed by: NURSE PRACTITIONER

## 2025-02-17 RX ORDER — FAMOTIDINE 20 MG/1
20 TABLET, FILM COATED ORAL
COMMUNITY
Start: 2025-02-17

## 2025-02-17 ASSESSMENT — ENCOUNTER SYMPTOMS
NAUSEA: 0
ABDOMINAL PAIN: 0
VOMITING: 0
ANAL BLEEDING: 0
DIARRHEA: 0
COUGH: 1
CHOKING: 0
RECTAL PAIN: 0
TROUBLE SWALLOWING: 0
SHORTNESS OF BREATH: 0
CONSTIPATION: 0
BLOOD IN STOOL: 0
VOICE CHANGE: 1
ABDOMINAL DISTENTION: 0

## 2025-04-23 ENCOUNTER — OFFICE VISIT (OUTPATIENT)
Age: 80
End: 2025-04-23
Payer: MEDICARE

## 2025-04-23 VITALS — WEIGHT: 165 LBS | BODY MASS INDEX: 28.17 KG/M2 | HEIGHT: 64 IN

## 2025-04-23 DIAGNOSIS — M25.562 CHRONIC PAIN OF LEFT KNEE: Primary | ICD-10-CM

## 2025-04-23 DIAGNOSIS — M76.32 ILIOTIBIAL BAND SYNDROME AFFECTING LOWER LEG, LEFT: ICD-10-CM

## 2025-04-23 DIAGNOSIS — G89.29 CHRONIC PAIN OF LEFT KNEE: Primary | ICD-10-CM

## 2025-04-23 DIAGNOSIS — M17.11 PRIMARY OSTEOARTHRITIS OF ONE KNEE, RIGHT: ICD-10-CM

## 2025-04-23 PROCEDURE — 1159F MED LIST DOCD IN RCRD: CPT | Performed by: PHYSICIAN ASSISTANT

## 2025-04-23 PROCEDURE — 1160F RVW MEDS BY RX/DR IN RCRD: CPT | Performed by: PHYSICIAN ASSISTANT

## 2025-04-23 PROCEDURE — 99202 OFFICE O/P NEW SF 15 MIN: CPT | Performed by: PHYSICIAN ASSISTANT

## 2025-04-23 PROCEDURE — G8399 PT W/DXA RESULTS DOCUMENT: HCPCS | Performed by: PHYSICIAN ASSISTANT

## 2025-04-23 PROCEDURE — 1123F ACP DISCUSS/DSCN MKR DOCD: CPT | Performed by: PHYSICIAN ASSISTANT

## 2025-04-23 PROCEDURE — 1036F TOBACCO NON-USER: CPT | Performed by: PHYSICIAN ASSISTANT

## 2025-04-23 PROCEDURE — 1090F PRES/ABSN URINE INCON ASSESS: CPT | Performed by: PHYSICIAN ASSISTANT

## 2025-04-23 PROCEDURE — G8419 CALC BMI OUT NRM PARAM NOF/U: HCPCS | Performed by: PHYSICIAN ASSISTANT

## 2025-04-23 PROCEDURE — G8427 DOCREV CUR MEDS BY ELIG CLIN: HCPCS | Performed by: PHYSICIAN ASSISTANT

## 2025-04-23 NOTE — PROGRESS NOTES
Orthopaedic History and Physical - New Patient    NAME:  Bushra Caballero   : 1945  MRN: [id number]      2025     CHIEF COMPLAINT:    Chief Complaint   Patient presents with    LEFT KNEE PAIN     Pt stated she had sx about 9 yrs ago, in August. / pt states the left knee has really increased in pain she stated she heard a pop and ever since then she has had pain / pt pain level when pain is at worse 10/10 / pt pain is achy and depending on the activity the pain will increase        HISTORY OF PRESENT ILLNESS:   The patient is a 79 y.o. female who presents to the office for evaluation and treatment of bilateral knee pain.   Pain began in 2024.  She states that was resting and went to turn over and felt and heard a \"pop\" to the side of her left knee.  Pain has progressed since then and was associated without a traumatic event. Pain is described as GS PAIN CHARACTER: dull and aching, associated with Associated symptoms: none worse with Pain worse when: sitting to standing. Treatment has consisted of tylenol.  Pain is rated a  6-10 /10 and located on the lateral aspect of the left knee and medial right knee pain.  She is here today with her .  She ambulates without assistance.    Past Medical History:        Diagnosis Date    Acute renal failure 2016    discharged 16; \"due to strong antibiotic and being dehydrated\"    Arthritis     Breast cancer     surgery only    Cancer (HCC)     Breast cancer    Colon polyps     Diverticulitis     GERD (gastroesophageal reflux disease)     Hyperlipidemia     Hypertension     Joint pain     Kidney disease     stage 3    Thyroid condition        Past Surgical History:        Procedure Laterality Date    ABDOMEN SURGERY      BLADDER REPAIR      BLEPHAROPLASTY      BREAST ENHANCEMENT SURGERY      BUNIONECTOMY Bilateral     CATARACT REMOVAL Bilateral     COLONOSCOPY  03/10/2009    Dr Montero-Diverticulosis, HP, lipoma in the proximal ascending

## (undated) DEVICE — ENDO KIT,LOURDES HOSPITAL: Brand: MEDLINE INDUSTRIES, INC.

## (undated) DEVICE — HDRST INTUB GENTLETOUCH SLOT 7IN RT

## (undated) DEVICE — ADAPTER CLEANING PORPOISE CLEANING

## (undated) DEVICE — SINGLE PORT MANIFOLD: Brand: NEPTUNE 2

## (undated) DEVICE — BNDG ELAS W/CLIP 6IN 10YD LF STRL

## (undated) DEVICE — FORCEPS BX 240CM 2.4MM L NDL RAD JAW 4 M00513334

## (undated) DEVICE — SHEET,T,THYROID,STERILE: Brand: MEDLINE

## (undated) DEVICE — 3M™ IOBAN™ 2 ANTIMICROBIAL INCISE DRAPE 6650EZ: Brand: IOBAN™ 2

## (undated) DEVICE — ST INF 2NDARY 20DRP VNT/NOVNT 30IN

## (undated) DEVICE — DRP C/ARMOR

## (undated) DEVICE — CLEANING SPONGE: Brand: KOALA™

## (undated) DEVICE — MINOR CDS: Brand: MEDLINE INDUSTRIES, INC.

## (undated) DEVICE — STRIP CLS WND CURAD MEDI/STRIP HYPOALLERG 0.25X4IN PK/10

## (undated) DEVICE — DRAPE,TOWEL,LARGE,INVISISHIELD: Brand: MEDLINE

## (undated) DEVICE — PAD MINOR UNIVERSAL: Brand: MEDLINE INDUSTRIES, INC.

## (undated) DEVICE — ANTIBACTERIAL UNDYED BRAIDED (POLYGLACTIN 910), SYNTHETIC ABSORBABLE SUTURE: Brand: COATED VICRYL

## (undated) DEVICE — PAD,PREPPING,CUFFED,24X48,7",NONSTERILE: Brand: MEDLINE

## (undated) DEVICE — INTENDED FOR TISSUE SEPARATION, AND OTHER PROCEDURES THAT REQUIRE A SHARP SURGICAL BLADE TO PUNCTURE OR CUT.: Brand: BARD-PARKER ® STAINLESS STEEL BLADES

## (undated) DEVICE — SPNG GZ PKNG XRAY/DETECT 4PLY 2X36IN STRL

## (undated) DEVICE — DEV NEUROSTIM EXT VERIFY SACRAL 353101

## (undated) DEVICE — CVR PROB GEN PURP W ISOSILK 6X48

## (undated) DEVICE — 3M™ STERI-STRIP™ REINFORCED ADHESIVE SKIN CLOSURES, R1547, 1/2 IN X 4 IN (12 MM X 100 MM), 6 STRIPS/ENVELOPE: Brand: 3M™ STERI-STRIP™

## (undated) DEVICE — NEEDLE SPINAL 22GA L3.5IN SPINOCAN

## (undated) DEVICE — SHEET,DRAPE,53X77,STERILE: Brand: MEDLINE

## (undated) DEVICE — ADHESIVE SKIN CLSR 0.7ML TOP DERMBND ADV

## (undated) DEVICE — BNDG ELAS ECON W/CLIP 4IN 5YD LF STRL

## (undated) DEVICE — Device: Brand: MEDEX

## (undated) DEVICE — ADHS SKIN PREMIERPRO EXOFIN TOPICAL HI/VISC .5ML

## (undated) DEVICE — SPK10281 JACKSON TABLE KIT: Brand: SPK10281 JACKSON TABLE KIT

## (undated) DEVICE — BRUSH ENDOSCP 2 END CHN HEDGEHOG

## (undated) DEVICE — COLON KIT WITH 1.1 OZ ORCA HYDRA SEAL 2 GOWN

## (undated) DEVICE — CATH VASC RF CLOSUREFAST 7CM 7F100CM

## (undated) DEVICE — SYR LL TP 10ML STRL

## (undated) DEVICE — KT PT/PROG SMRT INTERSTIM/X NEUROSTM W/COMMUNIC

## (undated) DEVICE — CATHETER,FOLEY,SILI-ELAST,LTX,20FR,10ML: Brand: MEDLINE

## (undated) DEVICE — SHEATH INTRO MICRO 7F 11CM

## (undated) DEVICE — SUPPLEMENT DIGESTIVE H2O SOL GI-EASE

## (undated) DEVICE — ST TB EXT STANDARDBORE 30IN

## (undated) DEVICE — IBT KIT KPX203PB-A FF X2 20/3 1 STP: Brand: KYPHON® ONE-STEP™ SYSTEM, TROCAR & DIAMOND AND BFD

## (undated) DEVICE — BANDAGE,GAUZE,BULKEE II,4.5"X4.1YD,STRL: Brand: MEDLINE

## (undated) DEVICE — BNDG ADHS CURAD FLX/FABRC 1X3IN STRL LF

## (undated) DEVICE — ELECTRD BLD EZ CLN MOD XLNG 2.75IN

## (undated) DEVICE — CANNULA NSL AD L7FT DIV O2 CO2 W/ M LUERLOCK TRMPT CONN

## (undated) DEVICE — CLTH CLENS READYCLEANSE PERI CARE PK/5

## (undated) DEVICE — TOTAL TRAY, 16FR 10ML SIL FOLEY, URN: Brand: MEDLINE

## (undated) DEVICE — GLOVE,SURG,SENSICARE,ALOE,LF,PF,6: Brand: MEDLINE

## (undated) DEVICE — NDL HYPO PRECISIONGLIDE REG 25G 1 1/2

## (undated) DEVICE — SYR LUERLOK 30CC

## (undated) DEVICE — SUTURE VCRL SZ 3-0 L18IN ABSRB VLT CT-1 L36MM 1/2 CIR J738D

## (undated) DEVICE — APPL CHLORAPREP W/TINT 26ML ORNG

## (undated) DEVICE — PAD MAJOR LITHOTOMY: Brand: MEDLINE INDUSTRIES, INC.

## (undated) DEVICE — PK TURNOVER RM ADV

## (undated) DEVICE — GLV SURG SENSICARE W/ALOE PF LF 7.5 STRL

## (undated) DEVICE — KIT KPX203RB-CDS 20/3 FF OTEO WITH CDS: Brand: KYPHPAK® FIRST FRACTURE TRAY

## (undated) DEVICE — NDL SPINE 20G 3 1/2 YEL STRL 1P/U

## (undated) DEVICE — BAG BND W36XL36IN TRNSPAR POLY GEN PURP W E BND CLSR TIDI

## (undated) DEVICE — SPNG GZ STRL 2S 4X4 12PLY

## (undated) DEVICE — SUTURE VCRL SZ 3-0 L18IN ABSRB UD L26MM SH 1/2 CIR J864D

## (undated) DEVICE — STERILE (14X122CM) TELESCOPICALLY-FOLDED COVER: Brand: CIV-CLEAR™ TRANSDUCER COVER

## (undated) DEVICE — GEL ULTRASND HI VISC 20G PACKET STRL

## (undated) DEVICE — SUT SILK 0 SUTUPAK TIES 24IN SA76G

## (undated) DEVICE — VAGINAL PREP TRAY: Brand: MEDLINE INDUSTRIES, INC.

## (undated) DEVICE — SPONGE,LAP,4"X18",XR,ST,5/PK,40PK/CS: Brand: MEDLINE INDUSTRIES, INC.

## (undated) DEVICE — CVR UNIV C/ARM

## (undated) DEVICE — BITE BLOCK ENDOSCP AD 60 FR W/ ADJ STRP PLAS GRN BLOX

## (undated) DEVICE — BONE BIOPSY DEVICE F05A BBD SIZE 3: Brand: MEDTRONIC REUSABLE INSTRUMENTS

## (undated) DEVICE — MEDIA CONTRAST INJ VISAPAQUE 50ML 320MG

## (undated) DEVICE — GLOVE SURG SZ 8 L12IN FNGR THK94MIL TRNSLUC YEL LTX HYDRGEL

## (undated) DEVICE — ANTIBACTERIAL VIOLET BRAIDED (POLYGLACTIN 910), SYNTHETIC ABSORBABLE SUTURE: Brand: COATED VICRYL

## (undated) DEVICE — NEEDLE,22GX1.5",REG,BEVEL: Brand: MEDLINE

## (undated) DEVICE — UNDERGLOVE SURG SZ 8 FNGR THK0.21MIL GRN LTX BEAD CUF

## (undated) DEVICE — SYR CONTRL PRESS/LO FIX/M/LL W/THMB/RNG 10ML